# Patient Record
Sex: MALE | Race: WHITE | NOT HISPANIC OR LATINO | Employment: OTHER | URBAN - METROPOLITAN AREA
[De-identification: names, ages, dates, MRNs, and addresses within clinical notes are randomized per-mention and may not be internally consistent; named-entity substitution may affect disease eponyms.]

---

## 2020-12-04 ENCOUNTER — HOSPITAL ENCOUNTER (OUTPATIENT)
Dept: GENERAL RADIOLOGY | Facility: HOSPITAL | Age: 75
Discharge: HOME | End: 2020-12-04
Admitting: INTERNAL MEDICINE

## 2020-12-04 ENCOUNTER — TRANSCRIBE ORDERS (OUTPATIENT)
Dept: ADMINISTRATIVE | Facility: HOSPITAL | Age: 75
End: 2020-12-04

## 2020-12-04 DIAGNOSIS — M54.50 ACUTE BILATERAL LOW BACK PAIN WITHOUT SCIATICA: Primary | ICD-10-CM

## 2020-12-04 DIAGNOSIS — M54.50 ACUTE BILATERAL LOW BACK PAIN WITHOUT SCIATICA: ICD-10-CM

## 2020-12-04 PROCEDURE — 72114 X-RAY EXAM L-S SPINE BENDING: CPT

## 2021-04-16 ENCOUNTER — OFFICE VISIT (OUTPATIENT)
Dept: NEUROSURGERY | Facility: CLINIC | Age: 76
End: 2021-04-16

## 2021-04-16 VITALS — WEIGHT: 171.6 LBS | HEIGHT: 70 IN | TEMPERATURE: 96.8 F | BODY MASS INDEX: 24.57 KG/M2

## 2021-04-16 DIAGNOSIS — M51.36 DDD (DEGENERATIVE DISC DISEASE), LUMBAR: ICD-10-CM

## 2021-04-16 DIAGNOSIS — M47.819 FACET ARTHROPATHY: ICD-10-CM

## 2021-04-16 DIAGNOSIS — M48.062 SPINAL STENOSIS OF LUMBAR REGION WITH NEUROGENIC CLAUDICATION: Primary | ICD-10-CM

## 2021-04-16 DIAGNOSIS — M54.9 MECHANICAL BACK PAIN: ICD-10-CM

## 2021-04-16 PROCEDURE — 99203 OFFICE O/P NEW LOW 30 MIN: CPT | Performed by: NEUROLOGICAL SURGERY

## 2021-04-16 RX ORDER — LISINOPRIL 10 MG/1
40 TABLET ORAL DAILY
COMMUNITY
End: 2021-10-15

## 2021-04-16 RX ORDER — ATORVASTATIN CALCIUM 10 MG/1
40 TABLET, FILM COATED ORAL DAILY
COMMUNITY
End: 2021-10-15

## 2021-04-16 NOTE — PROGRESS NOTES
Patient: Chris Patel  : 1945    Primary Care Provider: Shannan Rivera MD    Requesting Provider: As above        History    Chief Complaint: Low back pain with walking and standing intolerance.    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  He denies any inciting or precipitating events.  The pain occurs with activity such as bending, twisting, working.  However symptoms become more pronounced if he is on his feet too long.  He can sit down or lie down and his symptoms improve and then he can get up and go once again.  He has no bowel or bladder dysfunction.  He has no leg symptoms including pain, weakness, sensory alteration.  He has done physical therapy and chiropractic to no avail.    Review of Systems   Constitutional: Negative.  Negative for activity change, appetite change, chills, diaphoresis, fatigue, fever and unexpected weight change.   HENT: Negative.  Negative for congestion, dental problem, drooling, ear discharge, ear pain, facial swelling, hearing loss, mouth sores, nosebleeds, postnasal drip, rhinorrhea, sinus pressure, sinus pain, sneezing, sore throat, tinnitus, trouble swallowing and voice change.    Eyes: Negative.  Negative for photophobia, pain, discharge, redness, itching and visual disturbance.   Respiratory: Negative.  Negative for apnea, cough, choking, chest tightness, shortness of breath, wheezing and stridor.    Cardiovascular: Negative.  Negative for chest pain, palpitations and leg swelling.   Gastrointestinal: Negative.  Negative for abdominal distention, abdominal pain, anal bleeding, blood in stool, constipation, diarrhea, nausea, rectal pain and vomiting.   Endocrine: Negative.  Negative for cold intolerance, heat intolerance, polydipsia, polyphagia and polyuria.   Genitourinary: Negative.  Negative for decreased urine volume, difficulty urinating,  "discharge, dysuria, enuresis, flank pain, frequency, genital sores, hematuria, penile pain, penile swelling, scrotal swelling, testicular pain and urgency.   Musculoskeletal: Positive for back pain. Negative for arthralgias, gait problem, joint swelling, myalgias, neck pain and neck stiffness.   Skin: Negative.  Negative for color change, pallor, rash and wound.   Allergic/Immunologic: Negative.  Negative for environmental allergies, food allergies and immunocompromised state.   Neurological: Negative.  Negative for dizziness, tremors, seizures, syncope, facial asymmetry, speech difficulty, weakness, light-headedness, numbness and headaches.   Hematological: Negative.  Negative for adenopathy. Does not bruise/bleed easily.   Psychiatric/Behavioral: Negative.  Negative for agitation, behavioral problems, confusion, decreased concentration, dysphoric mood, hallucinations, self-injury, sleep disturbance and suicidal ideas. The patient is not nervous/anxious and is not hyperactive.        The patient's past medical history, past surgical history, family history, and social history have been reviewed at length in the electronic medical record.    Physical Exam:   Temp 96.8 °F (36 °C)   Ht 177.8 cm (70\")   Wt 77.8 kg (171 lb 9.6 oz)   BMI 24.62 kg/m²   CONSTITUTIONAL: Patient is well-nourished, pleasant and appears stated age.  CV: Heart regular rate and rhythm without murmur, rub, or gallop.  PULMONARY: Lungs are clear to ascultation.  MUSCULOSKELETAL:  Straight leg raising is negative.  Thomas's Sign is negative.  ROM in the low back is somewhat limited in all directions.  Tenderness in the back to palpation is not observed.  NEUROLOGICAL:  Orientation, memory, attention span, language function, and cognition have been examined and are intact.  Strength is intact in the lower extremities to direct testing.  Muscle tone is normal throughout.  Station and gait are normal.  Sensation is intact to light touch testing " throughout.  Deep tendon reflexes are 1+ and symmetrical at the knees and absent at the ankles.  Coordination is intact.      Medical Decision Making    Data Review:   MRI of the lumbar spine dated 3/10/2021 demonstrates some diffuse degenerative disc disease.  There is some mild bilateral recess narrowing at multiple levels.  There is some epidural lipomatosis.    Diagnosis:   1.  Lumbar stenosis with neurogenic claudication.  2.  Mechanical low back pain.    Treatment Options:   While his MRI is not particularly remarkable his history is very consistent with neurogenic claudication.  I am going to set up a lumbar CT myelogram with upright images to see if that is an issue here.  If not then he may need to be referred for some injections.       Diagnosis Plan   1. Mechanical back pain     2. DDD (degenerative disc disease), lumbar     3. Facet arthropathy         Scribed for Kenan Arevalo MD by Lilly Diaz [facility] 4/16/2021 09:31 EDT      I, Dr. Arevalo, personally performed the services described in the documentation, as scribed in my presence, and it is both accurate and complete.

## 2021-05-18 ENCOUNTER — HOSPITAL ENCOUNTER (OUTPATIENT)
Dept: GENERAL RADIOLOGY | Facility: HOSPITAL | Age: 76
Discharge: HOME | End: 2021-05-18

## 2021-05-18 ENCOUNTER — HOSPITAL ENCOUNTER (OUTPATIENT)
Dept: CT IMAGING | Facility: HOSPITAL | Age: 76
Discharge: HOME | End: 2021-05-18

## 2021-05-18 VITALS
WEIGHT: 167 LBS | SYSTOLIC BLOOD PRESSURE: 142 MMHG | HEIGHT: 70 IN | DIASTOLIC BLOOD PRESSURE: 82 MMHG | OXYGEN SATURATION: 94 % | RESPIRATION RATE: 16 BRPM | BODY MASS INDEX: 23.91 KG/M2 | TEMPERATURE: 97.5 F | HEART RATE: 59 BPM

## 2021-05-18 DIAGNOSIS — M48.062 SPINAL STENOSIS OF LUMBAR REGION WITH NEUROGENIC CLAUDICATION: ICD-10-CM

## 2021-05-18 DIAGNOSIS — M48.062 SPINAL STENOSIS, LUMBAR REGION, WITH NEUROGENIC CLAUDICATION: ICD-10-CM

## 2021-05-18 PROCEDURE — 0 IOPAMIDOL 41 % SOLUTION: Performed by: NEUROLOGICAL SURGERY

## 2021-05-18 PROCEDURE — 72240 MYELOGRAPHY NECK SPINE: CPT

## 2021-05-18 PROCEDURE — 62304 MYELOGRAPHY LUMBAR INJECTION: CPT

## 2021-05-18 PROCEDURE — 62284 INJECTION FOR MYELOGRAM: CPT | Performed by: NEUROLOGICAL SURGERY

## 2021-05-18 PROCEDURE — 72132 CT LUMBAR SPINE W/DYE: CPT

## 2021-05-18 PROCEDURE — 72120 X-RAY BEND ONLY L-S SPINE: CPT

## 2021-05-18 RX ORDER — PROMETHAZINE HYDROCHLORIDE 25 MG/1
25 TABLET ORAL ONCE AS NEEDED
Status: DISCONTINUED | OUTPATIENT
Start: 2021-05-18 | End: 2021-05-20 | Stop reason: HOSPADM

## 2021-05-18 RX ORDER — ONDANSETRON 4 MG/1
4 TABLET, FILM COATED ORAL ONCE AS NEEDED
Status: DISCONTINUED | OUTPATIENT
Start: 2021-05-18 | End: 2021-05-20 | Stop reason: HOSPADM

## 2021-05-18 RX ORDER — LIDOCAINE HYDROCHLORIDE 10 MG/ML
5 INJECTION, SOLUTION EPIDURAL; INFILTRATION; INTRACAUDAL; PERINEURAL ONCE
Status: COMPLETED | OUTPATIENT
Start: 2021-05-18 | End: 2021-05-18

## 2021-05-18 RX ADMIN — IOPAMIDOL 20 ML: 408 INJECTION, SOLUTION INTRATHECAL at 07:14

## 2021-05-18 RX ADMIN — LIDOCAINE HYDROCHLORIDE 5 ML: 10 INJECTION, SOLUTION EPIDURAL; INFILTRATION; INTRACAUDAL; PERINEURAL at 07:12

## 2021-05-18 NOTE — POST-PROCEDURE NOTE
MYELOGRAM PROCEDURE NOTE  Neurosurgery    Patient: Chris Patel  : 1945      PreOp Diagnosis: Lumbar stenosis with neurogenic claudication    PostOp Diagnosis: Same    Procedure: Lumbar myelogram    Surgeon: Mickey    Anesthesia: 1% lidocaine    Technique:   Spinal needle: 22 gauge   Contrast: Isovue 200 (20ml)   Injection site: L L2    EBL: Trace    Specimens removed: None    Complication: None        Kenan Arevalo MD  21  07:22 EDT

## 2021-05-18 NOTE — NURSING NOTE
Pt discharged from ir dept s/p myelogram.  Pt tolerated procedure without complications.  Discharge instructions reviewed with patient and family that accompanied him today.  Pt kept indicating that he was planning on working in garage later today.  Stressed importance of laying down and drinking fluids as instructed to avoid spinal headache.  Pt was given a disc of today's test along with instructions to take with him to his appointment on Friday morning.  Pt reports he has a dermatologist appointment at 0800 that morning but should be able to make it to Dr. Arevalo's office in plenty of time. Pt transported to exit via wheelchair per CNA.

## 2021-05-19 ENCOUNTER — TELEPHONE (OUTPATIENT)
Dept: INFUSION THERAPY | Facility: HOSPITAL | Age: 76
End: 2021-05-19

## 2021-05-28 ENCOUNTER — OFFICE VISIT (OUTPATIENT)
Dept: NEUROSURGERY | Facility: CLINIC | Age: 76
End: 2021-05-28

## 2021-05-28 VITALS — TEMPERATURE: 96.9 F | WEIGHT: 166.6 LBS | HEIGHT: 70 IN | BODY MASS INDEX: 23.85 KG/M2

## 2021-05-28 DIAGNOSIS — M51.36 DDD (DEGENERATIVE DISC DISEASE), LUMBAR: ICD-10-CM

## 2021-05-28 DIAGNOSIS — M48.062 SPINAL STENOSIS OF LUMBAR REGION WITH NEUROGENIC CLAUDICATION: Primary | ICD-10-CM

## 2021-05-28 PROCEDURE — 99213 OFFICE O/P EST LOW 20 MIN: CPT | Performed by: NEUROLOGICAL SURGERY

## 2021-05-28 NOTE — PROGRESS NOTES
Patient: Chris Patel  : 1945    Primary Care Provider: Shannan Rivera MD    Requesting Provider: As above        History    Chief Complaint: Low back pain with walking and standing intolerance.    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  He denies any inciting or precipitating events.  The pain occurs with activity such as bending, twisting, working.  However symptoms become more pronounced if he is on his feet too long.  He can sit down or lie down and his symptoms improve and then he can get up and go once again.  He has no bowel or bladder dysfunction.  He has no leg symptoms including pain, weakness, sensory alteration.  He has done physical therapy and chiropractic to no avail.  His symptoms are unchanged.       Review of Systems   Constitutional: Negative for activity change, appetite change, chills, diaphoresis, fatigue, fever and unexpected weight change.   HENT: Negative for congestion, dental problem, drooling, ear discharge, ear pain, facial swelling, hearing loss, mouth sores, nosebleeds, postnasal drip, rhinorrhea, sinus pressure, sinus pain, sneezing, sore throat, tinnitus, trouble swallowing and voice change.    Eyes: Negative for photophobia, pain, discharge, redness, itching and visual disturbance.   Respiratory: Negative for apnea, cough, choking, chest tightness, shortness of breath, wheezing and stridor.    Cardiovascular: Negative for chest pain, palpitations and leg swelling.   Gastrointestinal: Negative for abdominal distention, abdominal pain, anal bleeding, blood in stool, constipation, diarrhea, nausea, rectal pain and vomiting.   Endocrine: Negative for cold intolerance, heat intolerance, polydipsia, polyphagia and polyuria.   Genitourinary: Negative for decreased urine volume, difficulty urinating, discharge, dysuria, enuresis, flank pain, frequency, genital  "sores, hematuria, penile pain, penile swelling, scrotal swelling, testicular pain and urgency.   Musculoskeletal: Positive for back pain. Negative for arthralgias, gait problem, joint swelling, myalgias, neck pain and neck stiffness.   Skin: Negative for color change, pallor, rash and wound.   Allergic/Immunologic: Negative for environmental allergies, food allergies and immunocompromised state.   Neurological: Negative for dizziness, tremors, seizures, syncope, facial asymmetry, speech difficulty, weakness, light-headedness, numbness and headaches.   Hematological: Negative for adenopathy. Does not bruise/bleed easily.   Psychiatric/Behavioral: Negative for agitation, behavioral problems, confusion, decreased concentration, dysphoric mood, hallucinations, self-injury, sleep disturbance and suicidal ideas. The patient is not nervous/anxious and is not hyperactive.        The patient's past medical history, past surgical history, family history, and social history have been reviewed at length in the electronic medical record.    Physical Exam:   Temp 96.9 °F (36.1 °C)   Ht 177.8 cm (70\")   Wt 75.6 kg (166 lb 9.6 oz)   BMI 23.90 kg/m²   MUSCULOSKELETAL:  Straight leg raising is negative.  Thomas's Sign is negative.  ROM in the low back is normal.  Tenderness in the back to palpation is not observed.  NEUROLOGICAL:  Strength is intact in the lower extremities to direct testing.  Muscle tone is normal throughout.  Station and gait are normal.  Sensation is intact to light touch testing throughout.    Medical Decision Making    Data Review:   (All imaging studies were personally reviewed unless stated otherwise)  CT myelogram demonstrates at least moderate stenosis at L3-4 and mild to moderate stenosis at L4-5.  No evidence of instability.    Diagnosis:   1.  Lumbar stenosis with neurogenic claudication.  2.  Mechanical low back pain.    Treatment Options:   The patient symptoms are very compelling for spinal " stenosis and neurogenic claudication.  The study findings are not as impressive.  I am going to refer him for some injections in his back.  He will follow-up thereafter.  If he is not improved then we will need to consider lumbar decompression at L3-4 and L4-5.       Diagnosis Plan   1. Spinal stenosis of lumbar region with neurogenic claudication  Ambulatory Referral to Pain Management   2. DDD (degenerative disc disease), lumbar         Scribed for Kenan Arevalo MD by Lilly Diaz, [facility] 5/28/2021 09:49 EDT      I, Dr. Arevalo, personally performed the services described in the documentation, as scribed in my presence, and it is both accurate and complete.

## 2021-07-01 NOTE — PROGRESS NOTES
"Chief Complaint: \"Lower back pain\"         History of Present Illness:   Patient: Mr. Chris Patel, 75 y.o. male   Referring Physician: Dr. Kenan Arevalo  Reason for Referral: Consultation for chronic intractable lower back pain.   Pain History:  Patient reports a 2-year history of progressive lower back pain, which began without incident.  Patient has failed to obtain pain relief with conservative measures including oral analgesics, physical therapy, chiropractic therapy, to name a few. CT myelogram revealed moderate stenosis at L3-L4 and mild to moderate stenosis at L4-L5.  Flexion and extension x-rays demonstrated no instability. Chris Patel underwent neurosurgical consultation with Dr. Kenan Arevalo on 05/28/2021, and was found to be a potential surgical candidate for lumbar decompression at L3-L4 and L4-L5. Pain has progressed in intensity over the past months.   Pain Description: constant pain with intermittent exacerbation, described as aching, burning and throbbing sensation.   Radiation of Pain: The pain does not radiate.  Pain intensity today: 5/10  Average pain intensity last week: 5/10  Pain intensity ranges from: 0/10 to 5/10  Aggravating factors: Pain increases with standing, walking. Patient describes neurogenic claudication.    Alleviating factors: Pain decreases with sitting down, lying down   Associated Symptoms:   Patient denies pain, numbness, or weakness in the lower extremities.   Patient denies any new bladder or bowel problems.   Patient denies difficulties with his balance or recent falls.     Review of previous therapies and additional medical records:  Chris Patel has already failed the following measures, including:   Conservative Measures: Oral analgesics, topical analgesics, ice, heat, chiropractic therapy, massage, physical therapy   Interventional Measures: None  Surgical Measures: No history of previous lumbar spine or hip surgery   Chris Patel underwent neurosurgical " consultation with Dr. eKnan Arevalo on 05/28/2021, and was found to be a potential surgical candidate for lumbar decompression at L3-L4 and L4-L5.  Chris Patel presents with significant comorbidities including psoriasis, hypertension, hyperlipidemia  In terms of current analgesics, Chris Patel takes: gabapentin  I have reviewed [facility] Report #[id number] (gabapentin) consistent with medication reconciliation.  SOAPP: Low Risk     Global Pain Scale 07-06  2021          Pain 13          Feelings 0          Clinical outcomes 2          Activities 0          GPS Total: 15            Review of Diagnostic Studies:    CT myelogram of the lumbar spine on 5/19/2021. I have reviewed the images with the patient as well as the radiology report.  Vertebral body height and alignment are maintained.  The conus medullaris and cauda equina appear unremarkable.  Diffuse lumbar spondylosis.  Axial imaging:  L1-L2: Disc bulge, facet hypertrophy.  No significant canal or foraminal stenosis  L2-L3: Disc bulge, facet hypertrophy.  Mild canal and mild bilateral foraminal stenosis  L3-L4: Disc bulge, facet hypertrophy, ligamentum flavum hypertrophy contributing to mild canal stenosis and mild bilateral foraminal stenosis  L4-L5: Disc bulge, facet hypertrophy, ligamentum flavum hypertrophy.  Mild canal and mild bilateral foraminal stenosis  L5-S1: Disc bulge, facet hypertrophy.  No significant canal or foraminal stenosis  Flexion and extension x-rays 5/19/2021.  I have reviewed images and radiology report.  There is good filling of the thecal sac.  There is effacement of the nerve root sleeves bilaterally at L3-L4.  MRI of the lumbar spine without contrast 3/10/2021.  I have reviewed the images and the radiology report.  Diffuse lumbar spondylosis.  Slight lumbar levoscoliosis.  Axial imaging:  T11-T12, T12-L1, L1-L2: No significant canal or foraminal stenosis  L2-L3: Annular fissure.  Schmorl's node.  Facet hypertrophy.  Mild canal  "stenosis.  No significant foraminal stenosis  L3-L4: Disc bulge, facet hypertrophy.  Mild canal and mild foraminal stenosis  L4-L5: Disc bulge, facet hypertrophy, mild Modic 1 endplate changes.  Mild canal and foraminal stenosis  L5-S1: Disc bulge, facet hypertrophy.  No significant canal stenosis.  Mild bilateral foraminal stenosis    Review of Systems   Musculoskeletal: Positive for back pain.   All other systems reviewed and are negative.        Patient Active Problem List   Diagnosis   • Lumbar stenosis with neurogenic claudication   • Spondylosis of lumbar region without myelopathy or radiculopathy   • Degeneration of lumbar or lumbosacral intervertebral disc   • Physical deconditioning   • Gait disturbance       Past Medical History:   Diagnosis Date   • Arthritis    • Back pain    • Chronic kidney disease     \"weak kidneys\"   • High cholesterol    • Hypertension    • Psoriasis          Past Surgical History:   Procedure Laterality Date   • HERNIA REPAIR           Family History   Family history unknown: Yes         Social History     Socioeconomic History   • Marital status:      Spouse name: Not on file   • Number of children: Not on file   • Years of education: Not on file   • Highest education level: Not on file   Tobacco Use   • Smoking status: Former Smoker     Packs/day: 1.00     Quit date:      Years since quittin.5   • Smokeless tobacco: Never Used   • Tobacco comment: 1 year   Vaping Use   • Vaping Use: Never assessed   Substance and Sexual Activity   • Alcohol use: Yes     Alcohol/week: 3.0 standard drinks     Types: 3 Shots of liquor per week     Comment: bourbon   • Drug use: Never   • Sexual activity: Defer           Current Outpatient Medications:   •  atorvastatin (LIPITOR) 40 MG tablet, Take 40 mg by mouth every night at bedtime., Disp: , Rfl:   •  gabapentin (NEURONTIN) 100 MG capsule, Take 100 mg by mouth 3 (Three) Times a Day., Disp: , Rfl:   •  lisinopril (PRINIVIL,ZESTRIL) " "40 MG tablet, Take 40 mg by mouth Daily., Disp: , Rfl:   •  Risankizumab-rzaa (SKYRIZI, 150 MG DOSE, SC), Inject 75 mg under the skin into the appropriate area as directed Every 30 (Thirty) Days., Disp: , Rfl:   •  atorvastatin (LIPITOR) 10 MG tablet, Take 40 mg by mouth Daily. Unknown , Disp: , Rfl:   •  lisinopril (PRINIVIL,ZESTRIL) 10 MG tablet, Take 40 mg by mouth Daily. Un known , Disp: , Rfl:       Allergies   Allergen Reactions   • Penicillins Hives         /78 (BP Location: Right arm, Patient Position: Sitting, Cuff Size: Adult)   Pulse 95   Temp 96.9 °F (36.1 °C)   Ht 177.8 cm (70\")   Wt 74 kg (163 lb 3.2 oz)   SpO2 95%   BMI 23.42 kg/m²       Physical Exam:  Constitutional: Patient appears well-developed, well-nourished, well-hydrated, appears younger than stated age  HEENT: Head: Normocephalic and atraumatic  Eyes: Conjunctivae and lids are normal  Pupils: Equal, round, reactive to light  Neck: Trachea normal. Neck supple. No JVD present.   Pulmonary: No increased work of breathing or signs of respiratory distress. Auscultation of lungs: Clear to auscultation.   Cardiovascular: Normal rate and rhythm, normal S1 and S2, no murmurs.   Peripheral vascular exam: Femoral: right 2+, left 2+. Posterior tibialis: right 2+ and left 2+. Dorsalis pedis: right 2+ and left 2+. No edema.   Musculoskeletal   Gait and station: Gait evaluation demonstrated some shuffling. Able to walk on heels, toes with some difficulties  Lumbar spine: Passive and active range of motion are limited although without pain. Extension, flexion, lateral flexion, rotation of the lumbar spine did not increase or reproduce pain. Lumbar facet joint loading maneuvers are negative  Thomas test and Gaenslen's test are negative   Piriformis maneuvers are negative   Palpation of the bilateral greater trochanter, unrevealing   Examination of the Iliotibial band: unrevealing   Hip joints: The range of motion of the hip joints is almost full " and without pain   Neurological:   Patient is alert and oriented to person, place, and time.   Speech: Normal.   Cortical function: Normal mental status.   Cranial nerves 2-12: intact.   Reflex Scores:  Right patellar: 2+  Left patellar: 2+  Right Achilles: 1+  Left Achilles: 1+  Motor strength: 5/5  Motor Tone: Normal .   Involuntary movements: None.   Superficial/Primitive Reflexes: Primitive reflexes were absent.   Right La: Absent  Left La: Absent  Right ankle clonus: Absent  Left ankle clonus: Absent   Babinsky: Absent  Long tract signs: Negative. Straight leg raising test: Negative. Femoral stretch sign: Negative.   Sensory exam: Intact to light touch, intact pain and temperature sensation, intact vibration sensation and normal proprioception.   Coordination: Normal finger to nose and heel to shin. Normal balance and negative Romberg's sign   Skin and subcutaneous tissue: Skin is warm and intact. No rash noted. No cyanosis.   Psychiatric: Judgment and insight: Normal. Recent and remote memory: Intact. Mood and affect: Normal.     ASSESSMENT:   1. Lumbar stenosis with neurogenic claudication    2. Spondylosis of lumbar region without myelopathy or radiculopathy    3. Degeneration of lumbar or lumbosacral intervertebral disc    4. Gait disturbance    5. Physical deconditioning          PLAN/MEDICAL DECISION MAKING:  Patient reports a 2-year history of progressive lower back pain associated with severe neurogenic claudication, which began without incident.  Patient has failed to obtain pain relief with conservative measures including oral analgesics, physical therapy, chiropractic therapy, to name a few. CT myelogram revealed moderate stenosis at L3-L4 and mild to moderate stenosis at L4-L5.  Flexion and extension x-rays demonstrated no instability. Chris Patel underwent neurosurgical consultation with Dr. Kenan Arevalo on 05/28/2021, and was found to be a potential surgical candidate for lumbar  decompression at L3-L4 and L4-L5. Pain has progressed in intensity over the past months. Patient has failed to obtain pain relief with conservative measures, as referenced above. I have reviewed all available patient's medical records as well as previous therapies as referenced above. I had a lengthy conversation with Mr. Chris Patel regarding his chronic pain condition and potential therapeutic options including risks, benefits, alternative therapies, to name a few. Therefore, I have proposed the following plan:  1. Pharmacological measures: Reviewed and discussed;   A. Patient takes gabapentin  B. Trial with Rheumate one tablet twice daily  C. Start pyridoxine 100 mg one tablet by mouth daily, take for 30 days  2. Interventional pain management measures: Patient will be scheduled for diagnostic and therapeutic bilateral L3-L4 transforaminal epidural steroid injections. We may repeat epidurals depending on patient's outcome.  Patient will follow-up with Dr. Arevalo thereafter for possible lumbar decompression at L3-L4 and L4-L5.  If surgery is not an option would include the possibility of a mild procedure or eventually a spinal cord stimulator trial.  3. Long-term rehabilitation efforts:  A. The patient does not have a history of falls. I did complete a risk assessment for falls  B. Patient will start a comprehensive physical therapy program at [facility] with Dr. Flip Carrion for core strengthening, gait and balance training, neurodynamics, cupping, dry needling and Alter-G   C. Start an exercise program such as water therapy  D. Contrast therapy: Apply ice-packs for 15-20 minutes, followed by heating pads for 15-20 minutes to affected area   4. The patient has been instructed to contact my office with any questions or difficulties. The patient understands the plan and agrees to proceed accordingly.      Patient Care Team:  Shannan Rivera MD as PCP - General (Internal Medicine & Pediatrics)  Shannan Rivera MD as  Referring Physician (Internal Medicine & Pediatrics)     No orders of the defined types were placed in this encounter.        No future appointments.      Akin Rodriguez MD     EMR Dragon/Transcription disclaimer:  Much of this encounter note is an electronic transcription of spoken language to printed text. Electronic transcription of spoken language may permit erroneous, or at times, nonsensical words or phrases to be inadvertently transcribed. Although I have reviewed the note for such errors, some may still exist.

## 2021-07-02 PROBLEM — M51.37 DEGENERATION OF LUMBAR OR LUMBOSACRAL INTERVERTEBRAL DISC: Status: ACTIVE | Noted: 2021-07-02

## 2021-07-02 PROBLEM — M47.816 SPONDYLOSIS OF LUMBAR REGION WITHOUT MYELOPATHY OR RADICULOPATHY: Status: ACTIVE | Noted: 2021-07-02

## 2021-07-02 PROBLEM — M48.062 LUMBAR STENOSIS WITH NEUROGENIC CLAUDICATION: Status: ACTIVE | Noted: 2021-07-02

## 2021-07-06 ENCOUNTER — OFFICE VISIT (OUTPATIENT)
Dept: PAIN MEDICINE | Facility: CLINIC | Age: 76
End: 2021-07-06

## 2021-07-06 VITALS
DIASTOLIC BLOOD PRESSURE: 78 MMHG | HEART RATE: 95 BPM | BODY MASS INDEX: 23.37 KG/M2 | WEIGHT: 163.2 LBS | TEMPERATURE: 96.9 F | OXYGEN SATURATION: 95 % | HEIGHT: 70 IN | SYSTOLIC BLOOD PRESSURE: 130 MMHG

## 2021-07-06 DIAGNOSIS — M47.816 SPONDYLOSIS OF LUMBAR REGION WITHOUT MYELOPATHY OR RADICULOPATHY: ICD-10-CM

## 2021-07-06 DIAGNOSIS — R53.81 PHYSICAL DECONDITIONING: ICD-10-CM

## 2021-07-06 DIAGNOSIS — M51.37 DEGENERATION OF LUMBAR OR LUMBOSACRAL INTERVERTEBRAL DISC: ICD-10-CM

## 2021-07-06 DIAGNOSIS — R26.9 GAIT DISTURBANCE: ICD-10-CM

## 2021-07-06 DIAGNOSIS — M48.062 LUMBAR STENOSIS WITH NEUROGENIC CLAUDICATION: Primary | ICD-10-CM

## 2021-07-06 DIAGNOSIS — M48.062 LUMBAR STENOSIS WITH NEUROGENIC CLAUDICATION: ICD-10-CM

## 2021-07-06 PROCEDURE — 99203 OFFICE O/P NEW LOW 30 MIN: CPT | Performed by: ANESTHESIOLOGY

## 2021-07-06 RX ORDER — ATORVASTATIN CALCIUM 40 MG/1
40 TABLET, FILM COATED ORAL
COMMUNITY
Start: 2021-06-07

## 2021-07-06 RX ORDER — GABAPENTIN 100 MG/1
100 CAPSULE ORAL 3 TIMES DAILY
COMMUNITY
Start: 2021-06-07 | End: 2021-10-15

## 2021-07-06 RX ORDER — LISINOPRIL 40 MG/1
40 TABLET ORAL DAILY
COMMUNITY
Start: 2021-06-07

## 2021-07-06 RX ORDER — MULTIVITAMIN WITH IRON
100 TABLET ORAL DAILY
Qty: 30 TABLET | Refills: 0 | Status: SHIPPED | OUTPATIENT
Start: 2021-07-06 | End: 2021-09-08

## 2021-07-06 RX ORDER — ME-TETRAHYDROFOLATE/B12/HRB236 1-1-500 MG
1 CAPSULE ORAL DAILY
Qty: 90 CAPSULE | Refills: 1 | Status: SHIPPED | OUTPATIENT
Start: 2021-07-06 | End: 2021-09-08

## 2021-07-19 ENCOUNTER — OUTSIDE FACILITY SERVICE (OUTPATIENT)
Dept: PAIN MEDICINE | Facility: CLINIC | Age: 76
End: 2021-07-19

## 2021-07-19 PROCEDURE — 64483 NJX AA&/STRD TFRM EPI L/S 1: CPT | Performed by: ANESTHESIOLOGY

## 2021-07-19 PROCEDURE — 99152 MOD SED SAME PHYS/QHP 5/>YRS: CPT | Performed by: ANESTHESIOLOGY

## 2021-07-20 ENCOUNTER — TELEPHONE (OUTPATIENT)
Dept: PAIN MEDICINE | Facility: CLINIC | Age: 76
End: 2021-07-20

## 2021-07-20 NOTE — TELEPHONE ENCOUNTER
LVM for pt regarding how they’re feeling after yesterday’s procedure with Dr. Rodriguez. Advised of f/u and pt to contact us if needed.

## 2021-08-24 NOTE — PROGRESS NOTES
"Chief Complaint: \"Lower back pain.\"         History of Present Illness:   Patient: Mr. Chris Patel, 75 y.o. male originally referred by Dr. Kenan Arevalo in consultation for chronic intractable lower back pain. Patient reports a 2-year history of lower back pain, which began without incident.  He was last seen on July 19, 2021, when he underwent diagnostic and therapeutic bilateral L3-L4 transforaminal epidural steroid injection, from which he reports experiencing no significant pain relief or reduction in his pain levels.  He did not begin physical therapy.  He returns today for post procedure follow-up and evaluation.  Pain Description: intermittent exacerbation (constant with ambulation), described as aching, burning and throbbing sensation.   Radiation of Pain: The pain does not radiate.  Pain intensity today: 5/10  Average pain intensity last week: 5/10  Pain intensity ranges from: 0/10 to 7/10  Aggravating factors: Pain increases with standing, walking. Patient describes neurogenic claudication.    Alleviating factors: Pain decreases with sitting down, lying down   Associated Symptoms:   Patient denies pain, numbness, or weakness in the lower extremities.   Patient denies any new bladder or bowel problems.   Patient denies difficulties with his balance or recent falls.     Review of previous therapies and additional medical records:  Chris Patel has already failed the following measures, including:   Conservative Measures: Oral analgesics, topical analgesics, ice, heat, chiropractic therapy, massage, physical therapy   Interventional Measures: 07/19/2021: DxTx bilateral L3-L4 transforaminal epidural steroid injection  Surgical Measures: No history of previous lumbar spine or hip surgery   Chris Patel underwent neurosurgical consultation with Dr. Kenan Arevalo on 05/28/2021, and was found to be a potential surgical candidate for lumbar decompression at L3-L4 and L4-L5.  Chris Ptael presents with " significant comorbidities including psoriasis, hypertension, hyperlipidemia  In terms of current analgesics, Chris Patel takes: OTC  I have reviewed Gabriel Report #[id number] consistent with medication reconciliation.  SOAPP: Low Risk     Global Pain Scale 07-06  2021 08-25 2021         Pain 13 14         Feelings 0 0         Clinical outcomes 2 3         Activities 0 5         GPS Total: 15 22           Review of Diagnostic Studies:    CT myelogram of the lumbar spine on 5/19/2021.  Images were reviewed.  Vertebral body height and alignment are maintained.  The conus medullaris and cauda equina appear unremarkable.  Diffuse lumbar spondylosis.    L1-L2: Disc bulge, facet hypertrophy.  No significant canal or foraminal stenosis  L2-L3: Disc bulge, facet hypertrophy.  Mild canal and mild bilateral foraminal stenosis  L3-L4: Disc bulge, facet hypertrophy, ligamentum flavum hypertrophy contributing to mild canal stenosis and mild bilateral foraminal stenosis  L4-L5: Disc bulge, facet hypertrophy, ligamentum flavum hypertrophy.  Mild canal and mild bilateral foraminal stenosis  L5-S1: Disc bulge, facet hypertrophy.  No significant canal or foraminal stenosis  Flexion and extension x-rays 5/19/2021.  There is good filling of the thecal sac.  There is effacement of the nerve root sleeves bilaterally at L3-L4.  MRI of the lumbar spine without contrast 3/10/2021.  Images were reviewed.  Diffuse lumbar spondylosis.  Slight lumbar levoscoliosis.   T11-T12, T12-L1, L1-L2: No significant canal or foraminal stenosis  L2-L3: Annular fissure.  Schmorl's node.  Facet hypertrophy.  Mild canal stenosis.  No significant foraminal stenosis  L3-L4: Disc bulge, facet hypertrophy.  Mild canal and mild foraminal stenosis  L4-L5: Disc bulge, facet hypertrophy, mild Modic 1 endplate changes.  Mild canal and foraminal stenosis  L5-S1: Disc bulge, facet hypertrophy.  No significant canal stenosis.  Mild bilateral foraminal stenosis    Review  "of Systems   Musculoskeletal: Positive for back pain.   All other systems reviewed and are negative.        Patient Active Problem List   Diagnosis   • Lumbar stenosis with neurogenic claudication   • Spondylosis of lumbar region without myelopathy or radiculopathy   • Degeneration of lumbar or lumbosacral intervertebral disc   • Physical deconditioning   • Gait disturbance       Past Medical History:   Diagnosis Date   • Arthritis    • Back pain    • Chronic kidney disease     \"weak kidneys\"   • High cholesterol    • Hypertension    • Psoriasis          Past Surgical History:   Procedure Laterality Date   • HERNIA REPAIR           Family History   Family history unknown: Yes         Social History     Socioeconomic History   • Marital status:      Spouse name: Not on file   • Number of children: Not on file   • Years of education: Not on file   • Highest education level: Not on file   Tobacco Use   • Smoking status: Former Smoker     Packs/day: 1.00     Quit date:      Years since quittin.6   • Smokeless tobacco: Never Used   • Tobacco comment: 1 year   Vaping Use   • Vaping Use: Never assessed   Substance and Sexual Activity   • Alcohol use: Yes     Alcohol/week: 3.0 standard drinks     Types: 3 Shots of liquor per week     Comment: bourbon   • Drug use: Never   • Sexual activity: Defer           Current Outpatient Medications:   •  atorvastatin (LIPITOR) 40 MG tablet, Take 40 mg by mouth every night at bedtime., Disp: , Rfl:   •  lisinopril (PRINIVIL,ZESTRIL) 40 MG tablet, Take 40 mg by mouth Daily., Disp: , Rfl:   •  Risankizumab-rzaa (SKYRIZI, 150 MG DOSE, SC), Inject 75 mg under the skin into the appropriate area as directed Every 30 (Thirty) Days., Disp: , Rfl:   •  atorvastatin (LIPITOR) 10 MG tablet, Take 40 mg by mouth Daily. Unknown , Disp: , Rfl:   •  Dietary Management Product (Rheumate) capsule, Take 1 capsule by mouth Daily., Disp: 90 capsule, Rfl: 1  •  gabapentin (NEURONTIN) 100 MG " "capsule, Take 100 mg by mouth 3 (Three) Times a Day., Disp: , Rfl:   •  lisinopril (PRINIVIL,ZESTRIL) 10 MG tablet, Take 40 mg by mouth Daily. Un known , Disp: , Rfl:   •  vitamin B-6 (PYRIDOXINE) 100 MG tablet, Take 1 tablet by mouth Daily., Disp: 30 tablet, Rfl: 0      Allergies   Allergen Reactions   • Penicillins Hives         /89 (BP Location: Left arm, Patient Position: Sitting, Cuff Size: Adult)   Pulse 74   Temp 96.6 °F (35.9 °C)   Ht 177.8 cm (70\")   Wt 73.5 kg (162 lb)   SpO2 96%   BMI 23.24 kg/m²       Physical Exam:  Constitutional: Patient appears well-developed, well-nourished, well-hydrated, appears younger than stated age  HEENT: Head: Normocephalic and atraumatic  Eyes: Conjunctivae and lids are normal  Pupils: Equal, round, reactive to light  Neck: Trachea normal. Neck supple. No JVD present.   Pulmonary: No increased work of breathing or signs of respiratory distress. Auscultation of lungs: Clear to auscultation.   Cardiovascular: Normal rate and rhythm, normal S1 and S2, no murmurs.   Peripheral vascular exam: Femoral: right 2+, left 2+. Posterior tibialis: right 2+ and left 2+. Dorsalis pedis: right 2+ and left 2+. No edema.   Musculoskeletal   Gait and station: Gait evaluation demonstrated some shuffling.   Lumbar spine: Passive and active range of motion are limited without pain. Extension, flexion, lateral flexion, rotation of the lumbar spine did not increase or reproduce pain. Lumbar facet joint loading maneuvers are negative  Thomas test and Gaenslen's test are negative   Piriformis maneuvers are negative   Palpation of the bilateral greater trochanter, unrevealing   Examination of the Iliotibial band: unrevealing   Hip joints: The range of motion of the hip joints is almost full and without pain   Neurological:   Patient is alert and oriented to person, place, and time.   Speech: Normal.   Cortical function: Normal mental status.   Cranial nerves 2-12: intact.   Reflex " Scores:  Right patellar: 2+  Left patellar: 2+  Right Achilles: 1+  Left Achilles: 1+  Motor strength: 5/5  Motor Tone: Normal .   Involuntary movements: None.   Superficial/Primitive Reflexes: Primitive reflexes were absent.   Right La: Absent  Left La: Absent  Right ankle clonus: Absent  Left ankle clonus: Absent   Babinsky: Absent  Long tract signs: Negative. Straight leg raising test: Negative. Femoral stretch sign: Negative.   Sensory exam: Intact to light touch, intact pain and temperature sensation, intact vibration sensation and normal proprioception.   Coordination: Normal finger to nose and heel to shin. Normal balance and negative Romberg's sign   Skin and subcutaneous tissue: Skin is warm and intact. No rash noted. No cyanosis.   Psychiatric: Judgment and insight: Normal. Recent and remote memory: Intact. Mood and affect: Normal.     I have reviewed the physical exam dated 07/06/2021. Upon examination of the patient today, there are no changes noted.      ASSESSMENT:   1. Lumbar stenosis with neurogenic claudication    2. Degeneration of lumbar or lumbosacral intervertebral disc    3. Spondylosis of lumbar region without myelopathy or radiculopathy    4. Gait disturbance    5. Physical deconditioning          PLAN/MEDICAL DECISION MAKING:  Patient reports a 2-year history of  lower back pain associated with severe neurogenic claudication.  Patient has failed to obtain pain relief with conservative measures including oral analgesics, physical therapy, chiropractic therapy, to name a few. CT myelogram revealed moderate stenosis at L3-L4 and mild to moderate stenosis at L4-L5.  Flexion and extension x-rays demonstrated no instability. Chris Patel underwent neurosurgical consultation with Dr. Kenan Arevalo on 05/28/2021, and was found to be a potential surgical candidate for lumbar decompression at L3-L4 and L4-L5.  Patient has failed to obtain pain relief with conservative measures, as  referenced above. I have reviewed all available patient's medical records as well as previous therapies as referenced above. We will proceed with repeat epidural to determine if this will provide him with more of benefit, and we have also discussed possibly revisitin a consult with Dr. Arevalo. I had a lengthy conversation with Mr. Chris Patel regarding his chronic pain condition and potential therapeutic options including risks, benefits, alternative therapies, to name a few. Therefore, I have proposed the following plan:  1. Pharmacological measures: Reviewed and discussed;   A. Patient takes OTC  2. Interventional pain management measures: Patient will be scheduled for bilateral L3-L4 transforaminal epidural steroid injections. We may repeat epidurals depending on patient's outcome.  Patient will follow-up with Dr. Arevalo thereafter for possible lumbar decompression at L3-L4 and L4-L5.  If surgery is not an option would include the possibility of a mild procedure or eventually a spinal cord stimulator trial.  3. Long-term rehabilitation efforts:  A. The patient does not have a history of falls. I did complete a risk assessment for falls  B. Patient will start a comprehensive physical therapy program for core strengthening, gait and balance training, neurodynamics, cupping, dry needling and Alter-G   C. Start an exercise program such as water therapy  D. Contrast therapy: Apply ice-packs for 15-20 minutes, followed by heating pads for 15-20 minutes to affected area   4. The patient has been instructed to contact my office with any questions or difficulties. The patient understands the plan and agrees to proceed accordingly.      Patient Care Team:  Shannan Rivera MD as PCP - General (Internal Medicine & Pediatrics)  Shannan Rivera MD as Referring Physician (Internal Medicine & Pediatrics)     No orders of the defined types were placed in this encounter.        No future appointments.      MARSHALL Doonvan      EMR Dragon/Transcription disclaimer:  Much of this encounter note is an electronic transcription of spoken language to printed text. Electronic transcription of spoken language may permit erroneous, or at times, nonsensical words or phrases to be inadvertently transcribed. Although I have reviewed the note for such errors, some may still exist.

## 2021-08-25 ENCOUNTER — OFFICE VISIT (OUTPATIENT)
Dept: PAIN MEDICINE | Facility: CLINIC | Age: 76
End: 2021-08-25

## 2021-08-25 VITALS
DIASTOLIC BLOOD PRESSURE: 89 MMHG | WEIGHT: 162 LBS | HEIGHT: 70 IN | BODY MASS INDEX: 23.19 KG/M2 | TEMPERATURE: 96.6 F | SYSTOLIC BLOOD PRESSURE: 153 MMHG | HEART RATE: 74 BPM | OXYGEN SATURATION: 96 %

## 2021-08-25 DIAGNOSIS — M47.816 SPONDYLOSIS OF LUMBAR REGION WITHOUT MYELOPATHY OR RADICULOPATHY: ICD-10-CM

## 2021-08-25 DIAGNOSIS — R53.81 PHYSICAL DECONDITIONING: ICD-10-CM

## 2021-08-25 DIAGNOSIS — M48.062 LUMBAR STENOSIS WITH NEUROGENIC CLAUDICATION: ICD-10-CM

## 2021-08-25 DIAGNOSIS — M48.062 LUMBAR STENOSIS WITH NEUROGENIC CLAUDICATION: Primary | ICD-10-CM

## 2021-08-25 DIAGNOSIS — R26.9 GAIT DISTURBANCE: ICD-10-CM

## 2021-08-25 DIAGNOSIS — M51.37 DEGENERATION OF LUMBAR OR LUMBOSACRAL INTERVERTEBRAL DISC: ICD-10-CM

## 2021-08-25 PROCEDURE — 99213 OFFICE O/P EST LOW 20 MIN: CPT | Performed by: NURSE PRACTITIONER

## 2021-08-30 ENCOUNTER — OUTSIDE FACILITY SERVICE (OUTPATIENT)
Dept: PAIN MEDICINE | Facility: CLINIC | Age: 76
End: 2021-08-30

## 2021-08-30 PROCEDURE — 64483 NJX AA&/STRD TFRM EPI L/S 1: CPT | Performed by: ANESTHESIOLOGY

## 2021-08-30 PROCEDURE — 99152 MOD SED SAME PHYS/QHP 5/>YRS: CPT | Performed by: ANESTHESIOLOGY

## 2021-08-31 ENCOUNTER — TELEPHONE (OUTPATIENT)
Dept: PAIN MEDICINE | Facility: CLINIC | Age: 76
End: 2021-08-31

## 2021-08-31 NOTE — TELEPHONE ENCOUNTER
KAREEMM for pt advised that there is follow up in our office but that pt needs to call Dr. Hernandez's office to follow up.

## 2021-09-08 ENCOUNTER — OFFICE VISIT (OUTPATIENT)
Dept: NEUROSURGERY | Facility: CLINIC | Age: 76
End: 2021-09-08

## 2021-09-08 ENCOUNTER — PREP FOR SURGERY (OUTPATIENT)
Dept: OTHER | Facility: HOSPITAL | Age: 76
End: 2021-09-08

## 2021-09-08 VITALS — WEIGHT: 161.4 LBS | BODY MASS INDEX: 23.11 KG/M2 | TEMPERATURE: 97.5 F | HEIGHT: 70 IN

## 2021-09-08 DIAGNOSIS — M48.062 LUMBAR STENOSIS WITH NEUROGENIC CLAUDICATION: Primary | ICD-10-CM

## 2021-09-08 DIAGNOSIS — M48.062 SPINAL STENOSIS OF LUMBAR REGION WITH NEUROGENIC CLAUDICATION: Primary | ICD-10-CM

## 2021-09-08 DIAGNOSIS — M51.36 DDD (DEGENERATIVE DISC DISEASE), LUMBAR: ICD-10-CM

## 2021-09-08 PROCEDURE — 99213 OFFICE O/P EST LOW 20 MIN: CPT | Performed by: NEUROLOGICAL SURGERY

## 2021-09-08 RX ORDER — VANCOMYCIN HYDROCHLORIDE 1 G/200ML
15 INJECTION, SOLUTION INTRAVENOUS ONCE
Status: CANCELLED | OUTPATIENT
Start: 2021-09-08 | End: 2021-09-08

## 2021-09-08 RX ORDER — FAMOTIDINE 20 MG/1
20 TABLET, FILM COATED ORAL
Status: CANCELLED | OUTPATIENT
Start: 2021-09-08

## 2021-09-08 NOTE — H&P
Patient: Chris Patel  : 1945     Primary Care Provider: Shannan Rivera MD     Requesting Provider: As above           History     Chief Complaint: Low back pain with walking and standing intolerance.     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  He is now doing lawn maintenance such as weed eating.  He denies any inciting or precipitating events.  The pain occurs with activity such as bending, twisting, working.  However symptoms become more pronounced if he is on his feet too long.  He can sit down or lie down and his symptoms improve and then he can get up and go once again.  He has no bowel or bladder dysfunction.  He has no leg symptoms including pain, weakness, sensory alteration.  He has done physical therapy and chiropractic to no avail.  His symptoms are unchanged.  He has had injections but that has not helped very much.  When he is on his feet too long his back bothers him substantially.  He is able to sit down for 10 minutes and then get up and go again.     Review of Systems   Constitutional: Negative for activity change, appetite change, chills, diaphoresis, fatigue, fever and unexpected weight change.   HENT: Negative for congestion, dental problem, drooling, ear discharge, ear pain, facial swelling, hearing loss, mouth sores, nosebleeds, postnasal drip, rhinorrhea, sinus pressure, sinus pain, sneezing, sore throat, tinnitus, trouble swallowing and voice change.    Eyes: Negative for photophobia, pain, discharge, redness, itching and visual disturbance.   Respiratory: Negative for apnea, cough, choking, chest tightness, shortness of breath, wheezing and stridor.    Cardiovascular: Negative for chest pain, palpitations and leg swelling.   Gastrointestinal: Negative for abdominal distention, abdominal pain, anal bleeding, blood in stool, constipation, diarrhea, nausea, rectal  "pain and vomiting.   Endocrine: Negative for cold intolerance, heat intolerance, polydipsia, polyphagia and polyuria.   Genitourinary: Negative for decreased urine volume, difficulty urinating, dysuria, enuresis, flank pain, frequency, genital sores, hematuria and urgency.   Musculoskeletal: Negative for arthralgias, back pain, gait problem, joint swelling, myalgias, neck pain and neck stiffness.   Skin: Negative for color change, pallor, rash and wound.   Allergic/Immunologic: Negative for environmental allergies, food allergies and immunocompromised state.   Neurological: Negative for dizziness, tremors, seizures, syncope, facial asymmetry, speech difficulty, weakness, light-headedness, numbness and headaches.   Hematological: Negative for adenopathy. Does not bruise/bleed easily.   Psychiatric/Behavioral: Negative for agitation, behavioral problems, confusion, decreased concentration, dysphoric mood, hallucinations, self-injury, sleep disturbance and suicidal ideas. The patient is not nervous/anxious and is not hyperactive.    All other systems reviewed and are negative.        The patient's past medical history, past surgical history, family history, and social history have been reviewed at length in the electronic medical record.     Past Medical History:   Diagnosis Date   • Arthritis    • Back pain    • Chronic kidney disease     \"weak kidneys\"   • High cholesterol    • Hypertension    • Psoriasis      Past Surgical History:   Procedure Laterality Date   • HERNIA REPAIR       Family History   Family history unknown: Yes     Social History     Socioeconomic History   • Marital status:      Spouse name: Not on file   • Number of children: Not on file   • Years of education: Not on file   • Highest education level: Not on file   Tobacco Use   • Smoking status: Former Smoker     Packs/day: 1.00     Quit date:      Years since quittin.6   • Smokeless tobacco: Never Used   • Tobacco comment: 1 year " "  Vaping Use   • Vaping Use: Never used   Substance and Sexual Activity   • Alcohol use: Yes     Alcohol/week: 3.0 standard drinks     Types: 3 Shots of liquor per week     Comment: bourbon   • Drug use: Never   • Sexual activity: Defer       Allergies   Allergen Reactions   • Penicillins Hives       Current Outpatient Medications on File Prior to Visit   Medication Sig Dispense Refill   • atorvastatin (LIPITOR) 10 MG tablet Take 40 mg by mouth Daily. Unknown      • atorvastatin (LIPITOR) 40 MG tablet Take 40 mg by mouth every night at bedtime.     • gabapentin (NEURONTIN) 100 MG capsule Take 100 mg by mouth 3 (Three) Times a Day.     • lisinopril (PRINIVIL,ZESTRIL) 10 MG tablet Take 40 mg by mouth Daily. Un known      • lisinopril (PRINIVIL,ZESTRIL) 40 MG tablet Take 40 mg by mouth Daily.     • Risankizumab-rzaa (SKYRIZI, 150 MG DOSE, SC) Inject 75 mg under the skin into the appropriate area as directed. Every 3 months     • [DISCONTINUED] Dietary Management Product (Rheumate) capsule Take 1 capsule by mouth Daily. 90 capsule 1   • [DISCONTINUED] vitamin B-6 (PYRIDOXINE) 100 MG tablet Take 1 tablet by mouth Daily. 30 tablet 0     No current facility-administered medications on file prior to visit.        Physical Exam:   Temp 97.5 °F (36.4 °C)   Ht 177.8 cm (70\")   Wt 73.2 kg (161 lb 6.4 oz)   BMI 23.16 kg/m²   MUSCULOSKELETAL:  Straight leg raising is negative.  Thomas's Sign is negative.  Tenderness in the back to palpation is not observed.  NEUROLOGICAL:  Strength is intact in the lower extremities to direct testing.  Muscle tone is normal throughout.  Station and gait are normal.  Sensation is intact to light touch testing throughout.        Medical Decision Making     Data Review:   (All imaging studies were personally reviewed unless stated otherwise)  CT myelogram demonstrates moderate stenosis at L3-4 and more mild to moderate stenosis at L4-5.     Diagnosis:   1.  Lumbar stenosis with neurogenic " claudication.  2.  Mechanical low back pain.     Treatment Options:   I have discussed treatment options with the patient including surgery or pursuing a trial of an epidural spinal cord stimulator.  Ultimately we have elected to pursue decompressive laminectomies at L3-4 and L4-5.  The nature of the procedure as well as the potential risks, complications, limitations, and alternatives to the procedure were discussed at length with the patient and the patient has agreed to proceed with surgery.  He is currently unvaccinated but is quite amenable to getting his vaccine.  He is going to go to his pharmacy to try and accomplish that.  If there are problems in that regard he will contact my office.          Diagnosis Plan   1. Spinal stenosis of lumbar region with neurogenic claudication      2. DDD (degenerative disc disease), lumbar

## 2021-09-08 NOTE — PROGRESS NOTES
Patient: Chris Patel  : 1945    Primary Care Provider: Shannan Rivera MD    Requesting Provider: As above        History    Chief Complaint: Low back pain with walking and standing intolerance.    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  He is now doing lawn maintenance such as weed eating.  He denies any inciting or precipitating events.  The pain occurs with activity such as bending, twisting, working.  However symptoms become more pronounced if he is on his feet too long.  He can sit down or lie down and his symptoms improve and then he can get up and go once again.  He has no bowel or bladder dysfunction.  He has no leg symptoms including pain, weakness, sensory alteration.  He has done physical therapy and chiropractic to no avail.  His symptoms are unchanged.  He has had injections but that has not helped very much.  When he is on his feet too long his back bothers him substantially.  He is able to sit down for 10 minutes and then get up and go again.    Review of Systems   Constitutional: Negative for activity change, appetite change, chills, diaphoresis, fatigue, fever and unexpected weight change.   HENT: Negative for congestion, dental problem, drooling, ear discharge, ear pain, facial swelling, hearing loss, mouth sores, nosebleeds, postnasal drip, rhinorrhea, sinus pressure, sinus pain, sneezing, sore throat, tinnitus, trouble swallowing and voice change.    Eyes: Negative for photophobia, pain, discharge, redness, itching and visual disturbance.   Respiratory: Negative for apnea, cough, choking, chest tightness, shortness of breath, wheezing and stridor.    Cardiovascular: Negative for chest pain, palpitations and leg swelling.   Gastrointestinal: Negative for abdominal distention, abdominal pain, anal bleeding, blood in stool, constipation, diarrhea, nausea, rectal pain and  "vomiting.   Endocrine: Negative for cold intolerance, heat intolerance, polydipsia, polyphagia and polyuria.   Genitourinary: Negative for decreased urine volume, difficulty urinating, dysuria, enuresis, flank pain, frequency, genital sores, hematuria and urgency.   Musculoskeletal: Negative for arthralgias, back pain, gait problem, joint swelling, myalgias, neck pain and neck stiffness.   Skin: Negative for color change, pallor, rash and wound.   Allergic/Immunologic: Negative for environmental allergies, food allergies and immunocompromised state.   Neurological: Negative for dizziness, tremors, seizures, syncope, facial asymmetry, speech difficulty, weakness, light-headedness, numbness and headaches.   Hematological: Negative for adenopathy. Does not bruise/bleed easily.   Psychiatric/Behavioral: Negative for agitation, behavioral problems, confusion, decreased concentration, dysphoric mood, hallucinations, self-injury, sleep disturbance and suicidal ideas. The patient is not nervous/anxious and is not hyperactive.    All other systems reviewed and are negative.      The patient's past medical history, past surgical history, family history, and social history have been reviewed at length in the electronic medical record.    Physical Exam:   Temp 97.5 °F (36.4 °C)   Ht 177.8 cm (70\")   Wt 73.2 kg (161 lb 6.4 oz)   BMI 23.16 kg/m²   MUSCULOSKELETAL:  Straight leg raising is negative.  Thomas's Sign is negative.  Tenderness in the back to palpation is not observed.  NEUROLOGICAL:  Strength is intact in the lower extremities to direct testing.  Muscle tone is normal throughout.  Station and gait are normal.  Sensation is intact to light touch testing throughout.      Medical Decision Making    Data Review:   (All imaging studies were personally reviewed unless stated otherwise)  CT myelogram demonstrates moderate stenosis at L3-4 and more mild to moderate stenosis at L4-5.    Diagnosis:   1.  Lumbar stenosis with " neurogenic claudication.  2.  Mechanical low back pain.    Treatment Options:   I have discussed treatment options with the patient including surgery or pursuing a trial of an epidural spinal cord stimulator.  Ultimately we have elected to pursue decompressive laminectomies at L3-4 and L4-5.  The nature of the procedure as well as the potential risks, complications, limitations, and alternatives to the procedure were discussed at length with the patient and the patient has agreed to proceed with surgery.  He is currently unvaccinated but is quite amenable to getting his vaccine.  He is going to go to his pharmacy to try and accomplish that.  If there are problems in that regard he will contact my office.       Diagnosis Plan   1. Spinal stenosis of lumbar region with neurogenic claudication     2. DDD (degenerative disc disease), lumbar         Scribed for Kenan Arevalo MD by JANET Del Castillo 9/8/2021 16:00 EDT      I, Dr. Arevalo, personally performed the services described in the documentation, as scribed in my presence, and it is both accurate and complete.

## 2021-10-15 ENCOUNTER — PRE-ADMISSION TESTING (OUTPATIENT)
Dept: PREADMISSION TESTING | Facility: HOSPITAL | Age: 76
End: 2021-10-15

## 2021-10-15 VITALS — BODY MASS INDEX: 22.69 KG/M2 | HEIGHT: 71 IN | WEIGHT: 162.04 LBS

## 2021-10-15 LAB
DEPRECATED RDW RBC AUTO: 49 FL (ref 37–54)
ERYTHROCYTE [DISTWIDTH] IN BLOOD BY AUTOMATED COUNT: 13.5 % (ref 12.3–15.4)
HBA1C MFR BLD: 4.9 % (ref 4.8–5.6)
HCT VFR BLD AUTO: 37.1 % (ref 37.5–51)
HGB BLD-MCNC: 12.5 G/DL (ref 13–17.7)
MCH RBC QN AUTO: 33.5 PG (ref 26.6–33)
MCHC RBC AUTO-ENTMCNC: 33.7 G/DL (ref 31.5–35.7)
MCV RBC AUTO: 99.5 FL (ref 79–97)
MRSA DNA SPEC QL NAA+PROBE: NEGATIVE
PLATELET # BLD AUTO: 195 10*3/MM3 (ref 140–450)
PMV BLD AUTO: 10.5 FL (ref 6–12)
POTASSIUM SERPL-SCNC: 4.3 MMOL/L (ref 3.5–5.2)
RBC # BLD AUTO: 3.73 10*6/MM3 (ref 4.14–5.8)
SARS-COV-2 RNA PNL SPEC NAA+PROBE: NOT DETECTED
WBC # BLD AUTO: 6.95 10*3/MM3 (ref 3.4–10.8)

## 2021-10-15 PROCEDURE — 84132 ASSAY OF SERUM POTASSIUM: CPT

## 2021-10-15 PROCEDURE — 36415 COLL VENOUS BLD VENIPUNCTURE: CPT

## 2021-10-15 PROCEDURE — 87641 MR-STAPH DNA AMP PROBE: CPT

## 2021-10-15 PROCEDURE — 85027 COMPLETE CBC AUTOMATED: CPT

## 2021-10-15 PROCEDURE — 83036 HEMOGLOBIN GLYCOSYLATED A1C: CPT

## 2021-10-15 PROCEDURE — U0005 INFEC AGEN DETEC AMPLI PROBE: HCPCS

## 2021-10-15 PROCEDURE — 93005 ELECTROCARDIOGRAM TRACING: CPT

## 2021-10-15 PROCEDURE — 93010 ELECTROCARDIOGRAM REPORT: CPT | Performed by: INTERNAL MEDICINE

## 2021-10-15 PROCEDURE — C9803 HOPD COVID-19 SPEC COLLECT: HCPCS

## 2021-10-15 PROCEDURE — U0004 COV-19 TEST NON-CDC HGH THRU: HCPCS

## 2021-10-15 RX ORDER — GABAPENTIN 300 MG/1
600 CAPSULE ORAL NIGHTLY
COMMUNITY

## 2021-10-16 LAB
QT INTERVAL: 400 MS
QTC INTERVAL: 419 MS

## 2021-10-17 ENCOUNTER — ANESTHESIA EVENT (OUTPATIENT)
Dept: PERIOP | Facility: HOSPITAL | Age: 76
End: 2021-10-17

## 2021-10-17 RX ORDER — SODIUM CHLORIDE 0.9 % (FLUSH) 0.9 %
10 SYRINGE (ML) INJECTION AS NEEDED
Status: CANCELLED | OUTPATIENT
Start: 2021-10-17

## 2021-10-17 RX ORDER — SODIUM CHLORIDE 0.9 % (FLUSH) 0.9 %
10 SYRINGE (ML) INJECTION EVERY 12 HOURS SCHEDULED
Status: CANCELLED | OUTPATIENT
Start: 2021-10-17

## 2021-10-18 ENCOUNTER — APPOINTMENT (OUTPATIENT)
Dept: GENERAL RADIOLOGY | Facility: HOSPITAL | Age: 76
End: 2021-10-18

## 2021-10-18 ENCOUNTER — HOSPITAL ENCOUNTER (OUTPATIENT)
Facility: HOSPITAL | Age: 76
Discharge: HOME | End: 2021-10-19
Attending: NEUROLOGICAL SURGERY | Admitting: NEUROLOGICAL SURGERY

## 2021-10-18 ENCOUNTER — ANESTHESIA (OUTPATIENT)
Dept: PERIOP | Facility: HOSPITAL | Age: 76
End: 2021-10-18

## 2021-10-18 DIAGNOSIS — M48.062 LUMBAR STENOSIS WITH NEUROGENIC CLAUDICATION: ICD-10-CM

## 2021-10-18 PROCEDURE — A9270 NON-COVERED ITEM OR SERVICE: HCPCS | Performed by: NEUROLOGICAL SURGERY

## 2021-10-18 PROCEDURE — 25010000002 DEXAMETHASONE PER 1 MG: Performed by: NURSE ANESTHETIST, CERTIFIED REGISTERED

## 2021-10-18 PROCEDURE — 63048 LAM FACETEC &FORAMOT EA ADDL: CPT | Performed by: NEUROLOGICAL SURGERY

## 2021-10-18 PROCEDURE — 25010000002 NEOSTIGMINE 10 MG/10ML SOLUTION: Performed by: NURSE ANESTHETIST, CERTIFIED REGISTERED

## 2021-10-18 PROCEDURE — 63710000001 ATORVASTATIN 40 MG TABLET: Performed by: NEUROLOGICAL SURGERY

## 2021-10-18 PROCEDURE — 63710000001 LISINOPRIL 40 MG TABLET: Performed by: NEUROLOGICAL SURGERY

## 2021-10-18 PROCEDURE — 63047 LAM FACETEC & FORAMOT LUMBAR: CPT | Performed by: NEUROLOGICAL SURGERY

## 2021-10-18 PROCEDURE — 25010000002 VANCOMYCIN PER 500 MG: Performed by: NEUROLOGICAL SURGERY

## 2021-10-18 PROCEDURE — 25010000002 HYDROMORPHONE PER 4 MG: Performed by: ANESTHESIOLOGY

## 2021-10-18 PROCEDURE — 63047 LAM FACETEC & FORAMOT LUMBAR: CPT | Performed by: PHYSICIAN ASSISTANT

## 2021-10-18 PROCEDURE — C1889 IMPLANT/INSERT DEVICE, NOC: HCPCS | Performed by: NEUROLOGICAL SURGERY

## 2021-10-18 PROCEDURE — 25010000003 LIDOCAINE 1 % SOLUTION: Performed by: NURSE ANESTHETIST, CERTIFIED REGISTERED

## 2021-10-18 PROCEDURE — 63048 LAM FACETEC &FORAMOT EA ADDL: CPT | Performed by: PHYSICIAN ASSISTANT

## 2021-10-18 PROCEDURE — 63710000001 IBUPROFEN 600 MG TABLET: Performed by: NEUROLOGICAL SURGERY

## 2021-10-18 PROCEDURE — 63710000001 GABAPENTIN 300 MG CAPSULE: Performed by: NEUROLOGICAL SURGERY

## 2021-10-18 PROCEDURE — 76000 FLUOROSCOPY <1 HR PHYS/QHP: CPT

## 2021-10-18 PROCEDURE — 63710000001 FAMOTIDINE 20 MG TABLET: Performed by: NEUROLOGICAL SURGERY

## 2021-10-18 PROCEDURE — 25010000002 ONDANSETRON PER 1 MG: Performed by: NURSE ANESTHETIST, CERTIFIED REGISTERED

## 2021-10-18 PROCEDURE — 25010000002 PROPOFOL 10 MG/ML EMULSION: Performed by: NURSE ANESTHETIST, CERTIFIED REGISTERED

## 2021-10-18 DEVICE — FLOSEAL HEMOSTATIC MATRIX, 10ML
Type: IMPLANTABLE DEVICE | Site: SPINE LUMBAR | Status: FUNCTIONAL
Brand: FLOSEAL HEMOSTATIC MATRIX

## 2021-10-18 DEVICE — HEMOST ABS SURGIFOAM SZ100 8X12 10MM: Type: IMPLANTABLE DEVICE | Site: SPINE LUMBAR | Status: FUNCTIONAL

## 2021-10-18 RX ORDER — PROMETHAZINE HYDROCHLORIDE 12.5 MG/1
12.5 SUPPOSITORY RECTAL EVERY 6 HOURS PRN
Status: DISCONTINUED | OUTPATIENT
Start: 2021-10-18 | End: 2021-10-19 | Stop reason: HOSPADM

## 2021-10-18 RX ORDER — MORPHINE SULFATE 2 MG/ML
2 INJECTION, SOLUTION INTRAMUSCULAR; INTRAVENOUS EVERY 4 HOURS PRN
Status: DISCONTINUED | OUTPATIENT
Start: 2021-10-18 | End: 2021-10-19 | Stop reason: HOSPADM

## 2021-10-18 RX ORDER — SODIUM CHLORIDE 9 MG/ML
INJECTION, SOLUTION INTRAVENOUS AS NEEDED
Status: DISCONTINUED | OUTPATIENT
Start: 2021-10-18 | End: 2021-10-18 | Stop reason: HOSPADM

## 2021-10-18 RX ORDER — NALOXONE HCL 0.4 MG/ML
0.4 VIAL (ML) INJECTION
Status: DISCONTINUED | OUTPATIENT
Start: 2021-10-18 | End: 2021-10-19 | Stop reason: HOSPADM

## 2021-10-18 RX ORDER — GLYCOPYRROLATE 0.2 MG/ML
INJECTION INTRAMUSCULAR; INTRAVENOUS AS NEEDED
Status: DISCONTINUED | OUTPATIENT
Start: 2021-10-18 | End: 2021-10-18 | Stop reason: SURG

## 2021-10-18 RX ORDER — MAGNESIUM HYDROXIDE 1200 MG/15ML
LIQUID ORAL AS NEEDED
Status: DISCONTINUED | OUTPATIENT
Start: 2021-10-18 | End: 2021-10-18 | Stop reason: HOSPADM

## 2021-10-18 RX ORDER — LIDOCAINE HYDROCHLORIDE 10 MG/ML
INJECTION, SOLUTION INFILTRATION; PERINEURAL AS NEEDED
Status: DISCONTINUED | OUTPATIENT
Start: 2021-10-18 | End: 2021-10-18 | Stop reason: SURG

## 2021-10-18 RX ORDER — EPHEDRINE SULFATE 50 MG/ML
INJECTION, SOLUTION INTRAVENOUS AS NEEDED
Status: DISCONTINUED | OUTPATIENT
Start: 2021-10-18 | End: 2021-10-18 | Stop reason: SURG

## 2021-10-18 RX ORDER — LISINOPRIL 40 MG/1
40 TABLET ORAL DAILY
Status: DISCONTINUED | OUTPATIENT
Start: 2021-10-18 | End: 2021-10-19 | Stop reason: HOSPADM

## 2021-10-18 RX ORDER — FENTANYL CITRATE 50 UG/ML
50 INJECTION, SOLUTION INTRAMUSCULAR; INTRAVENOUS
Status: DISCONTINUED | OUTPATIENT
Start: 2021-10-18 | End: 2021-10-18 | Stop reason: HOSPADM

## 2021-10-18 RX ORDER — DIPHENHYDRAMINE HCL 25 MG
25 CAPSULE ORAL NIGHTLY PRN
Status: DISCONTINUED | OUTPATIENT
Start: 2021-10-18 | End: 2021-10-19 | Stop reason: HOSPADM

## 2021-10-18 RX ORDER — GABAPENTIN 300 MG/1
600 CAPSULE ORAL NIGHTLY
Status: DISCONTINUED | OUTPATIENT
Start: 2021-10-18 | End: 2021-10-19 | Stop reason: HOSPADM

## 2021-10-18 RX ORDER — VANCOMYCIN HYDROCHLORIDE 1 G/200ML
15 INJECTION, SOLUTION INTRAVENOUS ONCE
Status: COMPLETED | OUTPATIENT
Start: 2021-10-18 | End: 2021-10-18

## 2021-10-18 RX ORDER — SODIUM CHLORIDE, SODIUM LACTATE, POTASSIUM CHLORIDE, CALCIUM CHLORIDE 600; 310; 30; 20 MG/100ML; MG/100ML; MG/100ML; MG/100ML
90 INJECTION, SOLUTION INTRAVENOUS CONTINUOUS
Status: DISCONTINUED | OUTPATIENT
Start: 2021-10-18 | End: 2021-10-19

## 2021-10-18 RX ORDER — FAMOTIDINE 20 MG/1
20 TABLET, FILM COATED ORAL
Status: COMPLETED | OUTPATIENT
Start: 2021-10-18 | End: 2021-10-18

## 2021-10-18 RX ORDER — HYDROMORPHONE HYDROCHLORIDE 1 MG/ML
0.5 INJECTION, SOLUTION INTRAMUSCULAR; INTRAVENOUS; SUBCUTANEOUS
Status: DISCONTINUED | OUTPATIENT
Start: 2021-10-18 | End: 2021-10-18 | Stop reason: HOSPADM

## 2021-10-18 RX ORDER — SODIUM CHLORIDE, SODIUM LACTATE, POTASSIUM CHLORIDE, CALCIUM CHLORIDE 600; 310; 30; 20 MG/100ML; MG/100ML; MG/100ML; MG/100ML
9 INJECTION, SOLUTION INTRAVENOUS CONTINUOUS
Status: DISCONTINUED | OUTPATIENT
Start: 2021-10-18 | End: 2021-10-19 | Stop reason: HOSPADM

## 2021-10-18 RX ORDER — SODIUM CHLORIDE 0.9 % (FLUSH) 0.9 %
10 SYRINGE (ML) INJECTION AS NEEDED
Status: DISCONTINUED | OUTPATIENT
Start: 2021-10-18 | End: 2021-10-19 | Stop reason: HOSPADM

## 2021-10-18 RX ORDER — NEOSTIGMINE METHYLSULFATE 1 MG/ML
INJECTION, SOLUTION INTRAVENOUS AS NEEDED
Status: DISCONTINUED | OUTPATIENT
Start: 2021-10-18 | End: 2021-10-18 | Stop reason: SURG

## 2021-10-18 RX ORDER — BUPIVACAINE HYDROCHLORIDE AND EPINEPHRINE 2.5; 5 MG/ML; UG/ML
INJECTION, SOLUTION EPIDURAL; INFILTRATION; INTRACAUDAL; PERINEURAL AS NEEDED
Status: DISCONTINUED | OUTPATIENT
Start: 2021-10-18 | End: 2021-10-18 | Stop reason: HOSPADM

## 2021-10-18 RX ORDER — PROMETHAZINE HYDROCHLORIDE 12.5 MG/1
12.5 TABLET ORAL EVERY 6 HOURS PRN
Status: DISCONTINUED | OUTPATIENT
Start: 2021-10-18 | End: 2021-10-19 | Stop reason: HOSPADM

## 2021-10-18 RX ORDER — AMOXICILLIN 250 MG
2 CAPSULE ORAL NIGHTLY PRN
Status: DISCONTINUED | OUTPATIENT
Start: 2021-10-18 | End: 2021-10-19 | Stop reason: HOSPADM

## 2021-10-18 RX ORDER — BISACODYL 10 MG
10 SUPPOSITORY, RECTAL RECTAL DAILY PRN
Status: DISCONTINUED | OUTPATIENT
Start: 2021-10-18 | End: 2021-10-19 | Stop reason: HOSPADM

## 2021-10-18 RX ORDER — ROCURONIUM BROMIDE 10 MG/ML
INJECTION, SOLUTION INTRAVENOUS AS NEEDED
Status: DISCONTINUED | OUTPATIENT
Start: 2021-10-18 | End: 2021-10-18 | Stop reason: SURG

## 2021-10-18 RX ORDER — ONDANSETRON 2 MG/ML
INJECTION INTRAMUSCULAR; INTRAVENOUS AS NEEDED
Status: DISCONTINUED | OUTPATIENT
Start: 2021-10-18 | End: 2021-10-18 | Stop reason: SURG

## 2021-10-18 RX ORDER — MIDAZOLAM HYDROCHLORIDE 1 MG/ML
0.5 INJECTION INTRAMUSCULAR; INTRAVENOUS
Status: DISCONTINUED | OUTPATIENT
Start: 2021-10-18 | End: 2021-10-18 | Stop reason: HOSPADM

## 2021-10-18 RX ORDER — SODIUM CHLORIDE 0.9 % (FLUSH) 0.9 %
3 SYRINGE (ML) INJECTION EVERY 12 HOURS SCHEDULED
Status: DISCONTINUED | OUTPATIENT
Start: 2021-10-18 | End: 2021-10-19 | Stop reason: HOSPADM

## 2021-10-18 RX ORDER — ONDANSETRON 2 MG/ML
4 INJECTION INTRAMUSCULAR; INTRAVENOUS EVERY 6 HOURS PRN
Status: DISCONTINUED | OUTPATIENT
Start: 2021-10-18 | End: 2021-10-19 | Stop reason: HOSPADM

## 2021-10-18 RX ORDER — DEXAMETHASONE SODIUM PHOSPHATE 4 MG/ML
INJECTION, SOLUTION INTRA-ARTICULAR; INTRALESIONAL; INTRAMUSCULAR; INTRAVENOUS; SOFT TISSUE AS NEEDED
Status: DISCONTINUED | OUTPATIENT
Start: 2021-10-18 | End: 2021-10-18 | Stop reason: SURG

## 2021-10-18 RX ORDER — LIDOCAINE HYDROCHLORIDE 10 MG/ML
0.5 INJECTION, SOLUTION EPIDURAL; INFILTRATION; INTRACAUDAL; PERINEURAL ONCE AS NEEDED
Status: COMPLETED | OUTPATIENT
Start: 2021-10-18 | End: 2021-10-18

## 2021-10-18 RX ORDER — IBUPROFEN 600 MG/1
600 TABLET ORAL EVERY 8 HOURS
Status: DISCONTINUED | OUTPATIENT
Start: 2021-10-18 | End: 2021-10-19 | Stop reason: HOSPADM

## 2021-10-18 RX ORDER — ACETAMINOPHEN 325 MG/1
650 TABLET ORAL EVERY 4 HOURS PRN
Status: DISCONTINUED | OUTPATIENT
Start: 2021-10-18 | End: 2021-10-19 | Stop reason: HOSPADM

## 2021-10-18 RX ORDER — ONDANSETRON 4 MG/1
4 TABLET, FILM COATED ORAL EVERY 6 HOURS PRN
Status: DISCONTINUED | OUTPATIENT
Start: 2021-10-18 | End: 2021-10-19 | Stop reason: HOSPADM

## 2021-10-18 RX ORDER — OXYCODONE AND ACETAMINOPHEN 7.5; 325 MG/1; MG/1
1 TABLET ORAL EVERY 4 HOURS PRN
Status: DISCONTINUED | OUTPATIENT
Start: 2021-10-18 | End: 2021-10-19 | Stop reason: HOSPADM

## 2021-10-18 RX ORDER — ATORVASTATIN CALCIUM 40 MG/1
40 TABLET, FILM COATED ORAL DAILY
Status: DISCONTINUED | OUTPATIENT
Start: 2021-10-18 | End: 2021-10-19 | Stop reason: HOSPADM

## 2021-10-18 RX ORDER — PROPOFOL 10 MG/ML
VIAL (ML) INTRAVENOUS AS NEEDED
Status: DISCONTINUED | OUTPATIENT
Start: 2021-10-18 | End: 2021-10-18 | Stop reason: SURG

## 2021-10-18 RX ADMIN — VANCOMYCIN HYDROCHLORIDE 1000 MG: 1 INJECTION, SOLUTION INTRAVENOUS at 21:18

## 2021-10-18 RX ADMIN — PROPOFOL 100 MG: 10 INJECTION, EMULSION INTRAVENOUS at 10:30

## 2021-10-18 RX ADMIN — LISINOPRIL 40 MG: 40 TABLET ORAL at 14:31

## 2021-10-18 RX ADMIN — LIDOCAINE HYDROCHLORIDE 0.2 ML: 10 INJECTION, SOLUTION EPIDURAL; INFILTRATION; INTRACAUDAL; PERINEURAL at 09:40

## 2021-10-18 RX ADMIN — ROCURONIUM BROMIDE 30 MG: 10 INJECTION, SOLUTION INTRAVENOUS at 10:14

## 2021-10-18 RX ADMIN — SODIUM CHLORIDE, POTASSIUM CHLORIDE, SODIUM LACTATE AND CALCIUM CHLORIDE 90 ML/HR: 600; 310; 30; 20 INJECTION, SOLUTION INTRAVENOUS at 14:39

## 2021-10-18 RX ADMIN — ONDANSETRON 4 MG: 2 INJECTION INTRAMUSCULAR; INTRAVENOUS at 11:13

## 2021-10-18 RX ADMIN — GABAPENTIN 600 MG: 300 CAPSULE ORAL at 21:17

## 2021-10-18 RX ADMIN — SODIUM CHLORIDE, PRESERVATIVE FREE 3 ML: 5 INJECTION INTRAVENOUS at 14:31

## 2021-10-18 RX ADMIN — IBUPROFEN 600 MG: 600 TABLET ORAL at 21:17

## 2021-10-18 RX ADMIN — LIDOCAINE HYDROCHLORIDE 50 MG: 10 INJECTION, SOLUTION INFILTRATION; PERINEURAL at 10:14

## 2021-10-18 RX ADMIN — DEXAMETHASONE SODIUM PHOSPHATE 8 MG: 4 INJECTION, SOLUTION INTRA-ARTICULAR; INTRALESIONAL; INTRAMUSCULAR; INTRAVENOUS; SOFT TISSUE at 10:33

## 2021-10-18 RX ADMIN — PROPOFOL 150 MG: 10 INJECTION, EMULSION INTRAVENOUS at 10:14

## 2021-10-18 RX ADMIN — HYDROMORPHONE HYDROCHLORIDE 0.5 MG: 1 INJECTION, SOLUTION INTRAMUSCULAR; INTRAVENOUS; SUBCUTANEOUS at 11:50

## 2021-10-18 RX ADMIN — EPHEDRINE SULFATE 10 MG: 50 INJECTION INTRAVENOUS at 10:45

## 2021-10-18 RX ADMIN — VANCOMYCIN HYDROCHLORIDE 1000 MG: 1 INJECTION, SOLUTION INTRAVENOUS at 09:49

## 2021-10-18 RX ADMIN — SODIUM CHLORIDE, PRESERVATIVE FREE 3 ML: 5 INJECTION INTRAVENOUS at 21:17

## 2021-10-18 RX ADMIN — EPHEDRINE SULFATE 10 MG: 50 INJECTION INTRAVENOUS at 10:48

## 2021-10-18 RX ADMIN — NEOSTIGMINE METHYLSULFATE 3 MG: 0.5 INJECTION INTRAVENOUS at 11:21

## 2021-10-18 RX ADMIN — GLYCOPYRROLATE 0.4 MG: 0.2 INJECTION INTRAMUSCULAR; INTRAVENOUS at 11:21

## 2021-10-18 RX ADMIN — FAMOTIDINE 20 MG: 20 TABLET ORAL at 09:49

## 2021-10-18 RX ADMIN — IBUPROFEN 600 MG: 600 TABLET ORAL at 14:31

## 2021-10-18 RX ADMIN — ATORVASTATIN CALCIUM 40 MG: 40 TABLET, FILM COATED ORAL at 14:31

## 2021-10-18 RX ADMIN — SODIUM CHLORIDE, POTASSIUM CHLORIDE, SODIUM LACTATE AND CALCIUM CHLORIDE 9 ML/HR: 600; 310; 30; 20 INJECTION, SOLUTION INTRAVENOUS at 09:40

## 2021-10-18 NOTE — PLAN OF CARE
Goal Outcome Evaluation:  Plan of Care Reviewed With: patient        Progress: improving  Outcome Summary: Alert, oriented x 4, and pleasant. VSS. Ambulates with assist x 1, walker, and gait belt. Hard of Hearing, bilateral hearing aids in place. No c/o pain.  will continue to monitor.

## 2021-10-18 NOTE — ANESTHESIA PROCEDURE NOTES
Airway  Urgency: elective    Date/Time: 10/18/2021 10:16 AM  Airway not difficult    General Information and Staff    Patient location during procedure: OR  CRNA: Starr Mcintyre CRNA    Indications and Patient Condition  Indications for airway management: airway protection    Preoxygenated: yes  MILS maintained throughout  Mask difficulty assessment: 2 - vent by mask + OA or adjuvant +/- NMBA    Final Airway Details  Final airway type: endotracheal airway      Successful airway: ETT  Cuffed: yes   Successful intubation technique: direct laryngoscopy  Endotracheal tube insertion site: oral  Blade: Medley  Blade size: 2  ETT size (mm): 7.5  Cormack-Lehane Classification: grade I - full view of glottis  Placement verified by: chest auscultation and capnometry   Cuff volume (mL): 6  Measured from: lips  ETT/EBT  to lips (cm): 21  Number of attempts at approach: 1  Assessment: lips, teeth, and gum same as pre-op and atraumatic intubation    Additional Comments  Pt to OR  12. Pt supine on stretcher, bilateral arms to the side. ASA monitors placed. Pre-O2 with 100% Oxygen. SIVI. Atraumatic intubation. +ETCO2, +BBS. Pt positioned prone with surgeon and OR staff assistance after OETT secured.

## 2021-10-18 NOTE — ANESTHESIA POSTPROCEDURE EVALUATION
Patient: Chris Patel    Procedure Summary     Date: 10/18/21 Room / Location:  [facility] /  [facility]    Anesthesia Start: 1009 Anesthesia Stop:     Procedure: LUMBAR LAMINECTOMY  L3-5 (N/A Spine Lumbar) Diagnosis:       Lumbar stenosis with neurogenic claudication      (Lumbar stenosis with neurogenic claudication [M48.062])    Surgeons: Kenan Arevalo MD Provider: Shannan Osei DO    Anesthesia Type: general ASA Status: 3          Anesthesia Type: general    Vitals  Vitals Value Taken Time   /77 10/18/21 1140   Temp 98 °F (36.7 °C) 10/18/21 1140   Pulse 67 10/18/21 1140   Resp 13 10/18/21 1140   SpO2 100 % 10/18/21 1140           Post Anesthesia Care and Evaluation    Patient location during evaluation: PACU  Patient participation: complete - patient participated  Level of consciousness: awake and alert  Pain score: 0  Pain management: adequate  Airway patency: patent  Anesthetic complications: No anesthetic complications  PONV Status: none  Cardiovascular status: hemodynamically stable and acceptable  Respiratory status: nonlabored ventilation, acceptable and nasal cannula  Hydration status: acceptable    Comments: Pt transferred to PACU with O2. Vital signs stable. Report to PACU RN and care accepted.

## 2021-10-18 NOTE — H&P
"  Pre-Op H&P  Chris Patel  [id number]  1945      Chief complaint: Back pain      Subjective:  Patient is a 76 y.o.male presents for scheduled surgery by Dr. Arevalo. He anticipates a LUMBAR LAMINECTOMY  L3-5 today.  He has had intermittent back pain for many years.  He uses a cane for ambulation.  He denies recent falls.  He denies radicular symptoms or saddle anesthesia.  He tried injections and physical therapy with little benefit.      Review of Systems:  Constitutional-- No fever, chills or sweats. No fatigue.  CV-- No chest pain, palpitation or syncope. + HTN, HLD  Resp-- No SOB, cough, hemoptysis  Skin--No rashes or lesions      Allergies:   Allergies   Allergen Reactions   • Penicillins Hives         Home Meds:  Medications Prior to Admission   Medication Sig Dispense Refill Last Dose   • atorvastatin (LIPITOR) 40 MG tablet Take 40 mg by mouth every night at bedtime.   10/17/2021 at Unknown time   • gabapentin (NEURONTIN) 300 MG capsule Take 600 mg by mouth Every Night.   10/17/2021 at Unknown time   • lisinopril (PRINIVIL,ZESTRIL) 40 MG tablet Take 40 mg by mouth Daily.   10/17/2021 at Unknown time   • Risankizumab-rzaa (SKYRIZI, 150 MG DOSE, SC) Inject 75 mg under the skin into the appropriate area as directed. Every 3 months-   Ld 8th   10/17/2021 at Unknown time         PMH:   Past Medical History:   Diagnosis Date   • Arthritis    • Back pain    • Cancer (HCC)     pre skin cancerous areas - few    • Chronic kidney disease     \"weak kidneys\"   • Full dentures     full top and partial at bottom    • High cholesterol    • [place] (hard of hearing)     bilat hearing aids    • Hypertension    • Psoriasis    • Wears glasses    • Wears hearing aid     bilat      PSH:    Past Surgical History:   Procedure Laterality Date   • HERNIA REPAIR      umbi;lical hernia - with mesh        Immunization History:  Influenza: 2021  Pneumococcal: No  Tetanus: No  Covid x2: 2021    Social History:   Tobacco:   Social History " "    Tobacco Use   Smoking Status Former Smoker   • Packs/day: 1.00   • Types: Cigarettes   • Quit date:    • Years since quittin.7   Smokeless Tobacco Never Used   Tobacco Comment    smoked on and off       Alcohol:     Social History     Substance and Sexual Activity   Alcohol Use Yes   • Alcohol/week: 7.0 standard drinks   • Types: 7 Shots of liquor per week    Comment: bourbon         Physical Exam:/96 (BP Location: Right arm, Patient Position: Sitting)   Pulse 62   Temp 97.7 °F (36.5 °C) (Temporal)   Resp 18   Ht 179.1 cm (70.5\")   Wt 73.5 kg (162 lb)   SpO2 98%   BMI 22.92 kg/m²       General Appearance:    Alert, cooperative, no distress, appears stated age   Head:    Normocephalic, without obvious abnormality, atraumatic   Lungs:     Clear to auscultation bilaterally, respirations unlabored    Heart:   Regular rate and rhythm, S1 and S2 normal    Abdomen:    Soft without tenderness   Extremities:   Extremities normal, atraumatic, no cyanosis or edema   Skin:   Skin color, texture, turgor normal, no rashes or lesions   Neurologic:   Grossly intact     Results Review:     LABS:  Lab Results   Component Value Date    WBC 6.95 10/15/2021    HGB 12.5 (L) 10/15/2021    HCT 37.1 (L) 10/15/2021    MCV 99.5 (H) 10/15/2021     10/15/2021    K 4.3 10/15/2021       RADIOLOGY:  21 CT lumbar spine:  IMPRESSION:  Multilevel lumbar spondylosis, fairly mild and most apparent  at the L3-4 level where there is mild associated spinal canal and  bilateral neuroforaminal narrowing.    I reviewed the patient's new clinical results.    Cancer Staging (if applicable)  Cancer Patient: __ yes __no __unknown; If yes, clinical stage T:__ N:__M:__, stage group or __N/A      Impression: Lumbar stenosis with neurogenic claudication      Plan: LUMBAR LAMINECTOMY  L3-5      MARSHALL Sims   10/18/2021   10:07 EDT  "

## 2021-10-18 NOTE — OP NOTE
NEUROSURGICAL OPERATIVE NOTE        PREOPERATIVE DIAGNOSIS:    Lumbar stenosis with neurogenic claudication      POSTOPERATIVE DIAGNOSIS:  Same      PROCEDURE:  L3-4 and L4-5 laminectomies with medial facetectomies and foraminotomies      SURGEON:  Kenan Arevalo M.D.      ASSISTANT: Hali Anaya PA-C    PAC assisted with:   Suctioning   Retraction   Tying   Suturing   Closing   Application of dressing   Skilled neurosurgery PA assistance was necessary to perform this procedure.        ANESTHESIA:  General      ESTIMATED BLOOD LOSS: Minimal      SPECIMEN: None      DRAINS: 7 flat SAM      COMPLICATIONS:  None      CLINICAL NOTE:  The patient is a 76-year-old gentleman with progressive low back pain with walking and standing intolerance.  Studies demonstrate generous lumbar stenosis and as such he presents at this time for lumbar decompression.  The nature of the procedure as well as the potential risks, complications, limitations, and alternatives to the procedure were discussed at length with the patient and the patient has agreed to proceed with surgery.      TECHNICAL NOTE:  The patient was brought to the operating room and while on his cart general endotracheal anesthesia was achieved. He was then turned prone onto the Cloward saddle frame . Special care was ensured to protect pressure points. His low back was prepared and draped in the usual fashion. A localizing radiograph was obtained with a spinal needle in the lumbosacral midline. Based on this a 4 cm vertical incision was fashioned overlying the L3-4 and L4-5 posterior spinal elements. Underlying tissues were divided with cautery to provide exposure to the posterior spinal elements. Another radiograph confirmed the operative level. Leksell rongeur was then utilized to remove the spinous process at L3, L4 and the upper aspect of L5. Drill was utilized to fashion the central trough which was widened using drill and Kerrison punches. Thickened  interlaminar ligament and overgrown facet was resected. Once again the facet complexes were undercut. The neural foramina were decompressed with Kerrison punches. At completion of the procedure an angled ball probe could easily be passed along the nerve root into the foramina. Bleeding points were controlled with bone wax and Floseal. With a Valsalva maneuver there was no significant bleeding or evidence of CSF leak. The wound was washed out with a saline solution. A 7 flat SAM drain was brought in through a separate stab incision and left in the epidural space. The paraspinous muscle and fascia were reapproximated in interrupted fashion with #0 Vicryl suture. Then 0.25% Marcaine was instilled in the paraspinous musculature and subcutaneous tissue. The subcutaneous tissues were closed in layers with 2-0 followed by 3-0 Vicryl suture. The skin was closed in a running subcuticular fashion with 3-0 Vicryl suture. Steri-Strips and sterile dressing were applied. He was rolled onto his cart, extubated, and taken to the recovery room in satisfactory condition.             Kenan Arevalo M.D.

## 2021-10-18 NOTE — ANESTHESIA PREPROCEDURE EVALUATION
Anesthesia Evaluation     Patient summary reviewed and Nursing notes reviewed                Airway   Mallampati: II  TM distance: >3 FB  Neck ROM: full  No difficulty expected  Dental - normal exam   (+) lower dentures and upper dentures    Pulmonary - normal exam   (+) a smoker Former,   Cardiovascular - normal exam    (+) hypertension, hyperlipidemia,       Neuro/Psych- negative ROS  GI/Hepatic/Renal/Endo    (+)   renal disease CRI,     Musculoskeletal     (+) back pain,   Abdominal  - normal exam    Bowel sounds: normal.   Substance History   (+) alcohol use,      OB/GYN negative ob/gyn ROS         Other   arthritis,    history of cancer (SKIN)                  Anesthesia Plan    ASA 3     general     intravenous induction     Anesthetic plan, all risks, benefits, and alternatives have been provided, discussed and informed consent has been obtained with: patient.    Plan discussed with CRNA.

## 2021-10-19 VITALS
OXYGEN SATURATION: 96 % | SYSTOLIC BLOOD PRESSURE: 121 MMHG | DIASTOLIC BLOOD PRESSURE: 86 MMHG | HEART RATE: 62 BPM | WEIGHT: 162 LBS | HEIGHT: 71 IN | RESPIRATION RATE: 16 BRPM | TEMPERATURE: 99 F | BODY MASS INDEX: 22.68 KG/M2

## 2021-10-19 PROCEDURE — A9270 NON-COVERED ITEM OR SERVICE: HCPCS | Performed by: NEUROLOGICAL SURGERY

## 2021-10-19 PROCEDURE — 99024 POSTOP FOLLOW-UP VISIT: CPT | Performed by: NEUROLOGICAL SURGERY

## 2021-10-19 PROCEDURE — 63710000001 IBUPROFEN 600 MG TABLET: Performed by: NEUROLOGICAL SURGERY

## 2021-10-19 PROCEDURE — 63710000001 LISINOPRIL 40 MG TABLET: Performed by: NEUROLOGICAL SURGERY

## 2021-10-19 PROCEDURE — 63710000001 ATORVASTATIN 40 MG TABLET: Performed by: NEUROLOGICAL SURGERY

## 2021-10-19 RX ORDER — HYDROCODONE BITARTRATE AND ACETAMINOPHEN 7.5; 325 MG/1; MG/1
1 TABLET ORAL 3 TIMES DAILY PRN
Qty: 15 TABLET | Refills: 0 | Status: SHIPPED | OUTPATIENT
Start: 2021-10-19 | End: 2021-12-21

## 2021-10-19 RX ADMIN — SODIUM CHLORIDE, POTASSIUM CHLORIDE, SODIUM LACTATE AND CALCIUM CHLORIDE 90 ML/HR: 600; 310; 30; 20 INJECTION, SOLUTION INTRAVENOUS at 03:48

## 2021-10-19 RX ADMIN — ATORVASTATIN CALCIUM 40 MG: 40 TABLET, FILM COATED ORAL at 08:01

## 2021-10-19 RX ADMIN — IBUPROFEN 600 MG: 600 TABLET ORAL at 06:00

## 2021-10-19 RX ADMIN — SODIUM CHLORIDE, PRESERVATIVE FREE 3 ML: 5 INJECTION INTRAVENOUS at 08:02

## 2021-10-19 NOTE — PROGRESS NOTES
"NEUROSURGERY PROGRESS NOTE     LOS: 0 days   Patient Care Team:  Shannan Rivera MD as PCP - General (Internal Medicine & Pediatrics)  Shannan Rivera MD as Referring Physician (Internal Medicine & Pediatrics)    Chief Complaint: Low back pain with walking and standing intolerance.    POD#: 1 Day Post-Op  Procedures:  L3-4 and L4-5 laminectomies.    Interval History:   Patient Complaints: Incisional pain and insomnia.  He has some voiding difficulty but that is improved.  Patient Denies: Extremity weakness or numbness.    Vital Signs: Blood pressure 108/66, pulse 55, temperature 98.6 °F (37 °C), temperature source Oral, resp. rate 16, height 179.1 cm (70.5\"), weight 73.5 kg (162 lb), SpO2 96 %.  Intake/Output:     Intake/Output Summary (Last 24 hours) at 10/19/2021 0600  Last data filed at 10/19/2021 0357  Gross per 24 hour   Intake 1100 ml   Output 300 ml   Net 800 ml     Drain output: 25/125 mL.    Physical Exam:  Patient is awake and alert.  He is well oriented.  Mild heme staining is noted on his dressing.  Motor function is intact in his lower extremities.    Assessment/Plan:  1.  Lumbar stenosis with neurogenic claudication status post decompression.  2.  Urinary retention: Improving.  3.  Disposition: Mobilize patient.  Probably drain out this afternoon and then home.      Kenan Arevalo MD  10/19/21  06:00 EDT    "

## 2021-10-19 NOTE — PLAN OF CARE
Goal Outcome Evaluation:  Plan of Care Reviewed With: patient        Progress: improving   Pt is alert and oriented x4. VSS on room air. Pt has been awake the majority of the shift. Minimal c/o of discomfort. Scheduled ibuprofen given as ordered. Dressing to back is CDI with small amount of drainage. SAM drain had 125mL of bloody drainage out this shift. Pt ambulated 300ft in the chang with an assist x1, gait belt, and walker. Pt has had some difficulty voiding. Bladder scanned at 0357 with 406mL. Will continue to monitor.

## 2021-10-21 ENCOUNTER — NURSE TRIAGE (OUTPATIENT)
Dept: CALL CENTER | Facility: HOSPITAL | Age: 76
End: 2021-10-21

## 2021-10-21 NOTE — TELEPHONE ENCOUNTER
"Advised caller to change the dressing on his incision because it is no longer effective. States he called the surgeon's office and left a VM message. They have not called back yet.     Reason for Disposition  • [1] Caller requesting NON-URGENT health information AND [2] PCP's office is the best resource    Additional Information  • Negative: [1] Caller is not with the adult (patient) AND [2] reporting urgent symptoms  • Negative: Lab result questions  • Negative: Medication questions  • Negative: Caller can't be reached by phone  • Negative: Caller has already spoken to PCP or another triager  • Negative: RN needs further essential information from caller in order to complete triage  • Negative: Requesting regular office appointment    Answer Assessment - Initial Assessment Questions  1. REASON FOR CALL or QUESTION: \"What is your reason for calling today?\" or \"How can I best help you?\" or \"What question do you have that I can help answer?\"      Caller asking if he should change the dressing on his back. States \"it is all wadded\".    Protocols used: INFORMATION ONLY CALL - NO TRIAGE-ADULT-      "

## 2021-11-09 ENCOUNTER — OFFICE VISIT (OUTPATIENT)
Dept: NEUROSURGERY | Facility: CLINIC | Age: 76
End: 2021-11-09

## 2021-11-09 VITALS
HEIGHT: 71 IN | BODY MASS INDEX: 22.4 KG/M2 | WEIGHT: 160 LBS | SYSTOLIC BLOOD PRESSURE: 126 MMHG | TEMPERATURE: 97.6 F | DIASTOLIC BLOOD PRESSURE: 70 MMHG

## 2021-11-09 DIAGNOSIS — M47.819 FACET ARTHROPATHY: ICD-10-CM

## 2021-11-09 DIAGNOSIS — M51.36 DDD (DEGENERATIVE DISC DISEASE), LUMBAR: ICD-10-CM

## 2021-11-09 DIAGNOSIS — M48.062 SPINAL STENOSIS OF LUMBAR REGION WITH NEUROGENIC CLAUDICATION: Primary | ICD-10-CM

## 2021-11-09 DIAGNOSIS — M54.9 MECHANICAL BACK PAIN: ICD-10-CM

## 2021-11-09 PROCEDURE — 99024 POSTOP FOLLOW-UP VISIT: CPT | Performed by: PHYSICIAN ASSISTANT

## 2021-11-09 NOTE — PROGRESS NOTES
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  He presented with low back pain with walking and standing intolerance.  Preoperative studies demonstrated generous lumbar stenosis at L3-4 and L4-5.  As such, on 10/18/2021 he underwent decompressive laminectomies at these levels.    Today patient is 3 weeks postop.  He is not sure whether he has noticed any improvement.  He has been trying to do some walking.  no new or progressive symptoms.  No symptoms into his legs. 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       Past Medical History:   Diagnosis Date   • Arthritis    • Back pain    • Cancer (HCC)     pre skin cancerous areas - few    • Chronic kidney disease     \"weak kidneys\"   • Full dentures     full top and partial at bottom    • High cholesterol    • [place] (hard of hearing)     bilat hearing aids    • Hypertension    • Psoriasis    • Wears glasses    • Wears hearing aid     bilat          Current Outpatient Medications:   •  atorvastatin (LIPITOR) 40 MG tablet, Take 40 mg by mouth every night at bedtime., Disp: , Rfl:   •  gabapentin (NEURONTIN) 300 MG capsule, Take 600 mg by mouth Every Night., Disp: , Rfl:   •  HYDROcodone-acetaminophen (NORCO) 7.5-325 MG per tablet, Take 1 tablet by mouth 3 (Three) Times a Day As Needed for Moderate Pain  (Pain)., Disp: 15 tablet, Rfl: 0  •  lisinopril (PRINIVIL,ZESTRIL) 40 MG tablet, Take 40 mg by mouth Daily., Disp: , Rfl:   •  Risankizumab-rzaa (SKYRIZI, 150 MG DOSE, SC), Inject 75 mg under the skin into the appropriate area as directed. Every 3 months-   Ld 8th, Disp: , Rfl:     Past Surgical History:   Procedure Laterality Date   • HERNIA REPAIR      umbi;lical hernia - with mesh    • LUMBAR LAMINECTOMY DISCECTOMY " "DECOMPRESSION N/A 10/18/2021    Procedure: LUMBAR LAMINECTOMY  L3-5;  Surgeon: Kenan Arevalo MD;  Location: [facility];  Service: Neurosurgery;  Laterality: N/A;       Social History     Socioeconomic History   • Marital status:    Tobacco Use   • Smoking status: Former Smoker     Packs/day: 1.00     Types: Cigarettes     Quit date:      Years since quittin.8   • Smokeless tobacco: Never Used   • Tobacco comment: smoked on and off    Vaping Use   • Vaping Use: Never used   Substance and Sexual Activity   • Alcohol use: Yes     Alcohol/week: 7.0 standard drinks     Types: 7 Shots of liquor per week     Comment: bourbon   • Drug use: Never   • Sexual activity: Defer         Review of Systems   Musculoskeletal: Positive for back pain.   All other systems reviewed and are negative.      Objective   Vital Signs: Blood pressure 126/70, temperature 97.6 °F (36.4 °C), height 179.1 cm (70.5\"), weight 72.6 kg (160 lb).  Physical Exam  Vitals and nursing note reviewed.   Constitutional:       General: He is not in acute distress.     Appearance: He is well-developed.   Cardiovascular:      Heart sounds: Normal heart sounds.   Pulmonary:      Breath sounds: Normal breath sounds.   Skin:     Comments: Well healed lumbar incision      Psychiatric:         Behavior: Behavior normal.         Thought Content: Thought content normal.         Assessment/Plan   Diagnosis: Lumbar stenosis with neurogenic claudication status post laminectomies L3-4 and L4-5    Medical Decision Making: Patient is 3 weeks post-op.  Im not sure that he has noticed much improvement in his pre-operative symptoms just yet. He likely  needs more time. I would like him to do some more walking. He wants to get back to [employer] tomorrow which I have cautioned him- it may be a little too early for that. He will follow up with Dr Arevalo in 6 weeks.         Diagnoses and all orders for this visit:    1. Spinal stenosis of lumbar region with " neurogenic claudication (Primary)    2. DDD (degenerative disc disease), lumbar    3. Mechanical back pain    4. Facet arthropathy                        Florencia Page PA-C  Patient Care Team:  Shannan Rivera MD as PCP - General (Internal Medicine & Pediatrics)  Shannan Rivera MD as Referring Physician (Internal Medicine & Pediatrics)

## 2021-12-21 ENCOUNTER — OFFICE VISIT (OUTPATIENT)
Dept: NEUROSURGERY | Facility: CLINIC | Age: 76
End: 2021-12-21

## 2021-12-21 VITALS — WEIGHT: 167 LBS | BODY MASS INDEX: 23.38 KG/M2 | HEIGHT: 71 IN | TEMPERATURE: 97.3 F

## 2021-12-21 DIAGNOSIS — M51.36 DDD (DEGENERATIVE DISC DISEASE), LUMBAR: ICD-10-CM

## 2021-12-21 DIAGNOSIS — M54.9 MECHANICAL BACK PAIN: ICD-10-CM

## 2021-12-21 DIAGNOSIS — M47.819 FACET ARTHROPATHY: ICD-10-CM

## 2021-12-21 DIAGNOSIS — M48.062 SPINAL STENOSIS OF LUMBAR REGION WITH NEUROGENIC CLAUDICATION: ICD-10-CM

## 2021-12-21 DIAGNOSIS — Z98.890 S/P LUMBAR LAMINECTOMY: Primary | ICD-10-CM

## 2021-12-21 PROBLEM — F17.210 CIGARETTE SMOKER: Status: ACTIVE | Noted: 2021-12-21

## 2021-12-21 PROBLEM — R94.4 DECREASED GFR: Status: ACTIVE | Noted: 2021-12-21

## 2021-12-21 PROBLEM — IMO0002 BODY MASS INDEX (BMI) OF 20 TO 24: Status: ACTIVE | Noted: 2021-12-21

## 2021-12-21 PROBLEM — E78.2 MIXED HYPERLIPIDEMIA: Status: ACTIVE | Noted: 2021-12-21

## 2021-12-21 PROBLEM — I10 HYPERTENSION, BENIGN: Status: ACTIVE | Noted: 2021-12-21

## 2021-12-21 PROBLEM — Z13.31 SCREENING FOR DEPRESSION: Status: ACTIVE | Noted: 2021-12-21

## 2021-12-21 PROBLEM — N17.9 AKI (ACUTE KIDNEY INJURY) (HCC): Status: ACTIVE | Noted: 2021-12-21

## 2021-12-21 PROBLEM — M54.50 BILATERAL LOW BACK PAIN WITHOUT SCIATICA: Status: ACTIVE | Noted: 2021-12-21

## 2021-12-21 PROCEDURE — 99024 POSTOP FOLLOW-UP VISIT: CPT | Performed by: NEUROLOGICAL SURGERY

## 2021-12-21 NOTE — PROGRESS NOTES
Patient: Chris Patel  : 1945    Primary Care Provider: Shannan Rivera MD    Requesting Provider: As above        History    Chief Complaint: Low back pain with walking and standing intolerance.    History of Present Illness: Mr. Patel is a 76-year-old former  and  who presented with neurogenic claudication type symptoms.  On 10/18/2021 he underwent decompressive laminectomies at L3-4 and L4-5.  Has not noticed a lot of difference in his symptoms.  He has been fairly modest in his activities.    Review of Systems   Constitutional: Negative for activity change, appetite change, chills, diaphoresis, fatigue, fever and unexpected weight change.   HENT: Negative for congestion, dental problem, drooling, ear discharge, ear pain, facial swelling, hearing loss, mouth sores, nosebleeds, postnasal drip, rhinorrhea, sinus pressure, sinus pain, sneezing, sore throat, tinnitus, trouble swallowing and voice change.    Eyes: Negative for photophobia, pain, discharge, redness, itching and visual disturbance.   Respiratory: Negative for apnea, cough, choking, chest tightness, shortness of breath, wheezing and stridor.    Cardiovascular: Negative for chest pain, palpitations and leg swelling.   Gastrointestinal: Negative for abdominal distention, abdominal pain, anal bleeding, blood in stool, constipation, diarrhea, nausea, rectal pain and vomiting.   Endocrine: Negative for cold intolerance, heat intolerance, polydipsia, polyphagia and polyuria.   Genitourinary: Negative for decreased urine volume, difficulty urinating, dysuria, enuresis, flank pain, frequency, genital sores, hematuria and urgency.   Musculoskeletal: Positive for back pain. Negative for arthralgias, gait problem, joint swelling, myalgias, neck pain and neck stiffness.   Skin: Negative for color change, pallor, rash and wound.   Allergic/Immunologic: Negative for environmental allergies, food allergies and immunocompromised  "state.   Neurological: Negative for dizziness, tremors, seizures, syncope, facial asymmetry, speech difficulty, weakness, light-headedness, numbness and headaches.   Hematological: Negative for adenopathy. Does not bruise/bleed easily.   Psychiatric/Behavioral: Negative for agitation, behavioral problems, confusion, decreased concentration, dysphoric mood, hallucinations, self-injury, sleep disturbance and suicidal ideas. The patient is not nervous/anxious and is not hyperactive.    All other systems reviewed and are negative.      The patient's past medical history, past surgical history, family history, and social history have been reviewed at length in the electronic medical record.    Physical Exam:   Temp 97.3 °F (36.3 °C)   Ht 179.1 cm (70.51\")   Wt 75.8 kg (167 lb)   BMI 23.62 kg/m²   The patient moves about comfortably.  Lumbar incision looks great.    Medical Decision Making      Diagnosis:   1  Lumbar stenosis with neurogenic claudication.  2.  Mechanical low back pain.    Treatment Options:   He is still having a fair amount of symptomatology and that could be an indicator that more of his pain is mechanical in nature as opposed to stenosis related.  He will escalate his activities.  He will follow-up in our clinic in about 3 months to check on his progress.       Diagnosis Plan   1. S/P lumbar laminectomy     2. Spinal stenosis of lumbar region with neurogenic claudication     3. DDD (degenerative disc disease), lumbar     4. Mechanical back pain     5. Facet arthropathy         Scribed for Kenan Arevalo MD by Khadra Prado CMA on 12/21/2021 11:15 EST       I, Dr. Arevalo, personally performed the services described in the documentation, as scribed in my presence, and it is both accurate and complete.  "

## 2022-03-22 ENCOUNTER — OFFICE VISIT (OUTPATIENT)
Dept: NEUROSURGERY | Facility: CLINIC | Age: 77
End: 2022-03-22

## 2022-03-22 VITALS
DIASTOLIC BLOOD PRESSURE: 76 MMHG | BODY MASS INDEX: 23.66 KG/M2 | HEIGHT: 71 IN | WEIGHT: 169 LBS | SYSTOLIC BLOOD PRESSURE: 130 MMHG | TEMPERATURE: 98 F

## 2022-03-22 DIAGNOSIS — M48.062 LUMBAR STENOSIS WITH NEUROGENIC CLAUDICATION: Primary | ICD-10-CM

## 2022-03-22 PROCEDURE — 99213 OFFICE O/P EST LOW 20 MIN: CPT | Performed by: PHYSICIAN ASSISTANT

## 2022-03-22 RX ORDER — TRAZODONE HYDROCHLORIDE 50 MG/1
50 TABLET ORAL DAILY
COMMUNITY
Start: 2022-03-17

## 2022-03-22 NOTE — PROGRESS NOTES
"Patient: Chris Patle  : 1945  Gender: male    Primary Care Provider: Shannan Rivera MD    Requesting Provider:  No referring provider defined for this encounter.     Chief Complaint: Low back pain with standing walking intolerance    History of Present Illness:  Mr. Patel is a 76-year-old gentleman who is known to Dr. Arevalo's service for undergoing decompressive laminectomies L3-4 and L4-5 and on 10/18/2021.  Postoperatively he has not noticed a huge improvement in his symptoms.  He continues to stay active however feels that his low back gets tired often.  Pain persists in his lumbar region and occasionally radiates up to his neck.  He denies leg pain.  His back pain is worse with extended activity.  He denies any new weakness or bowel or bladder difficulties.      Past Medical and Surgical History:  Past Medical History:   Diagnosis Date   • Arthritis    • Back pain    • Cancer (HCC)     pre skin cancerous areas - few    • Chronic kidney disease     \"weak kidneys\"   • Full dentures     full top and partial at bottom    • High cholesterol    • [place] (hard of hearing)     bilat hearing aids    • Hypertension    • Psoriasis    • Wears glasses    • Wears hearing aid     bilat      Past Surgical History:   Procedure Laterality Date   • HERNIA REPAIR      umbi;lical hernia - with mesh    • LUMBAR LAMINECTOMY DISCECTOMY DECOMPRESSION N/A 10/18/2021    Procedure: LUMBAR LAMINECTOMY  L3-5;  Surgeon: Kenan Arevalo MD;  Location: [facility];  Service: Neurosurgery;  Laterality: N/A;       Current Medications:    Current Outpatient Medications:   •  atorvastatin (LIPITOR) 40 MG tablet, Take 40 mg by mouth every night at bedtime., Disp: , Rfl:   •  gabapentin (NEURONTIN) 300 MG capsule, Take 600 mg by mouth Every Night., Disp: , Rfl:   •  lisinopril (PRINIVIL,ZESTRIL) 40 MG tablet, Take 40 mg by mouth Daily., Disp: , Rfl:   •  Risankizumab-rzaa (SKYRIZI, 150 MG DOSE, SC), Inject 75 mg under the skin into the " "appropriate area as directed. Every 3 months-   Ld 8th, Disp: , Rfl:   •  traZODone (DESYREL) 50 MG tablet, Take 50 mg by mouth Daily., Disp: , Rfl:     Allergies:  Allergies   Allergen Reactions   • Penicillins Hives         Review of Systems   Musculoskeletal: Positive for back pain.         Physical Exam  Constitutional:       Appearance: Normal appearance.   HENT:      Head: Normocephalic and atraumatic.   Musculoskeletal:         General: Normal range of motion.      Cervical back: Normal range of motion and neck supple.   Skin:     General: Skin is warm and dry.   Neurological:      Mental Status: He is alert and oriented to person, place, and time.      Sensory: Sensation is intact.      Motor: Motor function is intact.      Coordination: Coordination is intact.      Gait: Gait is intact.   Psychiatric:         Mood and Affect: Mood normal.         Behavior: Behavior normal.           Vitals:    03/22/22 1052   BP: 130/76   BP Location: Left arm   Patient Position: Sitting   Cuff Size: Adult   Temp: 98 °F (36.7 °C)   TempSrc: Infrared   Weight: 76.7 kg (169 lb)   Height: 179.1 cm (70.5\")       Patient's Body mass index is 23.91 kg/m². indicating that he is within normal range (BMI 18.5-24.9). No BMI management plan needed..      Assessment:  1.  Lumbar stenosis with claudication  2. Mechanical low back pain    Plan:  Mr. Patel is seen today in follow-up.  He continues to struggle with low back pain.  His pain is worse with activity, however does not appear consistent with claudication.  He is still very active around his home.  I have ordered physical therapy for him to start.  I will see him back in a couple of months see how he is doing.  He will call with concerns prior to follow-up.        Ernestine Becerra PA-C  "

## 2022-03-31 ENCOUNTER — TELEPHONE (OUTPATIENT)
Dept: NEUROSURGERY | Facility: CLINIC | Age: 77
End: 2022-03-31

## 2022-04-23 ENCOUNTER — APPOINTMENT (OUTPATIENT)
Dept: GENERAL RADIOLOGY | Facility: HOSPITAL | Age: 77
End: 2022-04-23

## 2022-04-23 ENCOUNTER — HOSPITAL ENCOUNTER (EMERGENCY)
Facility: HOSPITAL | Age: 77
Discharge: HOME | End: 2022-04-23
Attending: EMERGENCY MEDICINE | Admitting: EMERGENCY MEDICINE

## 2022-04-23 ENCOUNTER — APPOINTMENT (OUTPATIENT)
Dept: CARDIOLOGY | Facility: HOSPITAL | Age: 77
End: 2022-04-23

## 2022-04-23 VITALS
DIASTOLIC BLOOD PRESSURE: 81 MMHG | WEIGHT: 165 LBS | RESPIRATION RATE: 16 BRPM | BODY MASS INDEX: 23.1 KG/M2 | HEIGHT: 71 IN | SYSTOLIC BLOOD PRESSURE: 142 MMHG | HEART RATE: 72 BPM | TEMPERATURE: 98.4 F | OXYGEN SATURATION: 100 %

## 2022-04-23 DIAGNOSIS — M79.604 RIGHT LEG PAIN: Primary | ICD-10-CM

## 2022-04-23 DIAGNOSIS — S82.841A CLOSED BIMALLEOLAR FRACTURE OF RIGHT ANKLE, INITIAL ENCOUNTER: ICD-10-CM

## 2022-04-23 DIAGNOSIS — E79.0 ELEVATED URIC ACID IN BLOOD: ICD-10-CM

## 2022-04-23 DIAGNOSIS — R79.89 POSITIVE D DIMER: ICD-10-CM

## 2022-04-23 DIAGNOSIS — N28.9 RENAL INSUFFICIENCY: ICD-10-CM

## 2022-04-23 LAB
ALBUMIN SERPL-MCNC: 4.1 G/DL (ref 3.5–5.2)
ALBUMIN/GLOB SERPL: 1.8 G/DL
ALP SERPL-CCNC: 54 U/L (ref 39–117)
ALT SERPL W P-5'-P-CCNC: 11 U/L (ref 1–41)
ANION GAP SERPL CALCULATED.3IONS-SCNC: 9 MMOL/L (ref 5–15)
AST SERPL-CCNC: 17 U/L (ref 1–40)
BASOPHILS # BLD AUTO: 0.03 10*3/MM3 (ref 0–0.2)
BASOPHILS NFR BLD AUTO: 0.5 % (ref 0–1.5)
BH CV LOWER VASCULAR LEFT COMMON FEMORAL AUGMENT: NORMAL
BH CV LOWER VASCULAR LEFT COMMON FEMORAL COMPRESS: NORMAL
BH CV LOWER VASCULAR LEFT COMMON FEMORAL PHASIC: NORMAL
BH CV LOWER VASCULAR LEFT COMMON FEMORAL SPONT: NORMAL
BH CV LOWER VASCULAR RIGHT COMMON FEMORAL AUGMENT: NORMAL
BH CV LOWER VASCULAR RIGHT COMMON FEMORAL COMPRESS: NORMAL
BH CV LOWER VASCULAR RIGHT COMMON FEMORAL PHASIC: NORMAL
BH CV LOWER VASCULAR RIGHT COMMON FEMORAL SPONT: NORMAL
BH CV LOWER VASCULAR RIGHT DISTAL FEMORAL COMPRESS: NORMAL
BH CV LOWER VASCULAR RIGHT GASTRONEMIUS COMPRESS: NORMAL
BH CV LOWER VASCULAR RIGHT GREATER SAPH AK COMPRESS: NORMAL
BH CV LOWER VASCULAR RIGHT GREATER SAPH BK COMPRESS: NORMAL
BH CV LOWER VASCULAR RIGHT LESSER SAPH COMPRESS: NORMAL
BH CV LOWER VASCULAR RIGHT MID FEMORAL AUGMENT: NORMAL
BH CV LOWER VASCULAR RIGHT MID FEMORAL COMPRESS: NORMAL
BH CV LOWER VASCULAR RIGHT MID FEMORAL PHASIC: NORMAL
BH CV LOWER VASCULAR RIGHT MID FEMORAL SPONT: NORMAL
BH CV LOWER VASCULAR RIGHT PERONEAL COMPRESS: NORMAL
BH CV LOWER VASCULAR RIGHT POPLITEAL AUGMENT: NORMAL
BH CV LOWER VASCULAR RIGHT POPLITEAL COMPRESS: NORMAL
BH CV LOWER VASCULAR RIGHT POPLITEAL PHASIC: NORMAL
BH CV LOWER VASCULAR RIGHT POPLITEAL SPONT: NORMAL
BH CV LOWER VASCULAR RIGHT POSTERIOR TIBIAL COMPRESS: NORMAL
BH CV LOWER VASCULAR RIGHT PROFUNDA FEMORAL COMPRESS: NORMAL
BH CV LOWER VASCULAR RIGHT PROXIMAL FEMORAL COMPRESS: NORMAL
BH CV LOWER VASCULAR RIGHT SAPHENOFEMORAL JUNCTION COMPRESS: NORMAL
BILIRUB SERPL-MCNC: 0.9 MG/DL (ref 0–1.2)
BUN SERPL-MCNC: 32 MG/DL (ref 8–23)
BUN/CREAT SERPL: 21.9 (ref 7–25)
CALCIUM SPEC-SCNC: 8.7 MG/DL (ref 8.6–10.5)
CHLORIDE SERPL-SCNC: 105 MMOL/L (ref 98–107)
CO2 SERPL-SCNC: 25 MMOL/L (ref 22–29)
CREAT SERPL-MCNC: 1.46 MG/DL (ref 0.76–1.27)
D DIMER PPP FEU-MCNC: 2.4 MCGFEU/ML (ref 0.01–0.5)
DEPRECATED RDW RBC AUTO: 51.3 FL (ref 37–54)
EGFRCR SERPLBLD CKD-EPI 2021: 49.2 ML/MIN/1.73
EOSINOPHIL # BLD AUTO: 0.01 10*3/MM3 (ref 0–0.4)
EOSINOPHIL NFR BLD AUTO: 0.2 % (ref 0.3–6.2)
ERYTHROCYTE [DISTWIDTH] IN BLOOD BY AUTOMATED COUNT: 14.3 % (ref 12.3–15.4)
ERYTHROCYTE [SEDIMENTATION RATE] IN BLOOD: 4 MM/HR (ref 0–20)
GLOBULIN UR ELPH-MCNC: 2.3 GM/DL
GLUCOSE SERPL-MCNC: 112 MG/DL (ref 65–99)
HBA1C MFR BLD: 4.9 % (ref 4.8–5.6)
HCT VFR BLD AUTO: 34.2 % (ref 37.5–51)
HGB BLD-MCNC: 11.5 G/DL (ref 13–17.7)
HOLD SPECIMEN: NORMAL
IMM GRANULOCYTES # BLD AUTO: 0.03 10*3/MM3 (ref 0–0.05)
IMM GRANULOCYTES NFR BLD AUTO: 0.5 % (ref 0–0.5)
LYMPHOCYTES # BLD AUTO: 0.86 10*3/MM3 (ref 0.7–3.1)
LYMPHOCYTES NFR BLD AUTO: 13.8 % (ref 19.6–45.3)
MAXIMAL PREDICTED HEART RATE: 143 BPM
MCH RBC QN AUTO: 32.8 PG (ref 26.6–33)
MCHC RBC AUTO-ENTMCNC: 33.6 G/DL (ref 31.5–35.7)
MCV RBC AUTO: 97.4 FL (ref 79–97)
MONOCYTES # BLD AUTO: 0.74 10*3/MM3 (ref 0.1–0.9)
MONOCYTES NFR BLD AUTO: 11.9 % (ref 5–12)
NEUTROPHILS NFR BLD AUTO: 4.56 10*3/MM3 (ref 1.7–7)
NEUTROPHILS NFR BLD AUTO: 73.1 % (ref 42.7–76)
NRBC BLD AUTO-RTO: 0 /100 WBC (ref 0–0.2)
PLATELET # BLD AUTO: 134 10*3/MM3 (ref 140–450)
PMV BLD AUTO: 10.5 FL (ref 6–12)
POTASSIUM SERPL-SCNC: 4.5 MMOL/L (ref 3.5–5.2)
PROCALCITONIN SERPL-MCNC: 0.06 NG/ML (ref 0–0.25)
PROT SERPL-MCNC: 6.4 G/DL (ref 6–8.5)
RBC # BLD AUTO: 3.51 10*6/MM3 (ref 4.14–5.8)
SODIUM SERPL-SCNC: 139 MMOL/L (ref 136–145)
STRESS TARGET HR: 122 BPM
URATE SERPL-MCNC: 9.2 MG/DL (ref 3.4–7)
URATE SERPL-MCNC: 9.4 MG/DL (ref 3.4–7)
WBC NRBC COR # BLD: 6.23 10*3/MM3 (ref 3.4–10.8)
WHOLE BLOOD HOLD SPECIMEN: NORMAL
WHOLE BLOOD HOLD SPECIMEN: NORMAL

## 2022-04-23 PROCEDURE — 73630 X-RAY EXAM OF FOOT: CPT

## 2022-04-23 PROCEDURE — 73610 X-RAY EXAM OF ANKLE: CPT

## 2022-04-23 PROCEDURE — 83036 HEMOGLOBIN GLYCOSYLATED A1C: CPT | Performed by: EMERGENCY MEDICINE

## 2022-04-23 PROCEDURE — 25010000002 DEXAMETHASONE PER 1 MG: Performed by: EMERGENCY MEDICINE

## 2022-04-23 PROCEDURE — 84145 PROCALCITONIN (PCT): CPT | Performed by: EMERGENCY MEDICINE

## 2022-04-23 PROCEDURE — 85379 FIBRIN DEGRADATION QUANT: CPT | Performed by: EMERGENCY MEDICINE

## 2022-04-23 PROCEDURE — 93971 EXTREMITY STUDY: CPT | Performed by: INTERNAL MEDICINE

## 2022-04-23 PROCEDURE — 85652 RBC SED RATE AUTOMATED: CPT | Performed by: EMERGENCY MEDICINE

## 2022-04-23 PROCEDURE — 85025 COMPLETE CBC W/AUTO DIFF WBC: CPT | Performed by: EMERGENCY MEDICINE

## 2022-04-23 PROCEDURE — 36415 COLL VENOUS BLD VENIPUNCTURE: CPT

## 2022-04-23 PROCEDURE — 25010000002 VANCOMYCIN 10 G RECONSTITUTED SOLUTION: Performed by: EMERGENCY MEDICINE

## 2022-04-23 PROCEDURE — 73590 X-RAY EXAM OF LOWER LEG: CPT

## 2022-04-23 PROCEDURE — 96365 THER/PROPH/DIAG IV INF INIT: CPT

## 2022-04-23 PROCEDURE — 96375 TX/PRO/DX INJ NEW DRUG ADDON: CPT

## 2022-04-23 PROCEDURE — 93971 EXTREMITY STUDY: CPT

## 2022-04-23 PROCEDURE — 25010000002 HYDROMORPHONE PER 4 MG: Performed by: EMERGENCY MEDICINE

## 2022-04-23 PROCEDURE — 99283 EMERGENCY DEPT VISIT LOW MDM: CPT

## 2022-04-23 PROCEDURE — 96367 TX/PROPH/DG ADDL SEQ IV INF: CPT

## 2022-04-23 PROCEDURE — 84550 ASSAY OF BLOOD/URIC ACID: CPT | Performed by: EMERGENCY MEDICINE

## 2022-04-23 PROCEDURE — 96366 THER/PROPH/DIAG IV INF ADDON: CPT

## 2022-04-23 PROCEDURE — 25010000002 CEFTRIAXONE PER 250 MG: Performed by: EMERGENCY MEDICINE

## 2022-04-23 PROCEDURE — 80053 COMPREHEN METABOLIC PANEL: CPT | Performed by: EMERGENCY MEDICINE

## 2022-04-23 RX ORDER — KETOROLAC TROMETHAMINE 15 MG/ML
7.5 INJECTION, SOLUTION INTRAMUSCULAR; INTRAVENOUS ONCE
Status: DISCONTINUED | OUTPATIENT
Start: 2022-04-23 | End: 2022-04-23

## 2022-04-23 RX ORDER — HYDROCODONE BITARTRATE AND ACETAMINOPHEN 5; 325 MG/1; MG/1
1 TABLET ORAL EVERY 6 HOURS PRN
Qty: 15 TABLET | Refills: 0 | Status: SHIPPED | OUTPATIENT
Start: 2022-04-23

## 2022-04-23 RX ORDER — HYDROMORPHONE HYDROCHLORIDE 1 MG/ML
0.5 INJECTION, SOLUTION INTRAMUSCULAR; INTRAVENOUS; SUBCUTANEOUS
Status: DISCONTINUED | OUTPATIENT
Start: 2022-04-23 | End: 2022-04-23 | Stop reason: HOSPADM

## 2022-04-23 RX ORDER — COLCHICINE 0.6 MG/1
0.6 TABLET ORAL ONCE
Status: COMPLETED | OUTPATIENT
Start: 2022-04-23 | End: 2022-04-23

## 2022-04-23 RX ORDER — SODIUM CHLORIDE 0.9 % (FLUSH) 0.9 %
10 SYRINGE (ML) INJECTION AS NEEDED
Status: DISCONTINUED | OUTPATIENT
Start: 2022-04-23 | End: 2022-04-23 | Stop reason: HOSPADM

## 2022-04-23 RX ORDER — DEXAMETHASONE SODIUM PHOSPHATE 4 MG/ML
6 INJECTION, SOLUTION INTRA-ARTICULAR; INTRALESIONAL; INTRAMUSCULAR; INTRAVENOUS; SOFT TISSUE ONCE
Status: COMPLETED | OUTPATIENT
Start: 2022-04-23 | End: 2022-04-23

## 2022-04-23 RX ADMIN — DEXAMETHASONE SODIUM PHOSPHATE 6 MG: 4 INJECTION, SOLUTION INTRAMUSCULAR; INTRAVENOUS at 15:11

## 2022-04-23 RX ADMIN — COLCHICINE 0.6 MG: 0.6 TABLET, FILM COATED ORAL at 16:28

## 2022-04-23 RX ADMIN — VANCOMYCIN HYDROCHLORIDE 1500 MG: 10 INJECTION, POWDER, LYOPHILIZED, FOR SOLUTION INTRAVENOUS at 16:27

## 2022-04-23 RX ADMIN — SODIUM CHLORIDE 1 G: 900 INJECTION INTRAVENOUS at 15:11

## 2022-04-23 RX ADMIN — HYDROMORPHONE HYDROCHLORIDE 0.5 MG: 1 INJECTION, SOLUTION INTRAMUSCULAR; INTRAVENOUS; SUBCUTANEOUS at 15:17

## 2022-04-23 NOTE — ED PROVIDER NOTES
"Subjective   This is a pleasant 76-year-old male he is generally active.  He still able to weed eat no weed eating really hurts his back.  He sees Dr. Munoz.    Comes emergency room today with pain in his right foot and ankle and difficulty walking.  He reports he was normal yesterday.  He did not do much sit around wait for a package from [employer].  He went to bed last night and woke up his right leg was really hurting and he had problems putting weight on it and he was pretty miserable with it.  It persisted and this morning got worse he was able to drop get dressed and actually get himself here but on arrival he said his right leg is just really killing him.  He was seen earlier at urgent treatment center and they were worried about a blood clot and sent him here.    He has no history of blood clots in the past.  He has no history of gout or arthritides though he does have chronic back issues.  He has not had fevers or chills.  He has had no runny nose, sore throat, or cough.  He has had no trauma to his right leg that he knows of.    Bowel movements and urine have been normal.  He is not passed blood per any orifice.        All other systems reviewed and are negative except as noted above.          Review of Systems   All other systems reviewed and are negative.      Past Medical History:   Diagnosis Date   • Arthritis    • Back pain    • Cancer (HCC)     pre skin cancerous areas - few    • Chronic kidney disease     \"weak kidneys\"   • Full dentures     full top and partial at bottom    • High cholesterol    • [place] (hard of hearing)     bilat hearing aids    • Hypertension    • Psoriasis    • Wears glasses    • Wears hearing aid     bilat        Allergies   Allergen Reactions   • Penicillins Hives       Past Surgical History:   Procedure Laterality Date   • HERNIA REPAIR      umbi;lical hernia - with mesh    • LUMBAR LAMINECTOMY DISCECTOMY DECOMPRESSION N/A 10/18/2021    Procedure: LUMBAR LAMINECTOMY  L3-5;  " Surgeon: Kenan Arevalo MD;  [facility];  Service: Neurosurgery;  Laterality: N/A;       Family History   Family history unknown: Yes       Social History     Socioeconomic History   • Marital status:    Tobacco Use   • Smoking status: Former Smoker     Packs/day: 1.00     Types: Cigarettes     Quit date: 2019     Years since quitting: 3.3   • Smokeless tobacco: Never Used   • Tobacco comment: smoked on and off    Vaping Use   • Vaping Use: Never used   Substance and Sexual Activity   • Alcohol use: Yes     Alcohol/week: 7.0 standard drinks     Types: 7 Shots of liquor per week     Comment: bourbon   • Drug use: Never   • Sexual activity: Defer           Objective   Physical Exam  Vitals and nursing note reviewed.   Constitutional:       Appearance: He is normal weight.      Comments: Is a pleasant 76-year-old male alert and oriented GCS 15.   HENT:      Head: Normocephalic and atraumatic.      Right Ear: External ear normal.      Left Ear: External ear normal.      Nose: Nose normal.      Mouth/Throat:      Mouth: Mucous membranes are moist.      Pharynx: Oropharynx is clear.   Eyes:      Extraocular Movements: Extraocular movements intact.      Conjunctiva/sclera: Conjunctivae normal.      Pupils: Pupils are equal, round, and reactive to light.   Cardiovascular:      Rate and Rhythm: Normal rate and regular rhythm.      Pulses: Normal pulses.      Heart sounds: Normal heart sounds.   Pulmonary:      Effort: Pulmonary effort is normal.      Breath sounds: Normal breath sounds.   Abdominal:      General: Abdomen is flat. Bowel sounds are normal. There is no distension.      Palpations: Abdomen is soft. There is no mass.   Musculoskeletal:      Cervical back: Normal range of motion and neck supple.      Comments: Upper extremities are normal with good pulses no edema synovitis or rash.  Left lower extremity is unremarkable is got some venous stasis changes he has served 4 pulses in his left foot  is not tender.    On the right side his right groin is without mass.  Good palpable pulse in the right femoral area.  Right thigh and right knee are unremarkable.  About a third of the weight up his right tib-fib he starts to have swelling that persist in the ankle and the foot.  He is extremely tender in the right ankle.  His edema there almost cellulitic like extending up into his foreleg.  His toes are nontender.  He has 3/4 pulses in his right foot looks well perfused.  I do not feel any venous cords.   Skin:     General: Skin is warm and dry.      Capillary Refill: Capillary refill takes less than 2 seconds.   Neurological:      Mental Status: He is alert.      Comments: Face symmetric voice strong tongue midline.  Vision, hearing, and speech are preserved.  No focal weakness that just really hurts to move his right ankle.         FX Dislocation    Date/Time: 4/23/2022 9:33 PM  Performed by: Rio Arora MD  Authorized by: Rio Arora MD     Consent:     Consent obtained:  Verbal  Comments:      Gentleman has a bimalleolar fracture is right ankle needed splint placement.    After pain medicine gentle traction was placed on the ankle.  Soft roll was then placed to pad the leg from the toes to the knee.    A 5 inch wide fiber from splint was placed posteriorly and held in place by additional soft roll.  A U-shaped splint was then placed and was then held in place with additional soft roll and elastic bandage.    Post procedure the patient's right foot had a good pulse good capillary refill in the toes and sensation to touch was intact.  No immediate complications.  Post procedure x-rays shows the bimalleolar fracture unchanged.               ED Course  ED Course as of 04/23/22 2136   Sat Apr 23, 2022   1618 This is [facility] in the vascular lab. The preliminary for the right lower extremity venous duplex is negative for DVT and SVT.  [ER]      ED Course User Index  [ER] Rio Arora MD                  Recent Results (from the past 24 hour(s))   Green Top (Gel)    Collection Time: 04/23/22  2:12 PM   Result Value Ref Range    Extra Tube Hold for add-ons.    Lavender Top    Collection Time: 04/23/22  2:12 PM   Result Value Ref Range    Extra Tube hold for add-on    Gold Top - SST    Collection Time: 04/23/22  2:12 PM   Result Value Ref Range    Extra Tube Hold for add-ons.    Gray Top    Collection Time: 04/23/22  2:12 PM   Result Value Ref Range    Extra Tube Hold for add-ons.    Light Blue Top    Collection Time: 04/23/22  2:12 PM   Result Value Ref Range    Extra Tube hold for add-on    Comprehensive Metabolic Panel    Collection Time: 04/23/22  2:12 PM    Specimen: Blood   Result Value Ref Range    Glucose 112 (H) 65 - 99 mg/dL    BUN 32 (H) 8 - 23 mg/dL    Creatinine 1.46 (H) 0.76 - 1.27 mg/dL    Sodium 139 136 - 145 mmol/L    Potassium 4.5 3.5 - 5.2 mmol/L    Chloride 105 98 - 107 mmol/L    CO2 25.0 22.0 - 29.0 mmol/L    Calcium 8.7 8.6 - 10.5 mg/dL    Total Protein 6.4 6.0 - 8.5 g/dL    Albumin 4.10 3.50 - 5.20 g/dL    ALT (SGPT) 11 1 - 41 U/L    AST (SGOT) 17 1 - 40 U/L    Alkaline Phosphatase 54 39 - 117 U/L    Total Bilirubin 0.9 0.0 - 1.2 mg/dL    Globulin 2.3 gm/dL    A/G Ratio 1.8 g/dL    BUN/Creatinine Ratio 21.9 7.0 - 25.0    Anion Gap 9.0 5.0 - 15.0 mmol/L    eGFR 49.2 (L) >60.0 mL/min/1.73   D-dimer, Quantitative    Collection Time: 04/23/22  2:12 PM    Specimen: Blood   Result Value Ref Range    D-Dimer, Quantitative 2.40 (H) 0.01 - 0.50 MCGFEU/mL   CBC Auto Differential    Collection Time: 04/23/22  2:12 PM    Specimen: Blood   Result Value Ref Range    WBC 6.23 3.40 - 10.80 10*3/mm3    RBC 3.51 (L) 4.14 - 5.80 10*6/mm3    Hemoglobin 11.5 (L) 13.0 - 17.7 g/dL    Hematocrit 34.2 (L) 37.5 - 51.0 %    MCV 97.4 (H) 79.0 - 97.0 fL    MCH 32.8 26.6 - 33.0 pg    MCHC 33.6 31.5 - 35.7 g/dL    RDW 14.3 12.3 - 15.4 %    RDW-SD 51.3 37.0 - 54.0 fl    MPV 10.5 6.0 - 12.0 fL    Platelets 134 (L) 140 - 450  10*3/mm3    Neutrophil % 73.1 42.7 - 76.0 %    Lymphocyte % 13.8 (L) 19.6 - 45.3 %    Monocyte % 11.9 5.0 - 12.0 %    Eosinophil % 0.2 (L) 0.3 - 6.2 %    Basophil % 0.5 0.0 - 1.5 %    Immature Grans % 0.5 0.0 - 0.5 %    Neutrophils, Absolute 4.56 1.70 - 7.00 10*3/mm3    Lymphocytes, Absolute 0.86 0.70 - 3.10 10*3/mm3    Monocytes, Absolute 0.74 0.10 - 0.90 10*3/mm3    Eosinophils, Absolute 0.01 0.00 - 0.40 10*3/mm3    Basophils, Absolute 0.03 0.00 - 0.20 10*3/mm3    Immature Grans, Absolute 0.03 0.00 - 0.05 10*3/mm3    nRBC 0.0 0.0 - 0.2 /100 WBC   Procalcitonin    Collection Time: 04/23/22  2:12 PM    Specimen: Blood   Result Value Ref Range    Procalcitonin 0.06 0.00 - 0.25 ng/mL   Sedimentation Rate    Collection Time: 04/23/22  2:12 PM    Specimen: Blood   Result Value Ref Range    Sed Rate 4 0 - 20 mm/hr   Uric Acid    Collection Time: 04/23/22  2:12 PM    Specimen: Blood   Result Value Ref Range    Uric Acid 9.4 (H) 3.4 - 7.0 mg/dL   Hemoglobin A1c    Collection Time: 04/23/22  2:12 PM    Specimen: Blood   Result Value Ref Range    Hemoglobin A1C 4.90 4.80 - 5.60 %   Uric Acid    Collection Time: 04/23/22  3:09 PM    Specimen: Blood   Result Value Ref Range    Uric Acid 9.2 (H) 3.4 - 7.0 mg/dL   Duplex Venous Lower Extremity - Right CAR    Collection Time: 04/23/22  4:12 PM   Result Value Ref Range    Target HR (85%) 122 bpm    Max. Pred. HR (100%) 143 bpm    Right Common Femoral Spont Y     Right Common Femoral Phasic Y     Right Common Femoral Augment Y     Right Common Femoral Compress C     Right Saphenofemoral Junction Compress C     Right Profunda Femoral Compress C     Right Proximal Femoral Compress C     Right Mid Femoral Spont Y     Right Mid Femoral Phasic Y     Right Mid Femoral Augment Y     Right Mid Femoral Compress C     Right Distal Femoral Compress C     Right Popliteal Spont Y     Right Popliteal Phasic Y     Right Popliteal Augment Y     Right Popliteal Compress C     Right Posterior Tibial  Compress C     Right Peroneal Compress C     Right Gastronemius Compress C     Right Greater Saph AK Compress C     Right Greater Saph BK Compress C     Right Lesser Saph Compress C     Left Common Femoral Spont Y     Left Common Femoral Phasic Y     Left Common Femoral Augment Y     Left Common Femoral Compress C      Note: In addition to lab results from this visit, the labs listed above may include labs taken at another facility or during a different encounter within the last 24 hours. Please correlate lab times with ED admission and discharge times for further clarification of the services performed during this visit.    XR Ankle 3+ View Right   Final Result   1.  Bimalleolar ankle fractures are again noted. A cast is present   obscuring the bony detail to some degree.       This report was finalized on 4/23/2022 5:02 PM by Dionicio Altamirano MD.          XR Tibia Fibula 2 View Right   Final Result   1.  There is slight irregularity to the head of the first metatarsal   best seen on the oblique view of the foot. This may be degenerative in   nature although a subtle fracture cannot be excluded in the proper   clinical setting.   2.  There are fractures of the distal fibula and medial malleolar area.       This report was finalized on 4/23/2022 3:06 PM by Dionicio Altamirano MD.          XR Foot 3+ View Right   Final Result   1.  There is slight irregularity to the head of the first metatarsal   best seen on the oblique view of the foot. This may be degenerative in   nature although a subtle fracture cannot be excluded in the proper   clinical setting.   2.  There are fractures of the distal fibula and medial malleolar area.       This report was finalized on 4/23/2022 3:06 PM by Dionicio Altamirano MD.            Vitals:    04/23/22 1429 04/23/22 1432 04/23/22 1517 04/23/22 1851   BP:    142/81   BP Location:       Patient Position:       Pulse:  72     Resp:       Temp:       TempSrc:       SpO2: 97%  100%    Weight:       Height:          Medications   dexamethasone (DECADRON) injection 6 mg (6 mg Intravenous Given 4/23/22 1511)   vancomycin 1500 mg/500 mL 0.9% NS IVPB ([facility]) (0 mg/kg × 74.8 kg Intravenous Stopped 4/23/22 1829)   cefTRIAXone (ROCEPHIN) 1 g/100 mL 0.9% NS (MBP) (0 g Intravenous Stopped 4/23/22 1541)   colchicine tablet 0.6 mg (0.6 mg Oral Given 4/23/22 1628)     ECG/EMG Results (last 24 hours)     ** No results found for the last 24 hours. **        No orders to display                          LINDA reviewed by Rio Arora MD             MDM  Number of Diagnoses or Management Options  Closed bimalleolar fracture of right ankle, initial encounter  Elevated uric acid in blood  Positive D dimer  Renal insufficiency  Right leg pain  Diagnosis management comments:       I reviewed all available studies at the bedside with the patient.  This is a very strange case as the patient cannot remember any trauma to his right ankle or foot.    Initially I thought he might have cellulitis and I started the patient on antibiotics and his uric acid came back and certainly gout was in the differential and I gave him Decadron for this.  His D-dimer was elevated however thankfully a venous duplex of the right leg showed no evidence of clot.  X-rays were obtained which did not show the fracture.  Perhaps the patient has some neuropathy in his foot and ankle due to his previous back issues.    Patient's pain was controlled.  I spoke with Dr. Ballard on-call orthopedic surgery who recommended splinting and crutches and aggressive elevation of this.  The patient thought he could go home as he has help around the house.    Dr. Ballard has arranged follow-up with foot and ankle specialist Dr. Rouse the patient is to call the office Monday to get an appointment to be seen on Monday.    LAURA Rios reviewed pain medicine prescribed.  Patient needs avoid nonsteroidals due to renal insufficiency.    I have also encouraged follow-up with his PCP to  discuss his other lab abnormalities.    He will return to the ED if worse in any way.    He has crutches at home and feels comfortable using them.    Are agreeable with the plan       Amount and/or Complexity of Data Reviewed  Clinical lab tests: reviewed  Tests in the radiology section of CPT®: reviewed  Decide to obtain previous medical records or to obtain history from someone other than the patient: yes        Final diagnoses:   Right leg pain   Closed bimalleolar fracture of right ankle, initial encounter   Elevated uric acid in blood   Positive D dimer   Renal insufficiency       ED Disposition  ED Disposition     ED Disposition   Discharge    Condition   Stable    Comment   --             Shannan Rivera MD  [address]  [address]  [place] [address]  [phone]    Schedule an appointment as soon as possible for a visit       Manan Rouse Jr., MD  [address]  [place] [id number] [phone]    Schedule an appointment as soon as possible for a visit   Call the office Monday to be seen on Monday by Dr. Rouse to follow-up on your ankle to get scheduled for surgery         Medication List      New Prescriptions    HYDROcodone-acetaminophen 5-325 MG per tablet  Commonly known as: NORCO  Take 1 tablet by mouth Every 6 (Six) Hours As Needed for Moderate Pain  for up to 16 doses.           Where to Get Your Medications      These medications were sent to [facility]/pharmacy #[id number] - [place], [place] - [address] - [phone] [facility] [phone]   [address] [place] [address]    Phone: [phone]   · HYDROcodone-acetaminophen 5-325 MG per tablet          Rio Arora MD  04/23/22 1848

## 2022-04-23 NOTE — DISCHARGE INSTRUCTIONS
Elevate your leg is much as possible.  If it hangs down it will swell and cause you more problems.    Do not bear weight on the leg.  Do not get the splint wet.

## 2022-06-10 ENCOUNTER — OFFICE VISIT (OUTPATIENT)
Dept: NEUROSURGERY | Facility: CLINIC | Age: 77
End: 2022-06-10

## 2022-06-10 VITALS
WEIGHT: 154.6 LBS | HEIGHT: 71 IN | TEMPERATURE: 97.9 F | SYSTOLIC BLOOD PRESSURE: 102 MMHG | DIASTOLIC BLOOD PRESSURE: 60 MMHG | BODY MASS INDEX: 21.65 KG/M2

## 2022-06-10 DIAGNOSIS — M48.062 LUMBAR STENOSIS WITH NEUROGENIC CLAUDICATION: Primary | ICD-10-CM

## 2022-06-10 PROCEDURE — 99213 OFFICE O/P EST LOW 20 MIN: CPT | Performed by: PHYSICIAN ASSISTANT

## 2022-06-10 RX ORDER — APIXABAN 2.5 MG/1
2.5 TABLET, FILM COATED ORAL 2 TIMES DAILY
COMMUNITY
Start: 2022-06-02

## 2022-06-10 NOTE — PROGRESS NOTES
"Patient: Chris Patel  : 1945  Gender: male    Primary Care Provider: Shannan Rivera MD    Requesting Provider:  No referring provider defined for this encounter.     Chief Complaint: Low back pain    History of Present Illness:  Mr. Patel is a 76-year-old gentleman who is known to Dr. Arevalo service for undergoing decompressive laminectomies L3-4 and L4-5 on 10/18/2021.  We have followed him postoperatively for ongoing mechanical low back pain.  Today he denies any significant worsening symptoms.  Unfortunately he injured his right ankle that required surgery a couple of months ago.  He is nonweightbearing and in a boot.  He was only able to attend a few PT visits for his back until this occurred.      Past Medical and Surgical History:  Past Medical History:   Diagnosis Date   • Arthritis    • Back pain    • Cancer (HCC)     pre skin cancerous areas - few    • Chronic kidney disease     \"weak kidneys\"   • Full dentures     full top and partial at bottom    • High cholesterol    • [place] (hard of hearing)     bilat hearing aids    • Hypertension    • Psoriasis    • Wears glasses    • Wears hearing aid     bilat      Past Surgical History:   Procedure Laterality Date   • HERNIA REPAIR      umbi;lical hernia - with mesh    • LUMBAR LAMINECTOMY DISCECTOMY DECOMPRESSION N/A 10/18/2021    Procedure: LUMBAR LAMINECTOMY  L3-5;  Surgeon: Kenan Arevalo MD;  Location: [facility];  Service: Neurosurgery;  Laterality: N/A;       Current Medications:    Current Outpatient Medications:   •  atorvastatin (LIPITOR) 40 MG tablet, Take 40 mg by mouth every night at bedtime., Disp: , Rfl:   •  Eliquis 2.5 MG tablet tablet, Take 2.5 mg by mouth 2 (Two) Times a Day., Disp: , Rfl:   •  gabapentin (NEURONTIN) 300 MG capsule, Take 600 mg by mouth Every Night., Disp: , Rfl:   •  HYDROcodone-acetaminophen (NORCO) 5-325 MG per tablet, Take 1 tablet by mouth Every 6 (Six) Hours As Needed for Moderate Pain  for up to 16 doses., " "Disp: 15 tablet, Rfl: 0  •  lisinopril (PRINIVIL,ZESTRIL) 40 MG tablet, Take 40 mg by mouth Daily., Disp: , Rfl:   •  Risankizumab-rzaa (SKYRIZI, 150 MG DOSE, SC), Inject 75 mg under the skin into the appropriate area as directed. Every 3 months-   Ld 8th, Disp: , Rfl:   •  traZODone (DESYREL) 50 MG tablet, Take 50 mg by mouth Daily., Disp: , Rfl:     Allergies:  Allergies   Allergen Reactions   • Penicillins Hives         Review of Systems   All other systems reviewed and are negative.        Physical Exam  Constitutional:       Appearance: Normal appearance.   HENT:      Head: Normocephalic and atraumatic.   Musculoskeletal:         General: Normal range of motion.      Cervical back: Normal range of motion and neck supple.   Right ankle boot noted.  Patient is in a wheelchair today.  Skin:     General: Skin is warm and dry.   Neurological:      Mental Status: He is alert and oriented to person, place, and time.      Sensory: Sensation is intact.      Motor: Motor function is intact.      Coordination: Coordination is intact.      Gait: Gait is intact.   Psychiatric:         Mood and Affect: Mood normal.         Behavior: Behavior normal.     Vitals:    06/10/22 0843   BP: 102/60   BP Location: Right arm   Patient Position: Sitting   Cuff Size: Adult   Temp: 97.9 °F (36.6 °C)   TempSrc: Infrared   Weight: 70.1 kg (154 lb 9.6 oz)   Height: 179.1 cm (70.5\")       BMI is within normal parameters. No other follow-up for BMI required.    Independent Review of Diagnostic Imaging:  No new imaging    Assessment:  1.  Lumbar stenosis with claudication status post decompressive laminectomies  2. Mechanical low back pain    Plan:  Mr. Patel is seen today in follow-up.  Unfortunately in the interim of last visit he injured his right ankle requiring surgery and he is now nonweightbearing.  He has yet to do a full round of physical therapy on his low back.  He is going to get over his ankle injury and therapy for that in the " coming weeks.  He will contact our clinic if he develops any worsening symptoms related to his back moving forward.  Otherwise follow-up as needed.        Ernestine Becerra PA-C

## 2023-08-03 NOTE — PROGRESS NOTES
"Chief Complaint: \"Lower back pain\"      History of Present Illness:   Patient: Mr. Chris Patel, 77 y.o. male   Referring Physician: Dr. Herminio lCement  Reason for Referral: Consultation for chronic intractable lower back pain.   Pain History: Patient reports a longstanding history of chronic intractable lower back pain. Chris Patel is an established patient who was last seen in September 2021 for diagnostic and therapeutic bilateral L3-L4 transforaminal epidural steroid injections.  Subsequently, on 10/18/2021, he underwent lumbar laminectomies with medial facetectomies and foraminotomies at L3-L4 and L4-L5 by Dr. Kenan Arevalo.  He was seen in follow-up by Ernestine Becerra PA-C on 06/10/2022, when patient was still complaining of residual symptoms. At that time, he was advised to follow-up with the neurosurgical team on an as-needed basis.  Patient did not return to the clinic for follow-up. Now, he has not been referred by Teresa Coreas PA-C for Dr. Herminio Clement with [facility] with complaints of persistent lower back pain after his surgery.  He reports numbness from his knees down into his feet bilaterally.  He reports significant intolerance to standing or walking. Symptoms decrease by sitting or lying down. He has balance difficulties and has had several falls.  He denies symptoms of cauda equina.  MRI of the lumbar spine revealed at L5-S1 Modic endplate changes.  Laminectomy defects at L3-L4 and L4-L5. At L2-L3 there is facet hypertrophy and loss of disc height without significant stenosis.  Flexion and extension x-rays of the lumbar spine reveal no obvious instability. Chris Patel has been referred in consultation for diagnostic lumbar medial branch blocks and possible RFA and if beneficial, then Dr Clement would consider L3-S1 fusion. Chris Patel has significant component of peripheral neuropathy given the numbness from his knees down that does not change with " positions. Patient is no longer smoking.  He still drinks alcohol. Pain has progressed in intensity over the past years. Patient has failed to obtain pain relief with surgical measures ans well as conservative measures including oral analgesics, opioids, topical analgesics, ice, heat, physical therapy (last visit within the past 18 months), physical therapist directed home exercise program HEP (ongoing), to name a few  Pain Description: Constant lower back pain with intermittent exacerbation, described as aching, burning, crushing, sharp, stabbing, and throbbing sensation.   Radiation of Pain: The pain does not radiate radiate. He complains of numbness and weakness in his legs  Pain intensity today: 0/10   Average pain intensity last week: 6/10  Pain intensity ranges from: 0/10 to 9/10  Aggravating factors: Pain increases with standing and walking. Patient describes neurogenic claudication. Patient does not use a cane or a walker  Alleviating factors: Pain decreases with sitting down, lying down  Associated Symptoms:   Patient reports numbness and weakness in the lower extremities.   Patient denies any new bladder or bowel problems.   Patient reports difficulties with his balance and reports some recent falls.   Pain interferes with ADLs, general activities (ability to walk, stand, transition from different positions), and affects patient's quality of life  Pain does not interfere with sleep     Review of previous therapies and additional medical records:  Chris Patel has already failed the following measures, including:   Conservative Measures: Oral analgesics, opioids, topical analgesics, ice, heat, physical therapy (last visit within the past 18 months), physical therapist directed home exercise program HEP (ongoing)  Interventional Measures: Prior to lumbar surgery:  09/2021: Therapeutic bilateral L3-L4 transforaminal epidural steroid injections    07/19/2021: DxTx bilateral L3-L4 transforaminal epidural  steroid injection   Surgical Measures:  10/18/2021: lumbar laminectomies with medial facetectomies and foraminotomies at L3-L4 and L4-L5 by Dr. Kenan Arevalo.    Chris Patel underwent spine neurosurgical consultation with Teresa Coreas PA-C for Dr. Herminio Clement with [facility] and was found to be a potential surgical candidate for PLIF L3-S1.  Chris Patel presents with significant comorbidities including osteoarthritis, chronic kidney disease, hyperlipidemia, hearing loss, hypertension, psoriasis, tobacco abuse  In terms of current analgesics, Chris Patel takes: Gabapentin 300 mg BID. Patient also takes trazodone  I have reviewed Gabriel Report consistent with medication reconciliation.  SOAPP: Not completed by the patient      PHQ-2 Depression Screening  Little interest or pleasure in doing things? 0-->not at all   Feeling down, depressed, or hopeless? 0-->not at all   PHQ-2 Total Score 0       Pain Self-Efficacy Questionnaire (PSEQ)  ITEM 08-08 2023        I can enjoy things despite the pain. 0        I can do most of the household chores (tidying up, washing dishes, etc), despite the pain. 6        I can socialize with my friends or family members as often as I used to do, despite the pain. 6        I can cope with my pain in most situations. 6        I can do some form of work, despite the pain (includes housework, paid, and unpaid work). 6        I can still do many of the things I enjoy doing, such as hobbies or leisure activity despite pain. 6        I can cope with my pain without medications. 6        I can accomplish most of my goals in life despite the pain. 6        I can live in a normal lifestyle, despite the pain. 6        I can gradually become more active, despite the pain. 5        TOTAL SCORE 53/60            Global Pain Scale 08-08 2023          Pain 20          Feelings 6          Clinical outcomes 12          Activities 20          GPS Total: 58             The [place] Back Pain Disability Scale   DATE 08-08 2023          Sleep through the night 3          Turn over in bed 0          Get out of bed 5          Make your bed 0          Put on socks (pantyhose) 1          Ride in a car 0          Sit in a chair for several hours 2          Stand up for 20-30 minutes 2          Climb one flight of stairs 2          Walk a few blocks (200-300 yards)  5          Walk several miles 5          Run one block (about 50 yards) 5          Take food out of the refrigerator 0          Reach up to high shelves 0          Move a chair 1          Pull or push heavy doors 1          Bend over to clean the bathtub 3          Throw a ball 0          Carry two bags of groceries 3          Lift and carry a heavy suitcase 5          Total score 43              Review of Diagnostic Studies:  I have independently reviewed and interpreted the images with the patient and used the images and a tridimensional spine model to explain findings. I have also reviewed the reports.  MRI of the cervical spine without contrast on 07/13/2023 revealed multilevel cervical spondylosis.  Axial imaging:  C2-C3: Facet hypertrophy, disc osteophyte complex.  Mild canal stenosis.  C3-C4: Facet hypertrophy, disc osteophyte complex.  Mild canal stenosis.  Moderate bilateral foraminal stenosis  C4-C5: Facet hypertrophy, disc osteophyte complex.  Mild canal stenosis.  Moderate to severe bilateral foraminal stenosis  C5-C6: Disc osteophyte complex, facet hypertrophy.  Mild canal stenosis.  Severe bilateral foraminal stenosis  C6-C7: Facet hypertrophy, disc osteophyte complex.  Mild canal stenosis.  Moderate bilateral foraminal stenosis  MRI of the lumbar spine without contrast on 04/20/2023. Report. Patient did not bring the disc.Mild lumbar levoscoliosis.  Multilevel spondylosis with degenerative disc disease and facet hypertrophy at all lumbar levels.  Axial imaging:  T11-T12, T12-L1, L1-L2: No significant  "canal or foraminal stenosis  L2-L3: Slight retrolisthesis, disc bulge, facet hypertrophy.  Mild canal stenosis and mild foraminal stenosis  L3-L4: Evidence of previous laminectomy.  Disc bulge.  Facet hypertrophy.  No significant canal stenosis.  Mild foraminal stenosis  L4-L5: Evidence of previous laminectomy.  Disc bulge.  Facet hypertrophy.  No significant canal stenosis.  Mild foraminal stenosis   L5-S1: Disc bulge, facet hypertrophy.  No significant canal stenosis.  Mild left foraminal stenosis    The following portions of the patient's history were reviewed and updated as appropriate: problem list, past medical history, past surgery history, social history, family history, medication reconciliation, and allergies    Review of Systems   HENT:  Positive for hearing loss.    Musculoskeletal:  Positive for back pain.   Neurological:  Positive for numbness.   All other systems reviewed and are negative.      Patient Active Problem List   Diagnosis    Lumbar stenosis with neurogenic claudication    Spondylosis of lumbar region without myelopathy or radiculopathy    Degeneration of lumbar or lumbosacral intervertebral disc    Physical deconditioning    Gait disturbance    FRANCISCO JAVIER (acute kidney injury)    Bilateral low back pain without sciatica    Body mass index (BMI) of 20 to 24    Cigarette smoker    Decreased GFR    Mixed hyperlipidemia    Hypertension, benign    Screening for depression    Lumbar postlaminectomy syndrome       Past Medical History:   Diagnosis Date    Arthritis     Back pain     Cancer     pre skin cancerous areas - few     Chronic kidney disease     \"weak kidneys\"    Full dentures     full top and partial at bottom     High cholesterol     [place] (hard of hearing)     bilat hearing aids     Hypertension     Psoriasis     Wears glasses     Wears hearing aid     bilat          Past Surgical History:   Procedure Laterality Date    HERNIA REPAIR      umbi;lical hernia - with mesh     LUMBAR LAMINECTOMY " "DISCECTOMY DECOMPRESSION N/A 10/18/2021    Procedure: LUMBAR LAMINECTOMY  L3-5;  Surgeon: Kenan Arevalo MD;  Location: [facility];  Service: Neurosurgery;  Laterality: N/A;         Family History   Family history unknown: Yes         Social History     Socioeconomic History    Marital status:    Tobacco Use    Smoking status: Former     Packs/day: 1.00     Types: Cigarettes     Quit date:      Years since quittin.6    Smokeless tobacco: Never    Tobacco comments:     smoked on and off    Vaping Use    Vaping Use: Never used   Substance and Sexual Activity    Alcohol use: Yes     Alcohol/week: 7.0 standard drinks     Types: 7 Shots of liquor per week     Comment: bourbon    Drug use: Never    Sexual activity: Defer           Current Outpatient Medications:     atorvastatin (LIPITOR) 40 MG tablet, Take 1 tablet by mouth every night at bedtime., Disp: , Rfl:     gabapentin (NEURONTIN) 300 MG capsule, Take 2 capsules by mouth Every Night., Disp: , Rfl:     HYDROcodone-acetaminophen (NORCO) 5-325 MG per tablet, Take 1 tablet by mouth Every 6 (Six) Hours As Needed for Moderate Pain  for up to 16 doses., Disp: 15 tablet, Rfl: 0    lisinopril (PRINIVIL,ZESTRIL) 40 MG tablet, Take 1 tablet by mouth Daily., Disp: , Rfl:     Risankizumab-rzaa (SKYRIZI, 150 MG DOSE, SC), Inject 75 mg under the skin into the appropriate area as directed. Every 3 months-   Ld 8th, Disp: , Rfl:     traZODone (DESYREL) 100 MG tablet, Take 1 tablet by mouth Daily., Disp: , Rfl:       Allergies   Allergen Reactions    Penicillins Hives         /68 (BP Location: Left arm, Patient Position: Sitting, Cuff Size: Adult)   Temp 98.4 øF (36.9 øC) (Infrared)   Ht 177.8 cm (70\")   Wt 76.2 kg (168 lb)   BMI 24.11 kg/mý       Physical Exam:  Constitutional: Patient appears well-developed, well-nourished, well-hydrated, appears younger than stated age  HEENT: Head: Normocephalic and atraumatic  Eyes: Conjunctivae and lids are " normal  Pupils: Equal, round, reactive to light  Peripheral vascular exam: Posterior tibialis: right 2+ and left 2+. Dorsalis pedis: right 2+ and left 2+. 1+ edema.   Musculoskeletal   Gait and station: Gait evaluation demonstrated shuffling, wide base. Unable to walk on heels, toes, tandem walking   Lumbar spine: Passive and active range of motion are limited secondary to pain. Extension of the lumbar spine increased and reproduced pain. Lumbar facet joint loading maneuvers are positive.  Thomas test and Gaenslen's test are negative   Piriformis maneuvers are negative   Palpation of the bilateral ischial tuberosities, unrevealing   Hip joints: The range of motion of the hip joints is almost full and without pain   Palpation of the bilateral greater trochanter, unrevealing   Examination of the Iliotibial band: unrevealing   Neurological:   Patient is alert and oriented to person, place, and time.   Speech: Normal.   Cortical function: Normal mental status.   Reflex Scores:  Right patellar: 1+  Left patellar: 1+  Right Achilles: 0+  Left Achilles: 0+  Motor strength: 5/5  Motor Tone: Normal  Involuntary movements: None.   Superficial/Primitive Reflexes: Primitive reflexes were absent.   Right La: Absent  Left La: Absent  Right ankle clonus: Absent  Left ankle clonus: Absent   Babinsky:  Absent  Long tract signs: Negative. Straight leg raising test: Negative. Femoral stretch sign: Negative.   Sensory exam: Intact to light touch, intact pain and temperature sensation, intact vibration sensation and normal proprioception, except for decreased vibration in a stocking distribution from his knees down.   Coordination: Normal finger to nose. Romberg's sign: Negative    Skin and subcutaneous tissue: Skin is warm and intact. No rash noted. No cyanosis.   Psychiatric: Judgment and insight: Normal. Recent and remote memory: Intact. Mood and affect: Normal.       ASSESSMENT:   1. Spondylosis of lumbar region without  myelopathy or radiculopathy    2. Degeneration of lumbar or lumbosacral intervertebral disc    3. Lumbar postlaminectomy syndrome    4. Lumbar stenosis with neurogenic claudication    5. Gait disturbance    6. Physical deconditioning        PLAN/MEDICAL DECISION MAKING:  Mr. Chris Patel, 77 y.o. male presents with a longstanding history of chronic intractable lower back pain. Chris Patel is an established patient who was last seen in September 2021 for diagnostic and therapeutic bilateral L3-L4 transforaminal epidural steroid injections.  Subsequently, on 10/18/2021, he underwent lumbar laminectomies with medial facetectomies and foraminotomies at L3-L4 and L4-L5 by Dr. Kenan Arevalo.  He was seen in follow-up by Ernestine Becerra PA-C on 06/10/2022, when patient was still complaining of residual symptoms. At that time, he was advised to follow-up with the neurosurgical team on an as-needed basis.  Patient did not return to the clinic for follow-up. Now, he has not been referred by Teresa Coreas PA-C for Dr. Herminio Clement with [facility] in [facility] with complaints of persistent lower back pain after his surgery. He reports numbness from his knees down into his feet bilaterally in a stocking distribution.  He reports significant intolerance to standing and walking. Symptoms decrease by sitting or lying down. He has balance difficulties and has had several falls.  He denies symptoms of cauda equina.  MRI of the lumbar spine revealed at L5-S1 Modic endplate changes.  Laminectomy defects at L3-L4 and L4-L5. At L2-L3 there is facet hypertrophy and loss of disc height without significant stenosis.  Flexion and extension x-rays of the lumbar spine reveal no obvious instability. Chris Patel has been referred in consultation for diagnostic lumbar medial branch blocks and possible RFA and if beneficial, then Dr Clement would consider L3-S1 fusion. Chris Patel has significant component of  peripheral neuropathy given the numbness from his knees down that does not change with positions. Patient is no longer smoking.  He still drinks alcohol. Pain has progressed in intensity over the past years. Patient has failed to obtain pain relief with surgical measures ans well as conservative measures including oral analgesics, opioids, topical analgesics, ice, heat, physical therapy (last visit within the past 18 months), physical therapist directed home exercise program HEP (ongoing), to name a few. A comprehensive evaluation including history and physical exam along with pertinent physiologic and functional assessment was performed. Patient presents with intractable pain due to the diagnoses listed above. Patient has failed to respond to conservative modalities, previous minimally invasive interventional pain management measures, and previous surgical interventions, as referenced under HPI including the impact of patient's moderate-to-severe pain contributing to significant impairment in daily activities, ADLs, and a negative impact on quality of life. Supporting diagnostic studies of patient's chronic pain condition have been reviewed.  I have reviewed all available patient's medical records as well as previous therapies as referenced above. I had a lengthy conversation with Mr. Chris Patel regarding his chronic pain condition and potential therapeutic options including risks, benefits, alternative therapies, to name a few. We have discussed using a stepwise approach starting with the shortest or least intense level of treatment, care, or service as determined by the extent required to diagnose and or treat patient's condition. The treatments proposed are consistent with the patient's medical condition and are known to be as safe and effective by current guidelines and standard of care. There is no evidence of absolute contraindications for the proposed procedures under the current circumstances. These  treatments are not considered experimental or investigational. The duration and frequency proposed are considered appropriate for the service in accordance with accepted standards of medical practice for the diagnosis and or treatment of the patient's condition and or intended to improve the patient's level of function. These services will be furnished in a setting appropriate to the patient's medical needs and condition. Therefore, I have proposed the following plan:    1. Interventional pain management measures: Patient does not take blood thinners. Patient presents with chronic unrelenting moderate to severe axial mechanical lumbar spine facetogenic pain without evidence of underlying or untreated radiculopathy and that causes functional deficit measured on pain scales and or functional and disability scales. Pain has been present for several years. Chris Patel average pain level for the past months has been rated as 5/10 ranging from 0-2/10 to 9/10. Patient has failed to obtain pain relief or functional improvement from conservative measures, as referenced under HPI. Pain assessment has been performed and documented at baseline, and will be reassessed after each diagnostic procedure using the same pain scale for each assessment. A disability scale has also been obtained at baseline and will be used for functional assessment.  Diagnostic interventions will be performed without sedation or with the use of light sedation (but without the use of opioids) depending on patient's specific medical requirements. For radiofrequency procedures; we may proceed without sedation or with various degrees of sedation according to patient's specific requirements. All bilateral procedures will be done in one encounter unless patient chooses to treat one side only, patient is not able to tolerate prone position to complete both sides during the same encounter.  Patient will be scheduled for a first set of diagnostic bilateral  lumbar medial branch blocks at L3, L4, L5; for bilateral lumbar facet joints at L4-L5, and L5-S1 to clarify the origin of chronic refractory mechanical lower back pain. If patient experiences 80% or more relief along with significant functional improvement and increase in the range of motion of the lumbar spine, then, patient will be scheduled for a second set of diagnostic bilateral lumbar medial branch blocks, to then, proceed with bilateral lumbar medial branch rhizotomies. If patient experiences 50% or more pain relief and functional improvement for at least six months, we could repeat the procedure. If more than 2 years elapse since last RFTC, then, patient will be required to undergo diagnostic blocks prior to repeating RFTC. Otherwise, patient will be scheduled for a spinal cord stimulator trial with [employer]. Patient has received formal education from me including risks, benefits, and alternative treatments, as well as detailed information regarding the procedure and specific goals for the trial. Patient has also received didactic materials including educational booklets and DVDs on spinal cord stimulation therapies. We could also consider PNS, ReActive8    2. Diagnostic studies:  A. MRI of the thoracic spine without contrast to assess capacity and patency of the spinal canal and epidural space prior to spinal cord stimulator trial and implant  B. Flexion and extension X-rays of the lumbar spine to assess lumbar stability  C. EMG/NCV of the bilateral lower extremities   D. CBC, PT, PTT prior to SCS trial    3. Pharmacological measures: Reviewed and discussed;   A. Patient takes gabapentin. Patient also takes trazodone  B. Trial with Rheumate one tablet once daily  C. Start pyridoxine 100 mg one tablet by mouth daily take for 30 days, #30, no refills  D. Start alpha lipoid acid 8550-0075 mg per day divided into 3 doses    4. Long-term rehabilitation efforts:  A. The patient has a history of falls. I did  complete a risk assessment for falls. Fall precautions: Patient has been instructed regarding universal fall precautions, such as;   Using gait aids a cane or a walker at the appropriate height at all times for ambulation   Removing all area rugs and coffee tables to create a safe environment at home  Ensure clean, dry floors  Wearing supportive footwear and properly fitting clothing  Ensure bed/chair is appropriate height and patient's feet can touch the floor  Using a shower transfer bench  Using walk-in shower and having shower safety bars installed  Ensure proper lighting, minimize glare  Have nightlights operational and in use  Participation in an exercise program for gait training, balance training and strength  Avoid carrying laundry up and down steps  Ensure proper compliance and organization of medications to avoid errors   Avoid use of over the counter sedatives and alcohol consumption  Ensure easy access to call bell, glasses, TV control, telephone  Ensure glasses/hearing aids are in use or close by (on top of night table)  B. Patient will start a comprehensive physical therapy program at [facility] for Alter-G, core strengthening, gait and balance training, neurodynamics, E-STIM, myofascial release, cupping, dry needling, home exercise program  C. Start an exercise program such as yoga and water therapy  D. I have spent a significant amount of time talking with the patient and providing specific recommendations regarding lifestyle modification, preventive measures, and self-care management of chronic pain.  In addition, I have provided a copy of the booklet Care of the back by Will Weldon MD and Grsielda Castellano MD to be used as a physician supervised home exercise program  E. Contrast therapy: Apply ice-packs for 15-20 minutes, followed by heating pads for 15-20 minutes to affected area   F. Referral to Dr. Casey Piña for psychological screening for spinal cord stimulation and  intrathecal therapies.  G. Chris Patel  reports that he quit smoking about 4 years ago. His smoking use included cigarettes. He smoked an average of 1 pack per day. He has never used smokeless tobacco.    5. The patient has been instructed to contact my office with any questions or difficulties. The patient understands the plan and agrees to proceed accordingly.    The patient has a documented plan of care to address chronic pain. Chris Patel reports a pain score of 5/10.  Given his pain assessment as noted, treatment options were discussed and the following options were decided upon as a follow-up plan to address the patient's pain: continuation of current treatment plan for pain, educational materials on pain management, home exercises and therapy, prescription for non-opiod analgesics, referral to Physical Therapy, referral to specialist for assistance in pain treatment guidance, steroid injections, use of non-medical modalities (ice, heat, stretching and/or behavior modifications), and interventional pain management measures including neuromodulation techniques .              Pain Management Panel           No data to display                 LINDA query complete. LINDA reviewed by Akin Rodriguez MD.     Pain Medications               gabapentin (NEURONTIN) 300 MG capsule Take 2 capsules by mouth Every Night.    HYDROcodone-acetaminophen (NORCO) 5-325 MG per tablet Take 1 tablet by mouth Every 6 (Six) Hours As Needed for Moderate Pain  for up to 16 doses.    traZODone (DESYREL) 100 MG tablet Take 1 tablet by mouth Daily.             No orders of the defined types were placed in this encounter.       Please note that portions of this note were completed with a voice recognition program.   Any copied data in any portion of my note has been reviewed by myself and accurate.     The 21st Century Cures Act makes medical notes like this available to patients in the interest of transparency. This is a medical  document intended as peer to peer communication. It is written in medical language and may contain abbreviations or verbiage that are unfamiliar. It may appear blunt or direct. Medical documents are intended to carry relevant information, facts as evident, and the clinical opinion of the practitioner.     Akin Rodriguez MD    Patient Care Team:  Shannan Rivera MD as PCP - General (Internal Medicine & Pediatrics)  Shannan Rivera MD as Referring Physician (Internal Medicine & Pediatrics)  Akin Rodriguez MD as Consulting Physician (Pain Medicine)     No orders of the defined types were placed in this encounter.        No future appointments.

## 2023-08-05 PROBLEM — M96.1 LUMBAR POSTLAMINECTOMY SYNDROME: Status: ACTIVE | Noted: 2023-08-05

## 2023-08-08 ENCOUNTER — OFFICE VISIT (OUTPATIENT)
Dept: PAIN MEDICINE | Facility: CLINIC | Age: 78
End: 2023-08-08
Payer: MEDICARE

## 2023-08-08 ENCOUNTER — TELEPHONE (OUTPATIENT)
Dept: PAIN MEDICINE | Facility: CLINIC | Age: 78
End: 2023-08-08

## 2023-08-08 VITALS
TEMPERATURE: 98.4 F | SYSTOLIC BLOOD PRESSURE: 144 MMHG | WEIGHT: 168 LBS | DIASTOLIC BLOOD PRESSURE: 68 MMHG | BODY MASS INDEX: 24.05 KG/M2 | HEIGHT: 70 IN

## 2023-08-08 DIAGNOSIS — M96.1 LUMBAR POSTLAMINECTOMY SYNDROME: ICD-10-CM

## 2023-08-08 DIAGNOSIS — M47.816 SPONDYLOSIS OF LUMBAR REGION WITHOUT MYELOPATHY OR RADICULOPATHY: ICD-10-CM

## 2023-08-08 DIAGNOSIS — M48.062 LUMBAR STENOSIS WITH NEUROGENIC CLAUDICATION: ICD-10-CM

## 2023-08-08 DIAGNOSIS — R53.81 PHYSICAL DECONDITIONING: ICD-10-CM

## 2023-08-08 DIAGNOSIS — M47.816 SPONDYLOSIS OF LUMBAR REGION WITHOUT MYELOPATHY OR RADICULOPATHY: Primary | ICD-10-CM

## 2023-08-08 DIAGNOSIS — R26.9 GAIT DISTURBANCE: ICD-10-CM

## 2023-08-08 DIAGNOSIS — Z01.818 PREOPERATIVE EXAMINATION: ICD-10-CM

## 2023-08-08 DIAGNOSIS — M51.37 DEGENERATION OF LUMBAR OR LUMBOSACRAL INTERVERTEBRAL DISC: ICD-10-CM

## 2023-08-08 DIAGNOSIS — G62.9 POLYNEUROPATHY: ICD-10-CM

## 2023-08-08 DIAGNOSIS — Z00.8 PRE-SURGICAL PSYCHOLOGICAL ASSESSMENT, ENCOUNTER FOR: ICD-10-CM

## 2023-08-08 PROCEDURE — 1159F MED LIST DOCD IN RCRD: CPT | Performed by: ANESTHESIOLOGY

## 2023-08-08 PROCEDURE — 1160F RVW MEDS BY RX/DR IN RCRD: CPT | Performed by: ANESTHESIOLOGY

## 2023-08-08 PROCEDURE — 3078F DIAST BP <80 MM HG: CPT | Performed by: ANESTHESIOLOGY

## 2023-08-08 PROCEDURE — 1125F AMNT PAIN NOTED PAIN PRSNT: CPT | Performed by: ANESTHESIOLOGY

## 2023-08-08 PROCEDURE — 99214 OFFICE O/P EST MOD 30 MIN: CPT | Performed by: ANESTHESIOLOGY

## 2023-08-08 PROCEDURE — 3077F SYST BP >= 140 MM HG: CPT | Performed by: ANESTHESIOLOGY

## 2023-08-08 RX ORDER — MULTIVITAMIN WITH IRON
100 TABLET ORAL DAILY
Qty: 30 TABLET | Refills: 0 | Status: SHIPPED | OUTPATIENT
Start: 2023-08-08

## 2023-08-08 RX ORDER — ST. JOHN'S WORT 300 MG
400 CAPSULE ORAL 3 TIMES DAILY
Qty: 180 CAPSULE | Refills: 5 | Status: SHIPPED | OUTPATIENT
Start: 2023-08-08

## 2023-08-08 RX ORDER — ME-TETRAHYDROFOLATE/B12/HRB236 1-1-500 MG
1 CAPSULE ORAL DAILY
Qty: 90 CAPSULE | Refills: 1 | Status: SHIPPED | OUTPATIENT
Start: 2023-08-08

## 2023-08-08 RX ORDER — TRAZODONE HYDROCHLORIDE 100 MG/1
1 TABLET ORAL DAILY
COMMUNITY
Start: 2023-06-06

## 2023-08-08 NOTE — TELEPHONE ENCOUNTER
Called [facility] pharmacy and s/w pharmacist who confirmed pt has not filled his Eliquis in over a year.  All other meds confirmed as active and compared to current record as accurate.

## 2023-08-09 ENCOUNTER — OUTSIDE FACILITY SERVICE (OUTPATIENT)
Dept: PAIN MEDICINE | Facility: CLINIC | Age: 78
End: 2023-08-09
Payer: MEDICARE

## 2023-08-09 PROCEDURE — 99152 MOD SED SAME PHYS/QHP 5/>YRS: CPT | Performed by: ANESTHESIOLOGY

## 2023-08-09 PROCEDURE — 64494 INJ PARAVERT F JNT L/S 2 LEV: CPT | Performed by: ANESTHESIOLOGY

## 2023-08-09 PROCEDURE — 64493 INJ PARAVERT F JNT L/S 1 LEV: CPT | Performed by: ANESTHESIOLOGY

## 2023-08-11 DIAGNOSIS — M48.062 LUMBAR STENOSIS WITH NEUROGENIC CLAUDICATION: Primary | ICD-10-CM

## 2023-08-16 ENCOUNTER — OUTSIDE FACILITY SERVICE (OUTPATIENT)
Dept: PAIN MEDICINE | Facility: CLINIC | Age: 78
End: 2023-08-16
Payer: MEDICARE

## 2023-08-16 DIAGNOSIS — M48.062 LUMBAR STENOSIS WITH NEUROGENIC CLAUDICATION: Primary | ICD-10-CM

## 2023-08-16 DIAGNOSIS — M47.816 SPONDYLOSIS OF LUMBAR REGION WITHOUT MYELOPATHY OR RADICULOPATHY: ICD-10-CM

## 2023-08-16 PROCEDURE — 64494 INJ PARAVERT F JNT L/S 2 LEV: CPT | Performed by: ANESTHESIOLOGY

## 2023-08-16 PROCEDURE — 99152 MOD SED SAME PHYS/QHP 5/>YRS: CPT | Performed by: ANESTHESIOLOGY

## 2023-08-16 PROCEDURE — 64493 INJ PARAVERT F JNT L/S 1 LEV: CPT | Performed by: ANESTHESIOLOGY

## 2023-08-21 ENCOUNTER — OUTSIDE FACILITY SERVICE (OUTPATIENT)
Dept: PAIN MEDICINE | Facility: CLINIC | Age: 78
End: 2023-08-21
Payer: MEDICARE

## 2023-09-02 DIAGNOSIS — M47.816 SPONDYLOSIS OF LUMBAR REGION WITHOUT MYELOPATHY OR RADICULOPATHY: ICD-10-CM

## 2023-09-05 RX ORDER — DIAPER,BRIEF,ADULT, DISPOSABLE
EACH MISCELLANEOUS
Qty: 30 TABLET | Refills: 0 | OUTPATIENT
Start: 2023-09-05

## 2024-01-11 ENCOUNTER — OFFICE VISIT (OUTPATIENT)
Dept: PAIN MEDICINE | Facility: CLINIC | Age: 79
End: 2024-01-11
Payer: MEDICARE

## 2024-01-11 VITALS — WEIGHT: 163.4 LBS | HEIGHT: 70 IN | BODY MASS INDEX: 23.39 KG/M2

## 2024-01-11 DIAGNOSIS — R53.81 PHYSICAL DECONDITIONING: ICD-10-CM

## 2024-01-11 DIAGNOSIS — M47.816 SPONDYLOSIS OF LUMBAR REGION WITHOUT MYELOPATHY OR RADICULOPATHY: ICD-10-CM

## 2024-01-11 DIAGNOSIS — M62.84 SARCOPENIA: ICD-10-CM

## 2024-01-11 DIAGNOSIS — M51.37 DEGENERATION OF LUMBAR OR LUMBOSACRAL INTERVERTEBRAL DISC: ICD-10-CM

## 2024-01-11 DIAGNOSIS — M48.062 LUMBAR STENOSIS WITH NEUROGENIC CLAUDICATION: ICD-10-CM

## 2024-01-11 DIAGNOSIS — M96.1 LUMBAR POSTLAMINECTOMY SYNDROME: ICD-10-CM

## 2024-01-11 DIAGNOSIS — G62.9 POLYNEUROPATHY: ICD-10-CM

## 2024-01-11 DIAGNOSIS — R26.9 GAIT DISTURBANCE: ICD-10-CM

## 2024-01-11 PROCEDURE — 1160F RVW MEDS BY RX/DR IN RCRD: CPT | Performed by: ANESTHESIOLOGY

## 2024-01-11 PROCEDURE — 99214 OFFICE O/P EST MOD 30 MIN: CPT | Performed by: ANESTHESIOLOGY

## 2024-01-11 PROCEDURE — 1159F MED LIST DOCD IN RCRD: CPT | Performed by: ANESTHESIOLOGY

## 2024-01-11 PROCEDURE — 1125F AMNT PAIN NOTED PAIN PRSNT: CPT | Performed by: ANESTHESIOLOGY

## 2024-01-11 RX ORDER — SILDENAFIL 100 MG/1
1 TABLET, FILM COATED ORAL DAILY
COMMUNITY
Start: 2023-12-05

## 2024-01-11 NOTE — PROGRESS NOTES
"Chief Complaint: \"I did well after my rhizotomies. I don't have back pain. The problem I still have is numbness and pain from my knees down.\"      History of Present Illness: Mr. Chris Patel, 78 y.o. male originally referred by Dr. Herminio Clement in consultation for chronic intractable lower back pain. Chris Patel reported a longstanding history of chronic intractable lower back pain, which began without incident. Chris Patel is an established patient. In September of 2021, he underwent diagnostic and therapeutic bilateral L3-L4 transforaminal epidural steroid injections. Subsequently, on 10/18/2021, he underwent lumbar laminectomies with medial facetectomies and foraminotomies at L3-L4 and L4-L5 by Dr. Kenan Arevalo.  Chris Patel underwent neurosurgical follow-up with Ernestine Becerra PA-C on 06/10/2022, when patient was complaining of residual symptoms. Chris Patel was advised to follow-up with the neurosurgical team on an as-needed basis. Chris Patel did not return to the NS clinic for follow-up. Chris Patel was referred back to me by Teresa Coreas PA-C for Dr. Herminio Clement with [facility] in [facility] for treatment of persistent axial mechanical lower back pain. Specifically,  Dr. Herminio Clement requested consideration for diagnostic bilateral lumbar medial branch blocks and RFTC. If beneficial, Dr Clement would consider L3-S1 lumbar fusion.  Chris Patel described chronic numbness from his knees down into his feet bilaterally in a stocking distribution, severe intolerance to standing or walking, balance difficulties and a history of several falls. He denied symptoms of cauda equina. He reported alleviation of symptoms with sitting or lying down. Chris Patel has a significant component of peripheral neuropathy given the numbness from his knees down that does not change with positions. Patient is no longer smoking but he continues to consume alcohol. Pain has progressed in " intensity over the past years.  MRI of the lumbar spine revealed laminectomy defects at L3-L4 and L4-L5. At L2-L3: facet hypertrophy, loss of disc height. No significant stenosis. L5-S1 Modic endplate changes.  Flexion and extension X-rays of the lumbar spine revealed no obvious instability. Chris Patel failed to obtain pain relief with surgical measures ans well as conservative measures including oral analgesics, opioids, topical analgesics, ice, heat, physical therapy (last visit within the past 18 months), physical therapist directed home exercise program HEP (ongoing), to name a few. He underwent a first set of diagnostic bilateral lumbar MBBs on 08/09/2023 (80-90% pain relief 9/10 to 1-2/10 lasting for about 12 hours), followed by a second set on 08/16/2023 (% pain relief and functional improvement lasting for 12-18 hours), followed by bilateral lumbar medial branch rhizotomies on 08/21/2023, from which he reports 100% ongoing pain relief and modest functional improvement. He is still complaining from symptoms related to lumbar postlaminectomy syndrome, painful peripheral polyneuropathy, and PAD.  Pain Description: Constant bilateral leg pain from the knee down with intermittent exacerbation, described as burning, crushing, sharpand throbbing sensation.   Radiation of Pain: The pain radiates from the knees down to his feet and it is associated with numbness and weakness in his legs  Pain intensity today: 0/10   Average pain intensity last week: 6/10  Pain intensity ranges from: 0/10 to 9/10  Aggravating factors: Pain increases with standing and walking. Patient describes neurogenic severe claudication. Patient does not use a cane or a walker  Alleviating factors: Pain decreases with sitting down, lying down  Associated Symptoms:   Patient reports pain, numbness and weakness from his knees down to his feet due to peripheral polyneuropathy   Patient denies any new bladder or bowel problems.   Patient  reports difficulties with his balance and reports some recent falls.   Pain interferes with ADLs, general activities (ability to walk, stand, transition from different positions), and affects patient's quality of life  Pain does not interfere with sleep     Review of previous therapies and additional medical records:  Chris Patel has already failed the following measures, including:   Conservative Measures: Oral analgesics, opioids, topical analgesics, ice, heat, physical therapy (last visit within the past 18 months), physical therapist directed home exercise program HEP (ongoing)  Interventional Measures:   08/09/2023: First set of diagnostic bilateral lumbar MBBs: 80-90% pain relief 9/10 to 1-2/10 lasting for about 12 hours  08/16/2023: Second set of diagnostic bilateral lumbar MBBs: % pain relief and functional improvement lasting for 12-18 hours  08/21/2023: Bilateral lumbar medial branch rhizotomies: 100% ongoing pain relief and modest functional improvement.  Prior to lumbar surgery:  09/2021: Therapeutic bilateral L3-L4 transforaminal epidural steroid injections    07/19/2021: DxTx bilateral L3-L4 transforaminal epidural steroid injection   Surgical Measures:  10/18/2021: lumbar laminectomies with medial facetectomies and foraminotomies at L3-L4 and L4-L5 by Dr. Kenan Arevalo.    Chris Patel underwent spine neurosurgical consultation with Teresa Coreas PA-C for Dr. Herminio Clement with [facility] in [facility] and was found to be a potential surgical candidate for PLIF L3-S1.  Chris Patel presents with significant comorbidities including osteoarthritis, chronic kidney disease, hyperlipidemia, hearing loss, hypertension, psoriasis, tobacco abuse  In terms of current analgesics, Chris Patel takes: Gabapentin 300 mg BID. Patient also takes trazodone  I have reviewed Gabriel Report consistent with medication reconciliation.  SOAPP:  High Risk    PHQ-2 Depression  Screening  Little interest or pleasure in doing things? 0-->not at all   Feeling down, depressed, or hopeless? 0-->not at all   PHQ-2 Total Score 0       Pain Self-Efficacy Questionnaire (PSEQ)  ITEM 08-08 2023 01-11 2024       I can enjoy things despite the pain. 0 3       I can do most of the household chores (tidying up, washing dishes, etc), despite the pain. 6 4       I can socialize with my friends or family members as often as I used to do, despite the pain. 6 4       I can cope with my pain in most situations. 6 4       I can do some form of work, despite the pain (includes housework, paid, and unpaid work). 6 5       I can still do many of the things I enjoy doing, such as hobbies or leisure activity despite pain. 6 4       I can cope with my pain without medications. 6 4       I can accomplish most of my goals in life despite the pain. 6 3       I can live in a normal lifestyle, despite the pain. 6 2       I can gradually become more active, despite the pain. 5 4       TOTAL SCORE 53/60 37/60           Global Pain Scale 08-08 2023 01-11 2024         Pain 20 12         Feelings 6 0         Clinical outcomes 12 3         Activities 20 10         GPS Total: 58 25           The [place] Back Pain Disability Scale   DATE 08-08 2023 01-11 2024         Sleep through the night 3 1         Turn over in bed 0 0         Get out of bed 5 0         Make your bed 0 0         Put on socks (pantyhose) 1 0         Ride in a car 0 0         Sit in a chair for several hours 2 1         Stand up for 20-30 minutes 2 1         Climb one flight of stairs 2 1         Walk a few blocks (200-300 yards)  5 1         Walk several miles 5 2         Run one block (about 50 yards) 5 2         Take food out of the refrigerator 0 0         Reach up to high shelves 0 1         Move a chair 1 1         Pull or push heavy doors 1 1         Bend over to clean the bathtub 3 1         Throw a ball 0 1         Carry two bags of groceries 3 1          Lift and carry a heavy suitcase 5 1         Total score 43 16           Diagnostic Studies:  I have independently reviewed and interpreted the images with the patient and used the images and a tridimensional spine model to explain findings. I have also reviewed the reports.  MRI of the cervical spine without contrast on 07/13/2023 revealed multilevel cervical spondylosis.  Axial imaging:  C2-C3: Facet hypertrophy, disc osteophyte complex. Mild canal stenosis.  C3-C4: Facet hypertrophy, disc osteophyte complex. Mild canal stenosis. Moderate bilateral foraminal stenosis  C4-C5: Facet hypertrophy, disc osteophyte complex. Mild canal stenosis. Moderate to severe bilateral foraminal stenosis  C5-C6: Disc osteophyte complex, facet hypertrophy. Mild canal stenosis.  Severe bilateral foraminal stenosis  C6-C7: Facet hypertrophy, disc osteophyte complex. Mild canal stenosis. Moderate bilateral foraminal stenosis  MRI of the lumbar spine without contrast on 04/20/2023. Report. Patient did not bring the disc.Mild lumbar levoscoliosis.  Multilevel spondylosis with degenerative disc disease and facet hypertrophy at all lumbar levels.  Axial imaging:  T11-T12, T12-L1, L1-L2: No significant canal or foraminal stenosis  L2-L3: Slight retrolisthesis, disc bulge, facet hypertrophy.  Mild canal stenosis and mild foraminal stenosis  L3-L4: Evidence of previous laminectomy.  Disc bulge.  Facet hypertrophy.  No significant canal stenosis.  Mild foraminal stenosis  L4-L5: Evidence of previous laminectomy.  Disc bulge.  Facet hypertrophy.  No significant canal stenosis.  Mild foraminal stenosis   L5-S1: Disc bulge, facet hypertrophy.  No significant canal stenosis.  Mild left foraminal stenosis    The following portions of the patient's history were reviewed and updated as appropriate: problem list, past medical history, past surgery history, social history, family history, medication reconciliation, and allergies    Review of  "Systems   HENT:  Positive for hearing loss.    Musculoskeletal:  Positive for back pain.   Neurological:  Positive for numbness.   All other systems reviewed and are negative.        Patient Active Problem List   Diagnosis    Lumbar stenosis with neurogenic claudication    Spondylosis of lumbar region without myelopathy or radiculopathy    Degeneration of lumbar or lumbosacral intervertebral disc    Physical deconditioning    Gait disturbance    FRANCISCO JAVIER (acute kidney injury)    Bilateral low back pain without sciatica    Body mass index (BMI) of 20 to 24    Cigarette smoker    Decreased GFR    Mixed hyperlipidemia    Hypertension, benign    Screening for depression    Lumbar postlaminectomy syndrome    Polyneuropathy    Sarcopenia       Past Medical History:   Diagnosis Date    Arthritis     Back pain     Cancer     pre skin cancerous areas - few     Chronic kidney disease     \"weak kidneys\"    Full dentures     full top and partial at bottom     High cholesterol     Resighini (hard of hearing)     bilat hearing aids     Hypertension     Psoriasis     Wears glasses     Wears hearing aid     bilat          Past Surgical History:   Procedure Laterality Date    HERNIA REPAIR      umbi;lical hernia - with mesh     LUMBAR LAMINECTOMY DISCECTOMY DECOMPRESSION N/A 10/18/2021    Procedure: LUMBAR LAMINECTOMY  L3-5;  Surgeon: Kenan Arevalo MD;  Location: [facility];  Service: Neurosurgery;  Laterality: N/A;         Family History   Family history unknown: Yes         Social History     Socioeconomic History    Marital status:    Tobacco Use    Smoking status: Former     Packs/day: 1     Types: Cigarettes     Quit date: 2019     Years since quittin.0    Smokeless tobacco: Never    Tobacco comments:     smoked on and off    Vaping Use    Vaping Use: Never used   Substance and Sexual Activity    Alcohol use: Yes     Alcohol/week: 7.0 standard drinks of alcohol     Types: 7 Shots of liquor per week     Comment: dmitry    " "Drug use: Never    Sexual activity: Defer           Current Outpatient Medications:     Alpha Lipoic Acid 200 MG capsule, Take 400 mg by mouth 3 (Three) Times a Day., Disp: 180 capsule, Rfl: 5    atorvastatin (LIPITOR) 40 MG tablet, Take 1 tablet by mouth every night at bedtime., Disp: , Rfl:     Dietary Management Product (Rheumate) capsule, Take 1 capsule by mouth Daily., Disp: 90 capsule, Rfl: 1    gabapentin (NEURONTIN) 300 MG capsule, Take 2 capsules by mouth Every Night., Disp: , Rfl:     HYDROcodone-acetaminophen (NORCO) 5-325 MG per tablet, Take 1 tablet by mouth Every 6 (Six) Hours As Needed for Moderate Pain  for up to 16 doses., Disp: 15 tablet, Rfl: 0    lisinopril (PRINIVIL,ZESTRIL) 40 MG tablet, Take 1 tablet by mouth Daily., Disp: , Rfl:     Risankizumab-rzaa (SKYRIZI, 150 MG DOSE, SC), Inject 75 mg under the skin into the appropriate area as directed. Every 3 months-   Ld 8th, Disp: , Rfl:     sildenafil (VIAGRA) 100 MG tablet, Take 1 tablet by mouth Daily., Disp: , Rfl:     traZODone (DESYREL) 100 MG tablet, Take 1 tablet by mouth Daily., Disp: , Rfl:     vitamin B-6 (PYRIDOXINE) 100 MG tablet, Take 1 tablet by mouth Daily., Disp: 30 tablet, Rfl: 0      Allergies   Allergen Reactions    Penicillins Hives         Ht 177.8 cm (70\")   Wt 74.1 kg (163 lb 6.4 oz)   BMI 23.45 kg/m²       Physical Exam:  Findings on 01/11/2024  Constitutional: Patient appears well-developed, well-nourished, well-hydrated, appears younger than stated age  HEENT: Head: Normocephalic and atraumatic  Eyes: Conjunctivae and lids are normal  Pupils: Equal, round, reactive to light  Peripheral vascular exam: Posterior tibialis: right 1+ and left 1+. Dorsalis pedis: right 1+ and left 1+. 1+ edema.   Musculoskeletal   Gait and station: Gait evaluation demonstrated shuffling, wide base. Unable to walk on heels, toes, tandem walking   Lumbar spine: Passive and active range of motion are appropriate for age and condition. Extension " of the lumbar spine did not increase or reproduce pain. Lumbar facet joint loading maneuvers are negative.  Thomas test and Gaenslen's test: Negative   Piriformis maneuvers: Negative   Palpation of the bilateral ischial tuberosities: Unrevealing   Hip joints: The range of motion of the hip joints is almost full and without pain   Palpation of the bilateral greater trochanter: Unrevealing   Examination of the Iliotibial band: Unrevealing   Neurological:   Patient is alert and oriented to person, place, and time.   Speech: Normal.   Cortical function: Normal mental status.   Reflex Scores:  Right patellar: 1+  Left patellar: 1+  Right Achilles: 0+  Left Achilles: 0+  Motor strength: 5/5  Motor Tone: Normal  Involuntary movements: None.   Superficial/Primitive Reflexes: Primitive reflexes were absent.   Right La: Absent  Left La: Absent  Right ankle clonus: Absent  Left ankle clonus: Absent   Babinsky:  Absent  Long tract signs: Negative. Straight leg raising test:  Negative. Femoral stretch sign: Negative.   Sensory exam: Intact to light touch, intact pain and temperature sensation, intact vibration sensation and normal proprioception, except for decreased vibration in a stocking distribution from his knees down.   Coordination: Normal finger to nose. Romberg's sign: Negative    Skin and subcutaneous tissue: Skin is warm and intact. No rash noted. No cyanosis.   Psychiatric: Judgment and insight: Normal. Recent and remote memory: Intact. Mood and affect: Normal.       ASSESSMENT:   1. Lumbar postlaminectomy syndrome    2. Polyneuropathy    3. Lumbar stenosis with neurogenic claudication    4. Spondylosis of lumbar region without myelopathy or radiculopathy    5. Degeneration of lumbar or lumbosacral intervertebral disc    6. Sarcopenia    7. Gait disturbance    8. Physical deconditioning        PLAN/MEDICAL DECISION MAKING:  Mr. Chris Patel, 78 y.o. male originally referred by Dr. Herminio Clement in  consultation for chronic intractable lower back pain. Chris Patel reported a longstanding history of chronic intractable lower back pain associated with neurogenic claudication and painful peripheral polyneuropathy. On 10/18/2021, he underwent lumbar laminectomies with medial facetectomies and foraminotomies at L3-L4 and L4-L5 by Dr. Kenan Arevalo.  Chris Patel underwent neurosurgical follow-up with Ernestine Becerra PA-C on 06/10/2022, when patient was complaining of residual symptoms. Chris Patel was advised to follow-up with the neurosurgical team. However, Chris Patel did not return to the NS clinic for follow-up. Chris Patel was referred back to me by Teresa Coreas PA-C for Dr. Herminio Clement with [facility] in [facility] for treatment of persistent axial mechanical lower back pain. Specifically,  Dr. Herminio Clement requested consideration for diagnostic bilateral lumbar medial branch blocks and RFTC. If beneficial, Dr Clement may consider L3-S1 lumbar fusion. Chris Patel describes chronic numbness and pain from his knees down into his feet bilaterally in a stocking distribution, severe intolerance to standing or walking, balance difficulties and a history of several falls. He denies symptoms of cauda equina. He reports alleviation of symptoms with sitting or lying down. Chris Patel presents with significant peripheral neuropathy given that the numbness from his knees down that does not change with positions. Patient no longer smokes but continues to consume alcohol daily. MRI of the lumbar spine revealed laminectomy defects at L3-L4 and L4-L5. At L2-L3: facet hypertrophy, loss of disc height. No significant stenosis. L5-S1 Modic endplate changes. Flexion and extension X-rays of the lumbar spine revealed no obvious instability. Chris Patel failed to obtain pain relief with surgical measures and conservative measures including oral analgesics, opioids, topical analgesics, ice,  heat, physical therapy (last visit within the past 18 months), physical therapist directed home exercise program HEP (ongoing), to name a few. He underwent a first set of diagnostic bilateral lumbar MBBs on 08/09/2023 (80-90% pain relief 9/10 to 1-2/10 lasting for about 12 hours), followed by a second set on 08/16/2023 (% pain relief and functional improvement lasting for 12-18 hours), followed by bilateral lumbar medial branch rhizotomies on 08/21/2023, from which he reports 100% ongoing pain relief and modest functional improvement. He continues struggling with symptoms related to lumbar postlaminectomy syndrome, painful peripheral polyneuropathy, and PAD. A comprehensive evaluation including history and physical exam along with pertinent physiologic and functional assessment was performed. Patient presents with intractable pain due to the diagnoses listed above. Patient has failed to respond to conservative modalities and previous surgical interventions, as referenced under HPI. Patient responded well to minimally invasive interventional pain management measures, as referenced from previous notes and under HPI. I have documented the impact of patient's moderate-to-severe pain contributing to significant impairment in daily activities, ADLs, and a negative impact on the patient's quality of life, as reflected on Global Pain Scale 25/100 (down from 58/100); The [place] Back Pain Disability Scale 16/100 (down from 43/100); Tinetti Gait & Balance Assessment Tool  (high risk for falls). I have reviewed pertinent supporting diagnostic studies of patient's chronic pain condition as well as all available pertinent medical records to patient's chronic pain condition including previous therapies, as referenced above. PHQ-2 Depression Screening 0; Pain Self-Efficacy Questionnaire (PSEQ) 16/60 (down from 43/100).  I had a lengthy conversation with Chris Patel regarding his chronic pain condition and potential  therapeutic options including risks, benefits, alternative therapies, to name a few. We have discussed using a stepwise approach starting with the least intense level of care as determined by the extent required to diagnose and or treat a patient's condition. The proposed treatments are consistent with the patient's medical condition and known to be safe and effective by current guidelines and the standard of care. The duration and frequency proposed are considered appropriate for the service in accordance with accepted standards of medical practice for the diagnosis and treatment of the patient's condition and intended to improve the patient's level of function. These services will be furnished in a setting appropriate to the patient's medical needs and condition. Therefore, I have proposed the following plan:    1. Interventional pain management measures: Patient does not take blood thinners.   A. Treatment of Lumbar Postlaminectomy Syndrome/Peripheral polyneuropathy/PAD: Patient will be scheduled for a spinal cord stimulator trial with [employer]. Patient has received formal education from me including risks, benefits, and alternative treatments, as well as detailed information regarding the procedure and specific goals for the trial. Patient has also received didactic materials including educational booklets and DVDs on spinal cord stimulation therapies. We could also consider PNS, ReActiv8, ITT. We have also discussed prospects of diagnostic and therapeutic bilateral L4-L5 transforaminal epidural steroid injections using the lowest effective dose of steroids, under C-arm fluoroscopic guidance, with the use of contrast dye (unless contraindicated) to confirm appropriate needle placement and spread of contrast dye. We may repeat therapeutic bilateral L4-L5 transforaminal epidural steroid injections depending on patient's outcome and following current guidelines: Epidurals will be limited to a maximum of 4 sessions  per spinal region in a rolling twelve (12) month period. Continuation of epidural steroid injections over 12 months would only be considered under the following provisions;  Patient is a high-risk surgical candidate, or the patient does not desire surgery, or recurrence of pain in the same location relieved with ESIs for at least three months and epidural provides at least 50% sustained improvement of pain and/or 50% objective improvement in function (using same scale as baseline)  Pain is severe enough to cause a significant degree of functional disability or vocational disability  The primary care provider will be notified regarding continuation of procedures and repeat steroid use   B. Treatment of Chronic Axial Mechanical Lower Back Pain: If patient experiences 50% or more pain relief and functional improvement for at least six months (he has reached that goal), we could repeat the procedure. If more than 2 years elapse since last RFTC, then, the patient will be required to undergo diagnostic blocks prior to repeating RFTC.     2. Diagnostic studies:  A. MRI of the thoracic spine without contrast to assess capacity and patency of the spinal canal and epidural space prior to spinal cord stimulator trial and implant  B. Flexion and extension X-rays of the lumbar spine to assess lumbar stability  C. CBC, PT, PTT prior to SCS trial  D. EMG/NCV of the bilateral lower extremities     3. Pharmacological measures: Reviewed and discussed;   A. Patient takes gabapentin. Patient also takes trazodone  B. Trial with Rheumate one tablet once daily  C. Start pyridoxine 100 mg one tablet by mouth daily take for 30 days, #30, no refills  D. Start alpha lipoid acid [id number] mg per day divided into 3 doses    4. Long-term rehabilitation efforts:  A. The patient has a history of falls. I did complete a risk assessment for falls. Fall precautions: Patient has been instructed regarding universal fall precautions, such as;   Using  gait aids a cane or a walker at the appropriate height at all times for ambulation   Removing all area rugs and coffee tables to create a safe environment at home  Ensure clean, dry floors  Wearing supportive footwear and properly fitting clothing  Ensure bed/chair is appropriate height and patient's feet can touch the floor  Using a shower transfer bench  Using walk-in shower and having shower safety bars installed  Ensure proper lighting, minimize glare  Have nightlights operational and in use  Participation in an exercise program for gait training, balance training and strength  Avoid carrying laundry up and down steps  Ensure proper compliance and organization of medications to avoid errors   Avoid use of over the counter sedatives and alcohol consumption  Ensure easy access to call bell, glasses, TV control, telephone  Ensure glasses/hearing aids are in use or close by (on top of night table)  B. Patient will start a comprehensive physical therapy program at Counts include 234 beds at the [facility] for Alter-G, core strengthening, gait and balance training, neurodynamics, E-STIM, myofascial release, cupping, dry needling, home exercise program  C. Start an exercise program such as yoga and water therapy  D. I have spent a significant amount of time talking with the patient and providing specific recommendations regarding lifestyle modification, preventive measures, and self-care management of chronic pain.  In addition, I have provided a copy of the booklet Care of the back by Will Weldon MD and Griselda Castellano MD to be used as a physician supervised home exercise program  E. Contrast therapy: Apply ice-packs for 15-20 minutes, followed by heating pads for 15-20 minutes to affected area   F. Referral to Dr. Casey Piña for psychological screening for spinal cord stimulation and intrathecal therapies.  DARIELAInez HERNANDEZ Jorge  reports that he quit smoking about 5 years ago. His smoking use included cigarettes. He smoked an  average of 1 pack per day. He has never used smokeless tobacco.    5. The patient has been instructed to contact my office with any questions or difficulties. The patient understands the plan and agrees to proceed accordingly.    The patient has a documented plan of care to address chronic pain. Chris Patel reports a pain score of 9/10.  Given his pain assessment as noted, treatment options were discussed and the following options were decided upon as a follow-up plan to address the patient's pain: continuation of current treatment plan for pain, educational materials on pain management, home exercises and therapy, prescription for non-opiod analgesics, referral to Physical Therapy, referral to specialist for assistance in pain treatment guidance, steroid injections, use of non-medical modalities (ice, heat, stretching and/or behavior modifications), and interventional pain management measures including neuromodulation techniques .              Pain Management Panel           No data to display                 LINDA query complete. LINDA reviewed by Akin Rodriguez MD.     Pain Medications               gabapentin (NEURONTIN) 300 MG capsule Take 2 capsules by mouth Every Night.    HYDROcodone-acetaminophen (NORCO) 5-325 MG per tablet Take 1 tablet by mouth Every 6 (Six) Hours As Needed for Moderate Pain  for up to 16 doses.    traZODone (DESYREL) 100 MG tablet Take 1 tablet by mouth Daily.             No orders of the defined types were placed in this encounter.     Time spent face-to-face with the patient: 29 minutes  Time spent reviewing diagnostic studies, consultation reports, previous treatments, ordering diagnostic studies, referrals, and writing this note: 6 minutes  Total Time: 35 minutes    Please note that portions of this note were completed with a voice recognition program.   Any copied data in any portion of my note has been reviewed by myself and accurate.     The 21st Century Cures Act makes  medical notes like this available to patients in the interest of transparency. This is a medical document intended as peer to peer communication. It is written in medical language and may contain abbreviations or verbiage that are unfamiliar. It may appear blunt or direct. Medical documents are intended to carry relevant information, facts as evident, and the clinical opinion of the practitioner.     Akin Rodriguez MD    Patient Care Team:  Shannan Rivera MD as PCP - General (Internal Medicine & Pediatrics)  Shannan Rivera MD as Referring Physician (Internal Medicine & Pediatrics)  Akin Rodriguez MD as Consulting Physician (Pain Medicine)     No orders of the defined types were placed in this encounter.        No future appointments.

## 2024-02-05 ENCOUNTER — TELEPHONE (OUTPATIENT)
Dept: PAIN MEDICINE | Facility: CLINIC | Age: 79
End: 2024-02-05
Payer: MEDICARE

## 2024-02-05 NOTE — TELEPHONE ENCOUNTER
Spoke with pt and advised that he still needs to see Dr. Piña, and pt advised Dr. Rodriguez told him we were going to go ahead and do the trial, and then he can see Dr. Piña.  I advised I would talk to Dr. Rodriguez and find out what he wants to do. Pt understood.

## 2024-02-05 NOTE — TELEPHONE ENCOUNTER
Hub staff attempted to follow warm transfer process and was unsuccessful     Caller: Chris Patel    Relationship to patient: Self    Best call back number:[phone] (home)       Patient is needing: TO SCHEDULE PROCEDURE

## 2024-02-08 ENCOUNTER — HOSPITAL ENCOUNTER (OUTPATIENT)
Dept: GENERAL RADIOLOGY | Facility: HOSPITAL | Age: 79
Discharge: HOME | End: 2024-02-08
Payer: MEDICARE

## 2024-02-08 ENCOUNTER — HOSPITAL ENCOUNTER (OUTPATIENT)
Dept: MRI IMAGING | Facility: HOSPITAL | Age: 79
Discharge: HOME | End: 2024-02-08
Payer: MEDICARE

## 2024-02-08 DIAGNOSIS — Z01.818 PREOPERATIVE EXAMINATION: ICD-10-CM

## 2024-02-08 DIAGNOSIS — M47.816 SPONDYLOSIS OF LUMBAR REGION WITHOUT MYELOPATHY OR RADICULOPATHY: ICD-10-CM

## 2024-02-08 PROCEDURE — 72146 MRI CHEST SPINE W/O DYE: CPT

## 2024-02-08 PROCEDURE — 72120 X-RAY BEND ONLY L-S SPINE: CPT

## 2024-03-04 ENCOUNTER — APPOINTMENT (OUTPATIENT)
Dept: CT IMAGING | Facility: HOSPITAL | Age: 79
End: 2024-03-04
Payer: MEDICARE

## 2024-03-04 ENCOUNTER — HOSPITAL ENCOUNTER (EMERGENCY)
Facility: HOSPITAL | Age: 79
Discharge: HOME | End: 2024-03-04
Attending: EMERGENCY MEDICINE | Admitting: EMERGENCY MEDICINE
Payer: MEDICARE

## 2024-03-04 VITALS
HEART RATE: 102 BPM | OXYGEN SATURATION: 94 % | TEMPERATURE: 97.3 F | DIASTOLIC BLOOD PRESSURE: 78 MMHG | WEIGHT: 165 LBS | HEIGHT: 70 IN | BODY MASS INDEX: 23.62 KG/M2 | RESPIRATION RATE: 20 BRPM | SYSTOLIC BLOOD PRESSURE: 128 MMHG

## 2024-03-04 DIAGNOSIS — S22.42XA CLOSED FRACTURE OF MULTIPLE RIBS OF LEFT SIDE, INITIAL ENCOUNTER: Primary | ICD-10-CM

## 2024-03-04 DIAGNOSIS — K82.8 MASS OF GALLBLADDER: ICD-10-CM

## 2024-03-04 DIAGNOSIS — I71.21 ANEURYSM OF ASCENDING AORTA WITHOUT RUPTURE: ICD-10-CM

## 2024-03-04 DIAGNOSIS — S82.841A CLOSED BIMALLEOLAR FRACTURE OF RIGHT ANKLE, INITIAL ENCOUNTER: ICD-10-CM

## 2024-03-04 LAB
ALBUMIN SERPL-MCNC: 4.5 G/DL (ref 3.5–5.2)
ALBUMIN/GLOB SERPL: 1.5 G/DL
ALP SERPL-CCNC: 68 U/L (ref 39–117)
ALT SERPL W P-5'-P-CCNC: 10 U/L (ref 1–41)
ANION GAP SERPL CALCULATED.3IONS-SCNC: 13 MMOL/L (ref 5–15)
AST SERPL-CCNC: 21 U/L (ref 1–40)
BASOPHILS # BLD AUTO: 0.03 10*3/MM3 (ref 0–0.2)
BASOPHILS NFR BLD AUTO: 0.3 % (ref 0–1.5)
BILIRUB SERPL-MCNC: 1 MG/DL (ref 0–1.2)
BUN BLDA-MCNC: 11 MG/DL
BUN BLDA-MCNC: 23 MG/DL (ref 8–26)
BUN SERPL-MCNC: 22 MG/DL (ref 8–23)
BUN/CREAT SERPL: 20.8 (ref 7–25)
CA-I BLDA-SCNC: 1.13 MMOL/L (ref 1.2–1.32)
CALCIUM BLD QL: 1.17 MG/DL
CALCIUM SPEC-SCNC: 9.3 MG/DL (ref 8.6–10.5)
CHLORIDE BLDA-SCNC: 100 MMOL/L (ref 98–109)
CHLORIDE BLDA-SCNC: 104 MMOL/L (ref 98–109)
CHLORIDE SERPL-SCNC: 100 MMOL/L (ref 98–107)
CO2 BLDA-SCNC: 25 MMOL/L (ref 24–29)
CO2 SERPL-SCNC: 23 MMOL/L (ref 22–29)
CREAT BLDA-MCNC: 0.8 MG/DL (ref 0.6–1.3)
CREAT BLDA-MCNC: 1.2 MG/DL (ref 0.6–1.3)
CREAT SERPL-MCNC: 1.06 MG/DL (ref 0.76–1.27)
DEPRECATED RDW RBC AUTO: 54.4 FL (ref 37–54)
EGFRCR SERPLBLD CKD-EPI 2021: 61.5 ML/MIN/1.73
EGFRCR SERPLBLD CKD-EPI 2021: 71.4 ML/MIN/1.73
EOSINOPHIL # BLD AUTO: 0.02 10*3/MM3 (ref 0–0.4)
EOSINOPHIL NFR BLD AUTO: 0.2 % (ref 0.3–6.2)
ERYTHROCYTE [DISTWIDTH] IN BLOOD BY AUTOMATED COUNT: 14.7 % (ref 12.3–15.4)
GLOBULIN UR ELPH-MCNC: 3.1 GM/DL
GLUCOSE BLDC GLUCOMTR-MCNC: 108 MG/DL (ref 70–130)
GLUCOSE BLDC GLUCOMTR-MCNC: 92 MG/DL (ref 70–130)
GLUCOSE SERPL-MCNC: 91 MG/DL (ref 65–99)
HCT VFR BLD AUTO: 43.2 % (ref 37.5–51)
HCT VFR BLDA CALC: 43 % (ref 38–51)
HCT VFR BLDA CALC: 44 % (ref 38–51)
HGB BLD-MCNC: 14.6 G/DL (ref 13–17.7)
HGB BLDA-MCNC: 14.6 G/DL (ref 12–17)
HGB BLDA-MCNC: 15 G/DL (ref 12–17)
HOLD SPECIMEN: NORMAL
IMM GRANULOCYTES # BLD AUTO: 0.04 10*3/MM3 (ref 0–0.05)
IMM GRANULOCYTES NFR BLD AUTO: 0.5 % (ref 0–0.5)
LYMPHOCYTES # BLD AUTO: 1.05 10*3/MM3 (ref 0.7–3.1)
LYMPHOCYTES NFR BLD AUTO: 12 % (ref 19.6–45.3)
MCH RBC QN AUTO: 34 PG (ref 26.6–33)
MCHC RBC AUTO-ENTMCNC: 33.8 G/DL (ref 31.5–35.7)
MCV RBC AUTO: 100.5 FL (ref 79–97)
MONOCYTES # BLD AUTO: 0.53 10*3/MM3 (ref 0.1–0.9)
MONOCYTES NFR BLD AUTO: 6 % (ref 5–12)
NEUTROPHILS NFR BLD AUTO: 7.1 10*3/MM3 (ref 1.7–7)
NEUTROPHILS NFR BLD AUTO: 81 % (ref 42.7–76)
NRBC BLD AUTO-RTO: 0 /100 WBC (ref 0–0.2)
PLATELET # BLD AUTO: 173 10*3/MM3 (ref 140–450)
PMV BLD AUTO: 10.5 FL (ref 6–12)
POTASSIUM BLDA-SCNC: 3.8 MMOL/L (ref 3.5–4.9)
POTASSIUM BLDA-SCNC: 4.2 MMOL/L (ref 3.5–4.9)
POTASSIUM SERPL-SCNC: 4.2 MMOL/L (ref 3.5–5.2)
PROT SERPL-MCNC: 7.6 G/DL (ref 6–8.5)
RBC # BLD AUTO: 4.3 10*6/MM3 (ref 4.14–5.8)
SODIUM BLD-SCNC: 138 MMOL/L (ref 138–146)
SODIUM BLD-SCNC: 141 MMOL/L (ref 138–146)
SODIUM SERPL-SCNC: 136 MMOL/L (ref 136–145)
WBC NRBC COR # BLD AUTO: 8.77 10*3/MM3 (ref 3.4–10.8)
WHOLE BLOOD HOLD COAG: NORMAL
WHOLE BLOOD HOLD SPECIMEN: NORMAL

## 2024-03-04 PROCEDURE — 71250 CT THORAX DX C-: CPT

## 2024-03-04 PROCEDURE — 70450 CT HEAD/BRAIN W/O DYE: CPT

## 2024-03-04 PROCEDURE — 85014 HEMATOCRIT: CPT

## 2024-03-04 PROCEDURE — 25810000003 SODIUM CHLORIDE 0.9 % SOLUTION: Performed by: PHYSICIAN ASSISTANT

## 2024-03-04 PROCEDURE — 74177 CT ABD & PELVIS W/CONTRAST: CPT

## 2024-03-04 PROCEDURE — 99285 EMERGENCY DEPT VISIT HI MDM: CPT

## 2024-03-04 PROCEDURE — 25510000001 IOPAMIDOL 61 % SOLUTION: Performed by: EMERGENCY MEDICINE

## 2024-03-04 PROCEDURE — 80053 COMPREHEN METABOLIC PANEL: CPT | Performed by: PHYSICIAN ASSISTANT

## 2024-03-04 PROCEDURE — 85025 COMPLETE CBC W/AUTO DIFF WBC: CPT | Performed by: PHYSICIAN ASSISTANT

## 2024-03-04 PROCEDURE — 80047 BASIC METABLC PNL IONIZED CA: CPT

## 2024-03-04 RX ORDER — HYDROCODONE BITARTRATE AND ACETAMINOPHEN 5; 325 MG/1; MG/1
1 TABLET ORAL EVERY 8 HOURS PRN
Qty: 15 TABLET | Refills: 0 | Status: SHIPPED | OUTPATIENT
Start: 2024-03-04

## 2024-03-04 RX ORDER — SODIUM CHLORIDE 0.9 % (FLUSH) 0.9 %
10 SYRINGE (ML) INJECTION AS NEEDED
Status: DISCONTINUED | OUTPATIENT
Start: 2024-03-04 | End: 2024-03-04 | Stop reason: HOSPADM

## 2024-03-04 RX ORDER — LIDOCAINE 50 MG/G
1 PATCH TOPICAL EVERY 24 HOURS
Qty: 6 PATCH | Refills: 0 | Status: SHIPPED | OUTPATIENT
Start: 2024-03-04 | End: 2024-03-10

## 2024-03-04 RX ADMIN — IOPAMIDOL 75 ML: 612 INJECTION, SOLUTION INTRAVENOUS at 12:58

## 2024-03-04 RX ADMIN — SODIUM CHLORIDE 1000 ML: 9 INJECTION, SOLUTION INTRAVENOUS at 12:08

## 2024-03-04 NOTE — ED PROVIDER NOTES
"Subjective   History of Present Illness  Pt is a 79 yo male presenting to ED with complaints of pain after a fall. PMHx significant for HTN, CKD, HLD, Psoriasis, Skin cancer and CBP. Pt reports he fell last night hitting his left chest. He did hit his head but denies LOC. He has had pain in left chest with movement and breathing today. He does not take blood thinners. He denies new neck or back pain from baseline. He is brought to ED by his Aunt. He admits to drinking ETOH last night. He denies SOB, cough, fever, N/V or abdominal pain. No extremity pain. He denies tobacco or drug use.    History provided by:  Medical records and patient      Review of Systems   Constitutional:  Negative for fever.   Respiratory:  Negative for cough and shortness of breath.    Gastrointestinal:  Negative for abdominal pain, nausea and vomiting.   Musculoskeletal:  Negative for arthralgias, back pain and neck pain.        Chest wall pain     Skin:  Negative for wound.   Neurological:  Negative for dizziness, syncope, weakness, numbness and headaches.   Psychiatric/Behavioral:  Negative for confusion.        Past Medical History:   Diagnosis Date    Arthritis     Back pain     Cancer     pre skin cancerous areas - few     Chronic kidney disease     \"weak kidneys\"    Full dentures     full top and partial at bottom     High cholesterol     [place] (hard of hearing)     bilat hearing aids     Hypertension     Psoriasis     Wears glasses     Wears hearing aid     bilat        Allergies   Allergen Reactions    Penicillins Hives       Past Surgical History:   Procedure Laterality Date    HERNIA REPAIR      umbi;lical hernia - with mesh     LUMBAR LAMINECTOMY DISCECTOMY DECOMPRESSION N/A 10/18/2021    Procedure: LUMBAR LAMINECTOMY  L3-5;  Surgeon: Kenan Arevalo MD;  Location: [facility];  Service: Neurosurgery;  Laterality: N/A;       Family History   Family history unknown: Yes       Social History     Socioeconomic History    Marital " status:    Tobacco Use    Smoking status: Former     Current packs/day: 0.00     Types: Cigarettes     Quit date: 2019     Years since quittin.1    Smokeless tobacco: Never    Tobacco comments:     smoked on and off    Vaping Use    Vaping status: Never Used   Substance and Sexual Activity    Alcohol use: Yes     Alcohol/week: 7.0 standard drinks of alcohol     Types: 7 Shots of liquor per week     Comment: bourbon    Drug use: Never    Sexual activity: Defer           Objective   Physical Exam  Vitals and nursing note reviewed.   Constitutional:       General: He is not in acute distress.  HENT:      Head: Abrasion (forehead) present.   Eyes:      Extraocular Movements: Extraocular movements intact.      Conjunctiva/sclera: Conjunctivae normal.      Pupils: Pupils are equal, round, and reactive to light.   Cardiovascular:      Rate and Rhythm: Tachycardia present.      Heart sounds: Normal heart sounds.   Pulmonary:      Effort: Pulmonary effort is normal. No respiratory distress.   Chest:      Chest wall: Tenderness (left anterior and mid axillary) present. No deformity or crepitus.   Abdominal:      Palpations: Abdomen is soft.      Tenderness: There is no abdominal tenderness.      Comments: Left lateral abdominal wall contusions    Musculoskeletal:         General: Normal range of motion.      Cervical back: Normal range of motion and neck supple. No tenderness. Normal range of motion.      Thoracic back: No tenderness. Normal range of motion.      Lumbar back: No tenderness. Normal range of motion.   Skin:     General: Skin is warm.   Neurological:      General: No focal deficit present.      Mental Status: He is alert.   Psychiatric:         Mood and Affect: Mood normal.         Behavior: Behavior normal.         Procedures           ED Course  ED Course as of 24 1857   Mon Mar 04, 2024   1224 Creatinine: 1.20  Improved from prior [RT]      ED Course User Index  [RT] Rylie Mccall PA       Recent Results (from the past 24 hour(s))   Comprehensive Metabolic Panel    Collection Time: 03/04/24 12:07 PM    Specimen: Blood   Result Value Ref Range    Glucose 91 65 - 99 mg/dL    BUN 22 8 - 23 mg/dL    Creatinine 1.06 0.76 - 1.27 mg/dL    Sodium 136 136 - 145 mmol/L    Potassium 4.2 3.5 - 5.2 mmol/L    Chloride 100 98 - 107 mmol/L    CO2 23.0 22.0 - 29.0 mmol/L    Calcium 9.3 8.6 - 10.5 mg/dL    Total Protein 7.6 6.0 - 8.5 g/dL    Albumin 4.5 3.5 - 5.2 g/dL    ALT (SGPT) 10 1 - 41 U/L    AST (SGOT) 21 1 - 40 U/L    Alkaline Phosphatase 68 39 - 117 U/L    Total Bilirubin 1.0 0.0 - 1.2 mg/dL    Globulin 3.1 gm/dL    A/G Ratio 1.5 g/dL    BUN/Creatinine Ratio 20.8 7.0 - 25.0    Anion Gap 13.0 5.0 - 15.0 mmol/L    eGFR 71.4 >60.0 mL/min/1.73   CBC Auto Differential    Collection Time: 03/04/24 12:07 PM    Specimen: Blood   Result Value Ref Range    WBC 8.77 3.40 - 10.80 10*3/mm3    RBC 4.30 4.14 - 5.80 10*6/mm3    Hemoglobin 14.6 13.0 - 17.7 g/dL    Hematocrit 43.2 37.5 - 51.0 %    .5 (H) 79.0 - 97.0 fL    MCH 34.0 (H) 26.6 - 33.0 pg    MCHC 33.8 31.5 - 35.7 g/dL    RDW 14.7 12.3 - 15.4 %    RDW-SD 54.4 (H) 37.0 - 54.0 fl    MPV 10.5 6.0 - 12.0 fL    Platelets 173 140 - 450 10*3/mm3    Neutrophil % 81.0 (H) 42.7 - 76.0 %    Lymphocyte % 12.0 (L) 19.6 - 45.3 %    Monocyte % 6.0 5.0 - 12.0 %    Eosinophil % 0.2 (L) 0.3 - 6.2 %    Basophil % 0.3 0.0 - 1.5 %    Immature Grans % 0.5 0.0 - 0.5 %    Neutrophils, Absolute 7.10 (H) 1.70 - 7.00 10*3/mm3    Lymphocytes, Absolute 1.05 0.70 - 3.10 10*3/mm3    Monocytes, Absolute 0.53 0.10 - 0.90 10*3/mm3    Eosinophils, Absolute 0.02 0.00 - 0.40 10*3/mm3    Basophils, Absolute 0.03 0.00 - 0.20 10*3/mm3    Immature Grans, Absolute 0.04 0.00 - 0.05 10*3/mm3    nRBC 0.0 0.0 - 0.2 /100 WBC   Green Top (Gel)    Collection Time: 03/04/24 12:07 PM   Result Value Ref Range    Extra Tube Hold for add-ons.    Lavender Top    Collection Time: 03/04/24 12:07 PM   Result Value  Ref Range    Extra Tube hold for add-on    Gold Top - SST    Collection Time: 03/04/24 12:07 PM   Result Value Ref Range    Extra Tube Hold for add-ons.    Gray Top    Collection Time: 03/04/24 12:07 PM   Result Value Ref Range    Extra Tube Hold for add-ons.    Light Blue Top    Collection Time: 03/04/24 12:07 PM   Result Value Ref Range    Extra Tube Hold for add-ons.    POC CHEM 8    Collection Time: 03/04/24 12:19 PM    Specimen: Blood   Result Value Ref Range    Glucose 92 70 - 130 mg/dL    BUN 23 8 - 26 mg/dL    Creatinine 1.20 0.60 - 1.30 mg/dL    Sodium 141 138 - 146 mmol/L    POC Potassium 4.2 3.5 - 4.9 mmol/L    Chloride 104 98 - 109 mmol/L    Total CO2 25 24 - 29 mmol/L    Hemoglobin 14.6 12.0 - 17.0 g/dL    Hematocrit 43 38 - 51 %    Ionized Calcium 1.13 (L) 1.20 - 1.32 mmol/L    eGFR 61.5 >60.0 mL/min/1.73   POCT CHEM 8    Collection Time: 03/04/24  1:02 PM    Specimen: Blood   Result Value Ref Range    Sodium 138 138 - 146 mmol/L    POC Potassium 3.8 3.5 - 4.9 mmol/L    Chloride 100 98 - 109 mmol/L    Calcium, Arterial 1.17 mg/dL    Glucose 108 70 - 130 mg/dL    BUN 11 mg/dL    Creatinine 0.80 0.60 - 1.30 mg/dL    Hemoglobin 15.0 12.0 - 17.0 g/dL    Hematocrit 44 38 - 51 %     Note: In addition to lab results from this visit, the labs listed above may include labs taken at another facility or during a different encounter within the last 24 hours. Please correlate lab times with ED admission and discharge times for further clarification of the services performed during this visit.    CT Head Without Contrast   Final Result   Impression:   Brain atrophy with chronic small vessel ischemic changes      No acute intracranial abnormality            Electronically Signed: Kevin Mckeon MD     3/4/2024 1:08 PM EST     Workstation ID: [id number]      CT Chest Without Contrast Diagnostic   Final Result   Impression:      1. Multiple left lower rib fractures      2. Trace left pleural effusion      3. Diffuse  fusiform aneurysm of the thoracic aorta         CT abdomen pelvis: Lung Bases:     The visualized lung bases and lower mediastinal structures are unremarkable.      Liver:Liver is normal in size and CT density. No focal lesions.      Biliary/Gallbladder: There is a focal area of increased density at the gallbladder fundus (image 43 series 5). There is no biliary ductal rotation      Spleen:Spleen is normal in size and CT density.      Pancreas:    There is diffuse pancreatic atrophy      Kidneys: Kidneys are normal in size. There are no stones or hydronephrosis. Exophytic and cortical left renal cysts      Adrenals: Adrenal glands are unremarkable.      Retroperitoneal/Lymph Nodes/Vasculature: No retroperitoneal adenopathy is identified by size criteria. The abdominal aorta shows a normal diameter with diffuse atherosclerosis. There is fusiform aneurysm of the bilateral common iliac arteries measuring    2.3 cm on the right and 2.3 cm on the left      Gastrointestinal/Mesentery: The bowel loops are non-dilated without definite wall thickening or mass. The appendix appears within normal limits. No evidence of obstruction. No free air. There is colon diverticulosis most advanced in the sigmoid with no    pericolonic inflammatory changes      Bladder: The bladder is unremarkable      No acute abnormalities in the pelvis. Lower lumbar laminectomy changes are seen.      Impression:      Hepatic steatosis      Focal area of increased density at the gallbladder fundus which could represent adenomyomatosis but not conclusive. 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 Multiple left lower rib fractures      2. Trace left pleural effusion      3. Diffuse fusiform aneurysm of the thoracic aorta         CT abdomen pelvis: Lung Bases:     The visualized lung bases and lower mediastinal structures are unremarkable.      Liver:Liver is normal in size and CT density. No focal lesions.      Biliary/Gallbladder: There is a focal area of increased density at the gallbladder fundus (image 43 series 5). There is no biliary ductal rotation      Spleen:Spleen is normal in size and CT density.      Pancreas:    There is diffuse pancreatic atrophy      Kidneys: Kidneys are normal in size. There are no stones or hydronephrosis. Exophytic and cortical left renal cysts      Adrenals: Adrenal glands are unremarkable.      Retroperitoneal/Lymph Nodes/Vasculature: No retroperitoneal adenopathy is identified by size criteria. The abdominal aorta shows a normal diameter with diffuse atherosclerosis. There is fusiform aneurysm of the bilateral common iliac arteries measuring    2.3 cm on the right and 2.3 cm on the left      Gastrointestinal/Mesentery: The bowel loops are non-dilated without definite wall thickening or mass. The appendix appears within normal limits. No evidence of obstruction. No free air. There is colon diverticulosis most advanced in the sigmoid with no    pericolonic inflammatory changes      Bladder: The bladder is unremarkable      No acute abnormalities in the pelvis. Lower lumbar laminectomy changes are seen.      Impression:      Hepatic steatosis      Focal area of increased density at the gallbladder fundus which could represent adenomyomatosis but not conclusive. 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                     Electronically Signed: Kevin Mckeon MD      "3/4/2024 1:55 PM EST     Workstation ID: [id number]        Vitals:    03/04/24 1123   BP: 128/78   BP Location: Left arm   Patient Position: Sitting   Pulse: 102   Resp: 20   Temp: 97.3 °F (36.3 °C)   TempSrc: Oral   SpO2: 94%   Weight: 74.8 kg (165 lb)   Height: 177.8 cm (70\")     Medications   sodium chloride 0.9 % bolus 1,000 mL (0 mL Intravenous Stopped 3/4/24 1419)   iopamidol (ISOVUE-300) 61 % injection 100 mL (75 mL Intravenous Given 3/4/24 1258)     ECG/EMG Results (last 24 hours)       ** No results found for the last 24 hours. **          No orders to display                                      LINDA reviewed by Manan Milian MD       Medical Decision Making  Pt is a 79 yo male presenting to ED with complains of pain after a fall. Labs in ED notable for notable for WBC 8.7, Hgb 14.6, Hct 43.2, Cr 1.06, Glucose 91 and K 4.2. CT head no acute findings. CT chest with 6th, 7th and 8th rib fractures. Noted ascending aortic aneurysm without dissection and also density in gallbladder. Discussed results and need for close f/u with PCP for further evaluation and monitoring. He is agreeable with plan. Provided incentive spirometer.    Discussed patient with Dr. Milian who is agreeable with ED course and tx plan.     DDx  Fracture, Contusion, Pneumothorax, Spleen injury, Internal bleeding, SAH    Problems Addressed:  Aneurysm of ascending aorta without rupture: complicated acute illness or injury  Closed fracture of multiple ribs of left side, initial encounter: complicated acute illness or injury  Mass of gallbladder: complicated acute illness or injury    Amount and/or Complexity of Data Reviewed  External Data Reviewed: notes.     Details: Reviewed previous non ED visits including prior labs, imaging, available notes, medications, allergies and surgical hx.     Labs: ordered. Decision-making details documented in ED Course.  Radiology: ordered. Decision-making details documented in ED " Course.    Risk  Prescription drug management.        Final diagnoses:   Closed fracture of multiple ribs of left side, initial encounter   Aneurysm of ascending aorta without rupture   Mass of gallbladder       ED Disposition  ED Disposition       ED Disposition   Discharge    Condition   Stable    Comment   --               Shannan Rivera MD  [address] [facility] [address]  [place] [id number] [phone]    Schedule an appointment as soon as possible for a visit       [facility] EMERGENCY DEPARTMENT  [address] [facility] [address] [phone]    If symptoms worsen         Medication List        New Prescriptions      lidocaine 5 %  Commonly known as: LIDODERM  Place 1 patch on the skin as directed by provider Daily for 6 doses. Remove & Discard patch within 12 hours or as directed by MD            Changed      HYDROcodone-acetaminophen 5-325 MG per tablet  Commonly known as: NORCO  Take 1 tablet by mouth Every 8 (Eight) Hours As Needed for Moderate Pain for up to 15 doses.  What changed: when to take this               Where to Get Your Medications        These medications were sent to [facility]/pharmacy #[id number] - [place], [place] - [address]  - [phone] [facility] [phone] FX  [address] , [facility] [address]      Phone: [phone]   HYDROcodone-acetaminophen 5-325 MG per tablet  lidocaine 5 %            Rylie Mccall PA  03/04/24 1997

## 2024-03-07 ENCOUNTER — TELEPHONE (OUTPATIENT)
Dept: PAIN MEDICINE | Facility: CLINIC | Age: 79
End: 2024-03-07
Payer: MEDICARE

## 2024-03-07 NOTE — TELEPHONE ENCOUNTER
DATE 2/27/24     PSYCHOLOGIST Casey Piña PHD     RECOMMENDATIONS     2. Patient is considered appropriative candidate for a SCS at this time   3. Patient does not require psych treatment at this time

## 2024-03-11 ENCOUNTER — TELEPHONE (OUTPATIENT)
Dept: PAIN MEDICINE | Facility: CLINIC | Age: 79
End: 2024-03-11
Payer: MEDICARE

## 2024-03-11 NOTE — TELEPHONE ENCOUNTER
"DELETE AFTER REVIEWING: Telephone encounter to be sent to the clinical pool     Caller:     Relationship to patient:     Best call back number: [phone] (home)       Chief complaint: BACK    Type of visit: TRIAL RUN     Requested date: ASAP     If rescheduling, when is the original appointment: NA     Additional notes:PATIENT IS CALLING FOR SCHEDULING FOR 30 DAY TRIAL OF THE \"THING THEY ARE GOING TO PUT IN  MY BACK\"         PATIENT ALSO REPORTED  HE FELL AND BROKE THREE RIBS.         "

## 2024-03-15 ENCOUNTER — LAB (OUTPATIENT)
Dept: LAB | Facility: HOSPITAL | Age: 79
End: 2024-03-15
Payer: MEDICARE

## 2024-03-15 ENCOUNTER — PREP FOR SURGERY (OUTPATIENT)
Dept: PAIN MEDICINE | Facility: CLINIC | Age: 79
End: 2024-03-15
Payer: MEDICARE

## 2024-03-15 DIAGNOSIS — M62.84 SARCOPENIA: ICD-10-CM

## 2024-03-15 DIAGNOSIS — S22.42XA CLOSED FRACTURE OF MULTIPLE RIBS OF LEFT SIDE, INITIAL ENCOUNTER: ICD-10-CM

## 2024-03-15 DIAGNOSIS — Z01.812 PRE-PROCEDURE LAB EXAM: ICD-10-CM

## 2024-03-15 DIAGNOSIS — R26.9 GAIT DISTURBANCE: ICD-10-CM

## 2024-03-15 DIAGNOSIS — R53.81 PHYSICAL DECONDITIONING: ICD-10-CM

## 2024-03-15 DIAGNOSIS — I71.21 ANEURYSM OF ASCENDING AORTA WITHOUT RUPTURE: ICD-10-CM

## 2024-03-15 DIAGNOSIS — G62.9 POLYNEUROPATHY: ICD-10-CM

## 2024-03-15 DIAGNOSIS — S82.841A CLOSED BIMALLEOLAR FRACTURE OF RIGHT ANKLE, INITIAL ENCOUNTER: ICD-10-CM

## 2024-03-15 DIAGNOSIS — M96.1 LUMBAR POSTLAMINECTOMY SYNDROME: ICD-10-CM

## 2024-03-15 DIAGNOSIS — M51.37 DEGENERATION OF LUMBAR OR LUMBOSACRAL INTERVERTEBRAL DISC: ICD-10-CM

## 2024-03-15 DIAGNOSIS — M47.816 SPONDYLOSIS OF LUMBAR REGION WITHOUT MYELOPATHY OR RADICULOPATHY: ICD-10-CM

## 2024-03-15 DIAGNOSIS — M96.1 LUMBAR POSTLAMINECTOMY SYNDROME: Primary | ICD-10-CM

## 2024-03-15 DIAGNOSIS — M48.062 LUMBAR STENOSIS WITH NEUROGENIC CLAUDICATION: ICD-10-CM

## 2024-03-15 LAB
APTT PPP: 28.5 SECONDS (ref 22–39)
DEPRECATED RDW RBC AUTO: 48.3 FL (ref 37–54)
ERYTHROCYTE [DISTWIDTH] IN BLOOD BY AUTOMATED COUNT: 13.9 % (ref 12.3–15.4)
HCT VFR BLD AUTO: 41 % (ref 37.5–51)
HGB BLD-MCNC: 14 G/DL (ref 13–17.7)
INR PPP: 1.04 (ref 0.89–1.12)
MCH RBC QN AUTO: 32.9 PG (ref 26.6–33)
MCHC RBC AUTO-ENTMCNC: 34.1 G/DL (ref 31.5–35.7)
MCV RBC AUTO: 96.2 FL (ref 79–97)
PLATELET # BLD AUTO: 226 10*3/MM3 (ref 140–450)
PMV BLD AUTO: 10.3 FL (ref 6–12)
PROTHROMBIN TIME: 13.7 SECONDS (ref 12.2–14.5)
RBC # BLD AUTO: 4.26 10*6/MM3 (ref 4.14–5.8)
WBC NRBC COR # BLD AUTO: 6.29 10*3/MM3 (ref 3.4–10.8)

## 2024-03-15 PROCEDURE — 85027 COMPLETE CBC AUTOMATED: CPT

## 2024-03-15 PROCEDURE — 85610 PROTHROMBIN TIME: CPT

## 2024-03-15 PROCEDURE — 36415 COLL VENOUS BLD VENIPUNCTURE: CPT

## 2024-03-15 PROCEDURE — 85730 THROMBOPLASTIN TIME PARTIAL: CPT

## 2024-03-15 NOTE — H&P
"Chief Complaint: \"I want to move forward with the stimulator trial.\" I have back pain and pain and numbness from my knees down.\"      History of Present Illness: Mr. Chris Patel, 78 y.o. male originally referred by Dr. Herminio Clement in consultation for chronic intractable lower back pain. Chris Patel reported a longstanding history of chronic intractable lower back pain, which began without incident. Chris Patel is an established patient. In September of 2021, he underwent diagnostic and therapeutic bilateral L3-L4 transforaminal epidural steroid injections. Subsequently, on 10/18/2021, he underwent lumbar laminectomies with medial facetectomies and foraminotomies at L3-L4 and L4-L5 by Dr. Kenan Arevalo.  Chris Patel underwent neurosurgical follow-up with Ernestine Becerra PA-C on 06/10/2022, when patient was complaining of residual symptoms. Chris Patel was advised to follow-up with the neurosurgical team on an as-needed basis. Chris Patel did not return to the NS clinic for follow-up. Chris Patel was referred back to me by Teresa Coreas PA-C for Dr. Herminio Clement with [facility] in [facility] for treatment of persistent axial mechanical lower back pain. Specifically,  Dr. Herminio Clement requested consideration for diagnostic bilateral lumbar medial branch blocks and RFTC. If beneficial, Dr Clement would consider L3-S1 lumbar fusion. Chris Patel described chronic numbness from his knees down into his feet bilaterally in a stocking distribution, severe intolerance to standing and/or walking, balance difficulties and a history of several falls. He denied symptoms of cauda equina. He reported alleviation of symptoms with sitting or lying down. Chris Patel has a significant component of peripheral neuropathy given the numbness from his knees down that does not change with positions. Patient is no longer smoking but he continues to consume alcohol. Pain has progressed in intensity " "over the past years. MRI of the lumbar spine revealed laminectomy defects at L3-L4 and L4-L5. At L2-L3: facet hypertrophy, loss of disc height. No significant stenosis. L5-S1 Modic endplate changes.  Flexion and extension X-rays of the lumbar spine revealed no obvious instability. Chris Patel failed to obtain pain relief with surgical measures ans well as conservative measures including oral analgesics, opioids, topical analgesics, ice, heat, physical therapy (last visit within the past 18 months), physical therapist directed home exercise program HEP (ongoing), to name a few. He underwent a first set of diagnostic bilateral lumbar MBBs on 08/09/2023 (80-90% pain relief 9/10 to 1-2/10 lasting for about 12 hours), followed by a second set on 08/16/2023 (% pain relief and functional improvement lasting for 12-18 hours), followed by bilateral lumbar medial branch rhizotomies on 08/21/2023, from which he reports 100% ongoing pain relief and modest functional improvement. He is still complaining from symptoms related to lumbar postlaminectomy syndrome, painful peripheral polyneuropathy, and PAD. Chris Patel has completed psychological clearance on 02/27/2024 by Dr. Casey Piña PhD: \"Patient is considered appropriative candidate for a SCS at this time. Patient does not require psych treatment at this time.\" MRI of the thoracic spine w/o contrast on 02/08/2024 revealed mild spondylosis with minimal areas of disc bulge not resulting in significant associated spinal canal or neuroforaminal impingement. Patient fell a few weeks ago and suffered several rib fractures. My CMA discussed with him if he would be able to tolerate the prone position for completing his SCS trial and he stated that he would be fine. Patient has completed labs.   Pain Description: Constant bilateral leg pain from the knee down with intermittent exacerbation, described as burning, crushing, sharpand throbbing sensation.   Radiation of Pain: " The pain radiates from the knees down to his feet and it is associated with numbness and weakness in his legs  Pain intensity today: 4/10   Average pain intensity last week: 8/10  Pain intensity ranges from: 4/10 to 9/10  Aggravating factors: Pain increases with standing and walking. Patient describes neurogenic severe claudication. Patient does not use a cane or a walker  Alleviating factors: Pain decreases with sitting down, lying down  Associated Symptoms:   Patient reports pain, numbness and weakness from his knees down to his feet due to peripheral polyneuropathy   Patient denies any new bladder or bowel problems.   Patient reports difficulties with his balance and reports some recent falls.   Pain interferes with ADLs, general activities (ability to walk, stand, transition from different positions), and affects patient's quality of life  Pain does not interfere with sleep     Review of previous therapies and additional medical records:  Chris Patel has already failed the following measures, including:   Conservative Measures: Oral analgesics, opioids, topical analgesics, ice, heat, physical therapy (last visit within the past 18 months), physical therapist directed home exercise program HEP (ongoing)  Interventional Measures:   08/09/2023: First set of diagnostic bilateral lumbar MBBs: 80-90% pain relief 9/10 to 1-2/10 lasting for about 12 hours  08/16/2023: Second set of diagnostic bilateral lumbar MBBs: % pain relief and functional improvement lasting for 12-18 hours  08/21/2023: Bilateral lumbar medial branch rhizotomies: 100% ongoing pain relief and modest functional improvement.  Prior to lumbar surgery:  09/2021: Therapeutic bilateral L3-L4 transforaminal epidural steroid injections    07/19/2021: DxTx bilateral L3-L4 transforaminal epidural steroid injection   Surgical Measures:  10/18/2021: lumbar laminectomies with medial facetectomies and foraminotomies at L3-L4 and L4-L5 by Dr. Grayson  "Mickey.    Chris Patel underwent spine neurosurgical consultation with Teresa Coreas PA-C for Dr. Herminio Clement with [facility] in [facility] and was found to be a potential surgical candidate for PLIF L3-S1.  Chris Patel has completed psychological clearance on 02/27/2024 by Dr. Casey Piña PhD: \"Patient is considered appropriative candidate for a SCS at this time. Patient does not require psych treatment at this time.\"   Chris Patel presents with significant comorbidities including osteoarthritis, chronic kidney disease, hyperlipidemia, hearing loss, hypertension, psoriasis, tobacco abuse  In terms of current analgesics, Chris Patel takes: Gabapentin 300 mg BID. Patient also takes trazodone  I have reviewed Gabriel Report consistent with medication reconciliation.  SOAPP:  High Risk    PHQ-2 Depression Screening  Little interest or pleasure in doing things?     Feeling down, depressed, or hopeless?     PHQ-2 Total Score         Pain Self-Efficacy Questionnaire (PSEQ)  ITEM 08-08  2023 01-11  2024 03-15  2024      I can enjoy things despite the pain. 0 3 0      I can do most of the household chores (tidying up, washing dishes, etc), despite the pain. 6 4 2      I can socialize with my friends or family members as often as I used to do, despite the pain. 6 4 2      I can cope with my pain in most situations. 6 4 2      I can do some form of work, despite the pain (includes housework, paid, and unpaid work). 6 5 1      I can still do many of the things I enjoy doing, such as hobbies or leisure activity despite pain. 6 4 0      I can cope with my pain without medications. 6 4 0      I can accomplish most of my goals in life despite the pain. 6 3 0      I can live in a normal lifestyle, despite the pain. 6 2 0      I can gradually become more active, despite the pain. 5 4 2      TOTAL SCORE 53/60 37/60 9/60          Global Pain Scale 08-08  2023 01-11  2024 03-15  2024        Pain 20 " 12 22        Feelings 6 0 10        Clinical outcomes 12 3 18        Activities 20 10 22        GPS Total: 58 25 72          The [place] Back Pain Disability Scale   DATE 08-08  2023 01-11  2024 03-15  2024        Sleep through the night 3 1 4        Turn over in bed 0 0 3        Get out of bed 5 0 4        Make your bed 0 0 3        Put on socks (pantyhose) 1 0 3        Ride in a car 0 0 3        Sit in a chair for several hours 2 1 4        Stand up for 20-30 minutes 2 1 5        Climb one flight of stairs 2 1 5        Walk a few blocks (200-300 yards)  5 1 5        Walk several miles 5 2 5        Run one block (about 50 yards) 5 2 5        Take food out of the refrigerator 0 0 2        Reach up to high shelves 0 1 3        Move a chair 1 1 3        Pull or push heavy doors 1 1 3        Bend over to clean the bathtub 3 1 5        Throw a ball 0 1 5        Carry two bags of groceries 3 1 4        Lift and carry a heavy suitcase 5 1 5        Total score 43 16 79           Diagnostic Studies:  I have independently reviewed and interpreted the images with the patient and used the images and a tridimensional spine model to explain findings. I have also reviewed the reports.  PT 13.7 INR 1  PTT 28.5  MRI of the thoracic spine w/o contrast on 02/08/2024 revealed mild spondylosis with minimal areas of disc bulge not resulting in significant associated spinal canal or neuroforaminal impingement.   Flexion and extension X-rays fo the lumbar spine on 02/08/2024: No radiographic signs of instability.  MRI of the lumbar spine without contrast on 04/20/2023. Report. Patient did not bring the disc.Mild lumbar levoscoliosis.  Multilevel spondylosis with degenerative disc disease and facet hypertrophy at all lumbar levels.  Axial imaging:  T11-T12, T12-L1, L1-L2: No significant canal or foraminal stenosis  L2-L3: Slight retrolisthesis, disc bulge, facet hypertrophy.  Mild canal stenosis and mild foraminal stenosis  L3-L4: Evidence  of previous laminectomy.  Disc bulge.  Facet hypertrophy.  No significant canal stenosis.  Mild foraminal stenosis  L4-L5: Evidence of previous laminectomy.  Disc bulge.  Facet hypertrophy.  No significant canal stenosis.  Mild foraminal stenosis   L5-S1: Disc bulge, facet hypertrophy.  No significant canal stenosis.  Mild left foraminal stenosis  MRI of the cervical spine without contrast on 07/13/2023 revealed multilevel cervical spondylosis.  Axial imaging:  C2-C3: Facet hypertrophy, disc osteophyte complex. Mild canal stenosis.  C3-C4: Facet hypertrophy, disc osteophyte complex. Mild canal stenosis. Moderate bilateral foraminal stenosis  C4-C5: Facet hypertrophy, disc osteophyte complex. Mild canal stenosis. Moderate to severe bilateral foraminal stenosis  C5-C6: Disc osteophyte complex, facet hypertrophy. Mild canal stenosis.  Severe bilateral foraminal stenosis  C6-C7: Facet hypertrophy, disc osteophyte complex. Mild canal stenosis. Moderate bilateral foraminal stenosis    The following portions of the patient's history were reviewed and updated as appropriate: problem list, past medical history, past surgery history, social history, family history, medication reconciliation, and allergies    Review of Systems   HENT:  Positive for hearing loss.    Musculoskeletal:  Positive for back pain.   Neurological:  Positive for numbness.   All other systems reviewed and are negative.        Patient Active Problem List   Diagnosis    Lumbar stenosis with neurogenic claudication    Spondylosis of lumbar region without myelopathy or radiculopathy    Degeneration of lumbar or lumbosacral intervertebral disc    Physical deconditioning    Gait disturbance    FRANCISCO JAVIER (acute kidney injury)    Bilateral low back pain without sciatica    Body mass index (BMI) of 20 to 24    Cigarette smoker    Decreased GFR    Mixed hyperlipidemia    Hypertension, benign    Screening for depression    Lumbar postlaminectomy syndrome     "Polyneuropathy    Sarcopenia       Past Medical History:   Diagnosis Date    Arthritis     Back pain     Cancer     pre skin cancerous areas - few     Chronic kidney disease     \"weak kidneys\"    Full dentures     full top and partial at bottom     High cholesterol     Koi (hard of hearing)     bilat hearing aids     Hypertension     Psoriasis     Wears glasses     Wears hearing aid     bilat          Past Surgical History:   Procedure Laterality Date    HERNIA REPAIR      umbi;lical hernia - with mesh     LUMBAR LAMINECTOMY DISCECTOMY DECOMPRESSION N/A 10/18/2021    Procedure: LUMBAR LAMINECTOMY  L3-5;  Surgeon: Kenan Arevalo MD;  Location: [facility];  Service: Neurosurgery;  Laterality: N/A;         Family History   Family history unknown: Yes         Social History     Socioeconomic History    Marital status:    Tobacco Use    Smoking status: Former     Current packs/day: 0.00     Types: Cigarettes     Quit date:      Years since quittin.2    Smokeless tobacco: Never    Tobacco comments:     smoked on and off    Vaping Use    Vaping status: Never Used   Substance and Sexual Activity    Alcohol use: Yes     Alcohol/week: 7.0 standard drinks of alcohol     Types: 7 Shots of liquor per week     Comment: bourbon    Drug use: Never    Sexual activity: Defer           Current Outpatient Medications:     Alpha Lipoic Acid 200 MG capsule, Take 400 mg by mouth 3 (Three) Times a Day., Disp: 180 capsule, Rfl: 5    atorvastatin (LIPITOR) 40 MG tablet, Take 1 tablet by mouth every night at bedtime., Disp: , Rfl:     Dietary Management Product (Rheumate) capsule, Take 1 capsule by mouth Daily., Disp: 90 capsule, Rfl: 1    gabapentin (NEURONTIN) 300 MG capsule, Take 2 capsules by mouth Every Night., Disp: , Rfl:     HYDROcodone-acetaminophen (NORCO) 5-325 MG per tablet, Take 1 tablet by mouth Every 8 (Eight) Hours As Needed for Moderate Pain for up to 15 doses., Disp: 15 tablet, Rfl: 0    lisinopril " (PRINIVIL,ZESTRIL) 40 MG tablet, Take 1 tablet by mouth Daily., Disp: , Rfl:     Risankizumab-rzaa (SKYRIZI, 150 MG DOSE, SC), Inject 75 mg under the skin into the appropriate area as directed. Every 3 months-   Ld 8th, Disp: , Rfl:     sildenafil (VIAGRA) 100 MG tablet, Take 1 tablet by mouth Daily., Disp: , Rfl:     traZODone (DESYREL) 100 MG tablet, Take 1 tablet by mouth Daily., Disp: , Rfl:     vitamin B-6 (PYRIDOXINE) 100 MG tablet, Take 1 tablet by mouth Daily., Disp: 30 tablet, Rfl: 0      Allergies   Allergen Reactions    Penicillins Hives         There were no vitals taken for this visit.      Physical Exam:  Findings on 01/11/2024  Constitutional: Patient appears well-developed, well-nourished, well-hydrated, appears younger than stated age  HEENT: Head: Normocephalic and atraumatic  Eyes: Conjunctivae and lids are normal  Pupils: Equal, round, reactive to light  Peripheral vascular exam: Posterior tibialis: right 1+ and left 1+. Dorsalis pedis: right 1+ and left 1+. 1+ edema.   Musculoskeletal   Gait and station: Gait evaluation demonstrated shuffling, wide base. Unable to walk on heels, toes, tandem walking   Lumbar spine: Passive and active range of motion are appropriate for age and condition. Extension of the lumbar spine did not increase or reproduce pain. Lumbar facet joint loading maneuvers are negative.  Thomas test and Gaenslen's test: Negative   Piriformis maneuvers: Negative   Palpation of the bilateral ischial tuberosities: Unrevealing   Hip joints: The range of motion of the hip joints is almost full and without pain   Palpation of the bilateral greater trochanter: Unrevealing   Examination of the Iliotibial band: Unrevealing   Neurological:   Patient is alert and oriented to person, place, and time.   Speech: Normal.   Cortical function: Normal mental status.   Reflex Scores:  Right patellar: 1+  Left patellar: 1+  Right Achilles: 0+  Left Achilles: 0+  Motor strength: 5/5  Motor Tone:  Normal  Involuntary movements: None.   Superficial/Primitive Reflexes: Primitive reflexes were absent.   Right La: Absent  Left La: Absent  Right ankle clonus: Absent  Left ankle clonus: Absent   Babinsky:  Absent  Long tract signs: Negative. Straight leg raising test:  Negative. Femoral stretch sign: Negative.   Sensory exam: Intact to light touch, intact pain and temperature sensation, intact vibration sensation and normal proprioception, except for decreased vibration in a stocking distribution from his knees down.   Coordination: Normal finger to nose. Romberg's sign: Negative    Skin and subcutaneous tissue: Skin is warm and intact. No rash noted. No cyanosis.   Psychiatric: Judgment and insight: Normal. Recent and remote memory: Intact. Mood and affect: Normal.       ASSESSMENT:   1. Lumbar postlaminectomy syndrome    2. Lumbar stenosis with neurogenic claudication    3. Polyneuropathy    4. Degeneration of lumbar or lumbosacral intervertebral disc    5. Aneurysm of ascending aorta without rupture    6. Closed fracture of multiple ribs of left side, initial encounter    7. Spondylosis of lumbar region without myelopathy or radiculopathy    8. Closed bimalleolar fracture of right ankle, initial encounter    9. Sarcopenia    10. Gait disturbance    11. Physical deconditioning          PLAN/MEDICAL DECISION MAKING:  Mr. Chris Patel, 78 y.o. male originally referred by Dr. Herminio Clement in consultation for chronic intractable lower back pain. Chris Patel reported a longstanding history of chronic intractable lower back pain, which began without incident. Chris Patel is an established patient. In September of 2021, he underwent diagnostic and therapeutic bilateral L3-L4 transforaminal epidural steroid injections. Subsequently, on 10/18/2021, he underwent lumbar laminectomies with medial facetectomies and foraminotomies at L3-L4 and L4-L5 by Dr. Kenan Arevalo.  Chris Patel underwent neurosurgical  follow-up with Ernestine Becerra PA-C on 06/10/2022, when patient was complaining of residual symptoms. Chris Patel was advised to follow-up with the neurosurgical team on an as-needed basis. Chris Patel did not return to the NS clinic for follow-up. Chris Patel was referred back to me by Teresa Coreas PA-C for Dr. Herminio Clement with [facility] in [facility] for treatment of persistent axial mechanical lower back pain. Specifically,  Dr. Herminio Clement requested consideration for diagnostic bilateral lumbar medial branch blocks and RFTC. If beneficial, Dr Clement would consider L3-S1 lumbar fusion. Chris Patel described chronic numbness from his knees down into his feet bilaterally in a stocking distribution, severe intolerance to standing and/or walking, balance difficulties and a history of several falls. He denied symptoms of cauda equina. He reported alleviation of symptoms with sitting or lying down. Chris Patel has a significant component of peripheral neuropathy given the numbness from his knees down that does not change with positions. Patient is no longer smoking but he continues to consume alcohol. Pain has progressed in intensity over the past years. MRI of the lumbar spine revealed laminectomy defects at L3-L4 and L4-L5. At L2-L3: facet hypertrophy, loss of disc height. No significant stenosis. L5-S1 Modic endplate changes.  Flexion and extension X-rays of the lumbar spine revealed no obvious instability. Chris Patel failed to obtain pain relief with surgical measures ans well as conservative measures including oral analgesics, opioids, topical analgesics, ice, heat, physical therapy (last visit within the past 18 months), physical therapist directed home exercise program HEP (ongoing), to name a few. He underwent a first set of diagnostic bilateral lumbar MBBs on 08/09/2023 (80-90% pain relief 9/10 to 1-2/10 lasting for about 12 hours), followed by a second set on  "08/16/2023 (% pain relief and functional improvement lasting for 12-18 hours), followed by bilateral lumbar medial branch rhizotomies on 08/21/2023, from which he reports 100% ongoing pain relief and modest functional improvement. He is still complaining from symptoms related to lumbar postlaminectomy syndrome, painful peripheral polyneuropathy, and PAD. Chris Patel has completed psychological clearance on 02/27/2024 by Dr. Casey Piña PhD: \"Patient is considered appropriative candidate for a SCS at this time. Patient does not require psych treatment at this time.\" MRI of the thoracic spine w/o contrast on 02/08/2024 revealed mild spondylosis with minimal areas of disc bulge not resulting in significant associated spinal canal or neuroforaminal impingement. Patient fell a few weeks ago and suffered several rib fractures. My CMA discussed with him if he would be able to tolerate the prone position for completing his SCS trial and he stated that he would be fine. Patient has completed labs. A comprehensive evaluation including history and physical exam along with pertinent physiologic and functional assessment was performed. Patient presents with intractable pain due to the diagnoses listed above. Patient failed to respond to conservative modalities and previous surgical interventions, as referenced under HPI. Patient responded well to minimally invasive interventional pain management measures, as referenced from previous notes and under HPI. I have documented the impact of patient's moderate-to-severe pain contributing to significant impairment in daily activities, ADLs, and a negative impact on the patient's quality of life, as reflected on Global Pain Scale 72/100; The [place] Back Pain Disability Scale 79/100; Tinetti Gait & Balance Assessment Tool  (high risk for falls). I have reviewed pertinent supporting diagnostic studies of patient's chronic pain condition as well as all available pertinent medical " records to patient's chronic pain condition including previous therapies, as referenced above. PHQ-2 Depression Screening 0; Pain Self-Efficacy Questionnaire (PSEQ) 9/60.  I had a lengthy conversation with Chris HERNANDEZ Jorge regarding his chronic pain condition and potential therapeutic options including risks, benefits, alternative therapies, to name a few. Patient is ready to move forward with a spinal cord stimulator trial. Therefore, I have proposed the following plan:    1. Interventional pain management measures: Patient does not take blood thinners.   A. Treatment of Lumbar Postlaminectomy Syndrome/Peripheral polyneuropathy/PAD: Patient will be scheduled for a spinal cord stimulator trial with [employer]. Patient has received formal education from me including risks, benefits, and alternative treatments, as well as detailed information regarding the procedure and specific goals for the trial. Patient has also received didactic materials including educational booklets and DVDs on spinal cord stimulation therapies. We could also consider PNS, ReActiv8, ITT.   We have also discussed prospects of diagnostic and therapeutic bilateral L4-L5 transforaminal epidural steroid injections using the lowest effective dose of steroids, under C-arm fluoroscopic guidance, with the use of contrast dye (unless contraindicated) to confirm appropriate needle placement and spread of contrast dye. We may repeat therapeutic bilateral L4-L5 transforaminal epidural steroid injections depending on patient's outcome and following current guidelines: Epidurals will be limited to a maximum of 4 sessions per spinal region in a rolling twelve (12) month period. Continuation of epidural steroid injections over 12 months would only be considered under the following provisions;  Patient is a high-risk surgical candidate, or the patient does not desire surgery, or recurrence of pain in the same location relieved with ESIs for at least three months  and epidural provides at least 50% sustained improvement of pain and/or 50% objective improvement in function (using same scale as baseline)  Pain is severe enough to cause a significant degree of functional disability or vocational disability  The primary care provider will be notified regarding continuation of procedures and repeat steroid use   B. Treatment of Chronic Axial Mechanical Lower Back Pain: If patient experiences 50% or more pain relief and functional improvement for at least six months (he has reached that goal), we could repeat the procedure. If more than 2 years elapse since last RFTC, then, the patient will be required to undergo diagnostic blocks prior to repeating RFTC.     2. Diagnostic studies: EMG/NCV of the bilateral lower extremities     3. Pharmacological measures: Reviewed and discussed;   A. Patient takes gabapentin. Patient also takes trazodone  B. Trial with Rheumate one tablet once daily  C. Start pyridoxine 100 mg one tablet by mouth daily take for 30 days, #30, no refills  D. Start alpha lipoid acid [id number] mg per day divided into 3 doses    4. Long-term rehabilitation efforts:  A. The patient has a history of falls. I did complete a risk assessment for falls. Fall precautions: Patient has been instructed regarding universal fall precautions, such as;   Using gait aids a cane or a walker at the appropriate height at all times for ambulation   Removing all area rugs and coffee tables to create a safe environment at home  Ensure clean, dry floors  Wearing supportive footwear and properly fitting clothing  Ensure bed/chair is appropriate height and patient's feet can touch the floor  Using a shower transfer bench  Using walk-in shower and having shower safety bars installed  Ensure proper lighting, minimize glare  Have nightlights operational and in use  Participation in an exercise program for gait training, balance training and strength  Avoid carrying laundry up and down  steps  Ensure proper compliance and organization of medications to avoid errors   Avoid use of over the counter sedatives and alcohol consumption  Ensure easy access to call bell, glasses, TV control, telephone  Ensure glasses/hearing aids are in use or close by (on top of night table)  B. [facility], core strengthening, gait and balance training, neurodynamics, E-STIM, myofascial release, cupping, dry needling, home exercise program  C. Start an exercise program such as yoga and water therapy  D. I have spent a significant amount of time talking with the patient and providing specific recommendations regarding lifestyle modification, preventive measures, and self-care management of chronic pain.  In addition, I have provided a copy of the booklet Care of the back by Will Weldon MD and Griselda Castellano MD to be used as a physician supervised home exercise program  E. Contrast therapy: Apply ice-packs for 15-20 minutes, followed by heating pads for 15-20 minutes to affected area   F.  Chris Patel  reports that he quit smoking about 5 years ago. His smoking use included cigarettes. He has never used smokeless tobacco.    5. The patient has been instructed to contact my office with any questions or difficulties. The patient understands the plan and agrees to proceed accordingly.    The patient has a documented plan of care to address chronic pain. Chris Patel reports a pain score of 8/10.  Given his pain assessment as noted, treatment options were discussed and the following options were decided upon as a follow-up plan to address the patient's pain: continuation of current treatment plan for pain, educational materials on pain management, home exercises and therapy, prescription for non-opiod analgesics, referral to Physical Therapy, referral to specialist for assistance in pain treatment guidance, steroid injections, use of  non-medical modalities (ice, heat, stretching and/or behavior modifications), and interventional pain management measures including neuromodulation techniques .              Pain Management Panel           No data to display                 LINDA query complete. LINDA reviewed by Akin Rodriguez MD.     Pain Medications               gabapentin (NEURONTIN) 300 MG capsule Take 2 capsules by mouth Every Night.    HYDROcodone-acetaminophen (NORCO) 5-325 MG per tablet Take 1 tablet by mouth Every 8 (Eight) Hours As Needed for Moderate Pain for up to 15 doses.    traZODone (DESYREL) 100 MG tablet Take 1 tablet by mouth Daily.             No orders of the defined types were placed in this encounter.    Please note that portions of this note were completed with a voice recognition program.   Any copied data in any portion of my note has been reviewed by myself and accurate.     The 21st Century Cures Act makes medical notes like this available to patients in the interest of transparency. This is a medical document intended as peer to peer communication. It is written in medical language and may contain abbreviations or verbiage that are unfamiliar. It may appear blunt or direct. Medical documents are intended to carry relevant information, facts as evident, and the clinical opinion of the practitioner.     Akin Rodriguez MD    Patient Care Team:  Shannan Rivera MD as PCP - General (Internal Medicine & Pediatrics)  Shannan Rivear MD as Referring Physician (Internal Medicine & Pediatrics)  Akin Rodriguez MD as Consulting Physician (Pain Medicine)     No orders of the defined types were placed in this encounter.        No future appointments.

## 2024-03-15 NOTE — TELEPHONE ENCOUNTER
S/w patient and asked if he would be able to handle completing the trail with his rib pain. Patient states that he would be fine.     Patient will complete labs prior to Monday's procedure. He is scheduled for Monday with arrival at 7:00 AM. Patient understands that he will need a  and NPO after midnight the night before.

## 2024-03-19 ENCOUNTER — TELEPHONE (OUTPATIENT)
Dept: PAIN MEDICINE | Facility: CLINIC | Age: 79
End: 2024-03-19
Payer: MEDICARE

## 2024-03-19 NOTE — TELEPHONE ENCOUNTER
Spoke with pt regarding how they're doing after their SCS Trial procedure 3/18/2024. Pt advised they are having no complications, or side effects.     Pt had 5/10 prior procedure, 0/10 post procedure. Pt rates pain 0/10 today at rest, and about a 5/10 when ambulating.    Advised pt of follow up Thursday with MARSHALL Wyatt at 0800.     Pt verbalized understanding, no further needs expressed.

## 2024-03-21 ENCOUNTER — OFFICE VISIT (OUTPATIENT)
Dept: PAIN MEDICINE | Facility: CLINIC | Age: 79
End: 2024-03-21
Payer: MEDICARE

## 2024-03-21 VITALS — WEIGHT: 171 LBS | HEIGHT: 70 IN | BODY MASS INDEX: 24.48 KG/M2

## 2024-03-21 DIAGNOSIS — G62.9 POLYNEUROPATHY: ICD-10-CM

## 2024-03-21 DIAGNOSIS — M51.37 DEGENERATION OF LUMBAR OR LUMBOSACRAL INTERVERTEBRAL DISC: ICD-10-CM

## 2024-03-21 DIAGNOSIS — M47.816 SPONDYLOSIS OF LUMBAR REGION WITHOUT MYELOPATHY OR RADICULOPATHY: ICD-10-CM

## 2024-03-21 DIAGNOSIS — R53.81 PHYSICAL DECONDITIONING: ICD-10-CM

## 2024-03-21 DIAGNOSIS — R26.9 GAIT DISTURBANCE: ICD-10-CM

## 2024-03-21 DIAGNOSIS — M48.062 LUMBAR STENOSIS WITH NEUROGENIC CLAUDICATION: ICD-10-CM

## 2024-03-21 DIAGNOSIS — M62.84 SARCOPENIA: ICD-10-CM

## 2024-03-21 DIAGNOSIS — M96.1 LUMBAR POSTLAMINECTOMY SYNDROME: ICD-10-CM

## 2024-03-21 DIAGNOSIS — Z46.2 ENCOUNTER FOR FITTING OR ADJUSTMENT OF NEUROPACEMAKER: ICD-10-CM

## 2024-03-21 PROCEDURE — 99214 OFFICE O/P EST MOD 30 MIN: CPT | Performed by: NURSE PRACTITIONER

## 2024-03-21 PROCEDURE — 1159F MED LIST DOCD IN RCRD: CPT | Performed by: NURSE PRACTITIONER

## 2024-03-21 PROCEDURE — 1125F AMNT PAIN NOTED PAIN PRSNT: CPT | Performed by: NURSE PRACTITIONER

## 2024-03-21 PROCEDURE — 1160F RVW MEDS BY RX/DR IN RCRD: CPT | Performed by: NURSE PRACTITIONER

## 2024-03-21 NOTE — PROGRESS NOTES
"Chief Complaint: \"I did very well during the stimulator trial\"      Brief History: Mr. Chris Patel is a 78 y.o. male, who returns to the clinic for possible spinal cord stimulator reprogramming and removal of spinal cord stimulator trial leads placed on 03/18/2024. Mr. Chris Patel reports 50% relief of his chronic lower back, lower extremity and diabetic neuropathic pain along with remarkable functional improvement with the use of his stimulator device.  Patient reports significant relief of his previously severe neurogenic claudication. In addition, patient reports improvement of his nocturnal pain with the use of the SCS device.  Chris Patel required of SCS reprogramming during the trial. Patient was able to operate his stimulator device without difficulties.  Patient did not take additional analgesics throughout his spinal cord stimulator trial. He has remained afebrile throughout the trial. Dressings are dry and intact. Patient denies any complications related to the procedure. Patient is very satisfied with the trial and would like to move forward with implantation of a spinal cord stimulator device.   Pain level is rated as 0/10 with the stimulator \"turned on.”   Patient level ranges from 0/10 to 5/10 with the use of the spinal cord stimulator.    Patient denies  pain, numbness, and weakness in the lower extremities.  Patient denies  any new bladder or bowel problems.     Pain History:  Mr. Chris Patel, 78 y.o. male originally referred by Dr. Herminio Hernandes in consultation of chronic intractable lower back pain.  He reports a longstanding history of chronic intractable lower back pain.  He previously undergone epidural injections in September 2021, although were not long sustaining, therefore on October 18, 2021 he underwent lumbar laminectomies with medial facetectomies and foraminotomies at L3-L4 and L4-L5 by Dr. Arevalo.  By June 2022, he saw neurosurgery and was complaining of residual symptoms.  " He was seen by [facility]. Emerson with [facility] for treatment of persistent axial mechanical lower back pain.  Dr. Clement requested consideration for medial branch blocks and RFA, if beneficial Dr. Clement would consider an L3-S1 lumbar fusion.  An MRI of the lumbar spine revealed postsurgical change from prior laminectomies at L3-L4 and L4-L5.  At L2-L3 there is facet hypertrophy with loss of disc height, no significant spinal or neuroforaminal stenosis.  At L5-S1 there is Modic endplate changes.  On August 21, 2023 he underwent bilateral lumbar medial branch rhizotomies, from which he has reported 100% pain relief with modest functional improvement.  He was still complaining from symptoms related to lumbar postlaminectomy syndrome, painful peripheral polyneuropathy and PAD. He has failed to obtain pain relief with surgical measures ans well as conservative measures including oral analgesics, opioids, topical analgesics, ice, heat, physical therapy, physical therapist directed home exercise program HEP (ongoing), to name a few.   Pain Description: Constant bilateral leg pain from the knee down with intermittent exacerbation, described as burning, crushing, sharpand throbbing sensation.   Radiation of Pain: The pain radiates from the knees down to his feet and it is associated with numbness and weakness in his legs  Pain intensity today: 0/10   Average pain intensity last week: 6/10  Pain intensity ranges from: 0/10 to 9/10  Aggravating factors: Pain increases with standing and walking. Patient describes neurogenic severe claudication. Patient does not use a cane or a walker  Alleviating factors: Pain decreases with sitting down, lying down  Associated Symptoms:   Patient reports pain, numbness and weakness from his knees down to his feet due to peripheral polyneuropathy   Patient denies any new bladder or bowel problems.   Patient reports difficulties with his balance and reports  "some recent falls.   Pain interferes with ADLs, general activities (ability to walk, stand, transition from different positions), and affects patient's quality of life  Pain does not interfere with sleep     Review of previous therapies and additional medical records:  Chris Patel has already failed the following measures, including:   Conservative Measures: Oral analgesics, opioids, topical analgesics, ice, heat, physical therapy, physical therapist directed home exercise program HEP (ongoing)  Interventional Measures:   08/09/2023: First set of diagnostic bilateral lumbar MBBs: 80-90% pain relief 9/10 to 1-2/10 lasting for about 12 hours  08/16/2023: Second set of diagnostic bilateral lumbar MBBs: % pain relief and functional improvement lasting for 12-18 hours  08/21/2023: Bilateral lumbar medial branch rhizotomies: 100% ongoing pain relief and modest functional improvement.  Prior to lumbar surgery:  09/2021: Therapeutic bilateral L3-L4 transforaminal epidural steroid injections    07/19/2021: DxTx bilateral L3-L4 transforaminal epidural steroid injection   Surgical Measures:  10/18/2021: lumbar laminectomies with medial facetectomies and foraminotomies at L3-L4 and L4-L5 by Dr. Kenan Arevalo.   [facility] psychological evaluation with Dr. Casey Piña on 2/26/2024, per note: \"From a psychological perspective, patient is considered to be an appropriate candidate for spinal cord stimulator at this time.\"  Chris Patel underwent spine neurosurgical consultation with Teresa Coreas PA-C for Dr. Herminio Clement with [facility] and was found to be a potential surgical candidate for PLIF L3-S1.  Chris Patel presents with significant comorbidities including osteoarthritis, chronic kidney disease, hyperlipidemia, hearing loss, hypertension, psoriasis, tobacco abuse  In terms of current analgesics, Chris Patel takes: Gabapentin. Patient also takes trazodone  I have reviewed " Gabriel Report consistent with medication reconciliation.  SOAPP:  High Risk           Global Pain Scale 08-08 2023 01-11 2024               Pain 20 12               Feelings 6 0               Clinical outcomes 12 3               Activities 20 10               GPS Total: 58 25                 The [place] Back Pain Disability Scale   DATE 08-08 2023 01-11 2024               Sleep through the night 3 1               Turn over in bed 0 0               Get out of bed 5 0               Make your bed 0 0               Put on socks (pantyhose) 1 0               Ride in a car 0 0               Sit in a chair for several hours 2 1               Stand up for 20-30 minutes 2 1               Climb one flight of stairs 2 1               Walk a few blocks (200-300 yards)  5 1               Walk several miles 5 2               Run one block (about 50 yards) 5 2               Take food out of the refrigerator 0 0               Reach up to high shelves 0 1               Move a chair 1 1               Pull or push heavy doors 1 1               Bend over to clean the bathtub 3 1               Throw a ball 0 1               Carry two bags of groceries 3 1               Lift and carry a heavy suitcase 5 1               Total score 43 16                    Review of New Diagnostic Studies:   MRI of the thoracic spine without contrast 2/8/2024: Mild spondylosis, no significant spinal canal neuroforaminal stenosis.  Lumbar spine x-rays flexion-extension views 2/8/2024: Diffuse degenerative disc disease with lower lumbar facet arthritis.  No significant listhesis or instability.    Previous Diagnostic Studies:   MRI of the cervical spine without contrast 07/13/2023: multilevel cervical spondylosis.   C2-C3: Facet hypertrophy, disc osteophyte complex. Mild canal stenosis.  C3-C4: Facet hypertrophy, disc osteophyte complex. Mild canal stenosis. Moderate bilateral foraminal stenosis  C4-C5: Facet hypertrophy, disc osteophyte complex. Mild  "canal stenosis. Moderate to severe bilateral foraminal stenosis  C5-C6: Disc osteophyte complex, facet hypertrophy. Mild canal stenosis.  Severe bilateral foraminal stenosis  C6-C7: Facet hypertrophy, disc osteophyte complex. Mild canal stenosis. Moderate bilateral foraminal stenosis  MRI of the lumbar spine without contrast 04/20/2023:  Multilevel spondylosis with degenerative disc disease and facet hypertrophy at all lumbar levels.   T11-T12, T12-L1, L1-L2: No significant canal or foraminal stenosis  L2-L3: Slight retrolisthesis, disc bulge, facet hypertrophy.  Mild canal stenosis and mild foraminal stenosis  L3-L4: Evidence of previous laminectomy.  Disc bulge.  Facet hypertrophy.  No significant canal stenosis.  Mild foraminal stenosis  L4-L5: Evidence of previous laminectomy.  Disc bulge.  Facet hypertrophy.  No significant canal stenosis.  Mild foraminal stenosis   L5-S1: Disc bulge, facet hypertrophy.  No significant canal stenosis.  Mild left foraminal stenosis    The following portions of the patient's history were reviewed and updated as appropriate: problem list, past medical history, past surgery history, social history, family history, medications, and allergies    Review of Systems      Patient Active Problem List   Diagnosis    Lumbar stenosis with neurogenic claudication    Spondylosis of lumbar region without myelopathy or radiculopathy    Degeneration of lumbar or lumbosacral intervertebral disc    Physical deconditioning    Gait disturbance    FRANCISCO JAVIER (acute kidney injury)    Bilateral low back pain without sciatica    Body mass index (BMI) of 20 to 24    Cigarette smoker    Decreased GFR    Mixed hyperlipidemia    Hypertension, benign    Screening for depression    Lumbar postlaminectomy syndrome    Polyneuropathy    Sarcopenia       Past Medical History:   Diagnosis Date    Arthritis     Back pain     Cancer     pre skin cancerous areas - few     Chronic kidney disease     \"weak kidneys\"    Full " dentures     full top and partial at bottom     High cholesterol     [place] (hard of hearing)     bilat hearing aids     Hypertension     Psoriasis     Wears glasses     Wears hearing aid     bilat          Past Surgical History:   Procedure Laterality Date    HERNIA REPAIR      umbi;lical hernia - with mesh     LUMBAR LAMINECTOMY DISCECTOMY DECOMPRESSION N/A 10/18/2021    Procedure: LUMBAR LAMINECTOMY  L3-5;  Surgeon: Kenan Arevalo MD;  Location: [facility];  Service: Neurosurgery;  Laterality: N/A;         Family History   Family history unknown: Yes         Social History     Socioeconomic History    Marital status:    Tobacco Use    Smoking status: Former     Current packs/day: 0.00     Types: Cigarettes     Quit date:      Years since quittin.2    Smokeless tobacco: Never    Tobacco comments:     smoked on and off    Vaping Use    Vaping status: Never Used   Substance and Sexual Activity    Alcohol use: Yes     Alcohol/week: 7.0 standard drinks of alcohol     Types: 7 Shots of liquor per week     Comment: bourbon    Drug use: Never    Sexual activity: Defer           Current Outpatient Medications:     Alpha Lipoic Acid 200 MG capsule, Take 400 mg by mouth 3 (Three) Times a Day., Disp: 180 capsule, Rfl: 5    atorvastatin (LIPITOR) 40 MG tablet, Take 1 tablet by mouth every night at bedtime., Disp: , Rfl:     Dietary Management Product (Rheumate) capsule, Take 1 capsule by mouth Daily., Disp: 90 capsule, Rfl: 1    gabapentin (NEURONTIN) 300 MG capsule, Take 2 capsules by mouth Every Night., Disp: , Rfl:     HYDROcodone-acetaminophen (NORCO) 5-325 MG per tablet, Take 1 tablet by mouth Every 8 (Eight) Hours As Needed for Moderate Pain for up to 15 doses., Disp: 15 tablet, Rfl: 0    lisinopril (PRINIVIL,ZESTRIL) 40 MG tablet, Take 1 tablet by mouth Daily., Disp: , Rfl:     Risankizumab-rzaa (SKYRIZI, 150 MG DOSE, SC), Inject 75 mg under the skin into the appropriate area as directed. Every 3 months-  "  Ld 8th, Disp: , Rfl:     sildenafil (VIAGRA) 100 MG tablet, Take 1 tablet by mouth Daily., Disp: , Rfl:     traZODone (DESYREL) 100 MG tablet, Take 1 tablet by mouth Daily., Disp: , Rfl:     vitamin B-6 (PYRIDOXINE) 100 MG tablet, Take 1 tablet by mouth Daily., Disp: 30 tablet, Rfl: 0      Allergies   Allergen Reactions    Penicillins Hives         Ht 177.8 cm (70\")   Wt 77.6 kg (171 lb)   BMI 24.54 kg/m²       Physical Exam   Constitutional: Patient appears well-developed, well-nourished, well-hydrated  HEENT: Head: Normocephalic and atraumatic  Eyes: Conjunctivae and lids are normal  Pupils: Equal, round, reactive to light  Musculoskeletal   Gait and station: Gait evaluation demonstrated shuffling   Neurological:   Patient is alert and oriented to person, place, and time.   Speech: Normal.   Cortical function: Normal mental status.   Motor strength:  5/5  Motor Tone: Normal .   Involuntary movements: None.   Skin and subcutaneous tissue: Skin is warm and intact. No rash noted. No cyanosis. Psychiatric: Judgment and insight: Normal. Recent and remote memory: Intact. Mood and affect: Normal.   The stimulator placement site looks unremarkable without erythema, drainage or fluid accumulation  Psychiatric: Judgment and insight: Normal. Recent and remote memory: Intact. Mood and affect: Normal.     PROCEDURES:   Procedure #1: Analysis of the spinal cord stimulator device  without spinal cord stimulator reprogramming  Patient used the Medtronic spinal cord stimulator device 100 % of the time since initiation of the trial phase.     Program B1 (Preferred Program):    Electrode polarities: 9-, 11+   Amplitude: 4.5 mA     Pulse width: 200 mcs  Rate: 50 Hz      Procedure #2: Removal of spinal cord stimulator trial leads.   Two leads were removed with tips intact. There is no erythema, drainage, or fluid accumulation at the site. The area was cleansed with chlorhexidine.  A small Covaderm was applied.     ASSESSMENT:   1. " Lumbar postlaminectomy syndrome    2. Lumbar stenosis with neurogenic claudication    3. Polyneuropathy    4. Degeneration of lumbar or lumbosacral intervertebral disc    5. Spondylosis of lumbar region without myelopathy or radiculopathy    6. Sarcopenia    7. Gait disturbance    8. Physical deconditioning    9. Encounter for fitting or adjustment of neuropacemaker        PLAN/MEDICAL DECISION MAKING:  Mr. Chris Patel, 78 y.o. male originally referred by Dr. Herminio Hernandes in consultation of chronic intractable lower back pain.  He reports a longstanding history of chronic intractable lower back pain.  He previously undergone epidural injections in September 2021, although were not long sustaining, therefore on October 18, 2021 he underwent lumbar laminectomies with medial facetectomies and foraminotomies at L3-L4 and L4-L5 by Dr. Arevalo.  By June 2022, he saw neurosurgery and was complaining of residual symptoms.  He was seen by Dr. Emerson with [facility] in [facility] for treatment of persistent axial mechanical lower back pain.  Dr. Clement requested consideration for medial branch blocks and RFA, if beneficial Dr. Clement would consider an L3-S1 lumbar fusion.  An MRI of the lumbar spine revealed postsurgical change from prior laminectomies at L3-L4 and L4-L5.  At L2-L3 there is facet hypertrophy with loss of disc height, no significant spinal or neuroforaminal stenosis.  At L5-S1 there is Modic endplate changes.  On August 21, 2023 he underwent bilateral lumbar medial branch rhizotomies, from which he has reported 100% pain relief with modest functional improvement.  He was still complaining from symptoms related to lumbar postlaminectomy syndrome, painful peripheral polyneuropathy and PAD. He has failed to obtain pain relief with surgical measures ans well as conservative measures including oral analgesics, opioids, topical analgesics, ice, heat, physical therapy, physical therapist  directed home exercise program HEP (ongoing), to name a few.  Patient is very satisfied with the trial and would like to move forward with implantation of a spinal cord stimulator device. I had a lengthy conversation with Mr. Chris Patel regarding his chronic pain condition and potential therapeutic options including risks, benefits, alternative therapies, to name a few. Therefore, I have proposed the following plan:  1.  Patient will be scheduled for implantation of a spinal cord stimulator device with Medtronic. I have recommended two Medtronic Bungles Jungles compact SureScan MRI (MRI full body in 1.5 Monique) with the top electrodes projecting at the level of the superior endplate of the T7 vertebral level. I have recommended Medtronic LifeShield Intellis with AdaptiveStim (rechargeable):  Full body MRI compatible up to 1.5 Monique  2. Recommendations for infection control prior to surgery, as follows:  Start over-the-counter Hibiclens showers daily 5 days before surgery  Chlorhexidine towelettes (given at time of Pre-Admission Testing appointment) night before and day of surgery  Routine cephalosporin surgical prophylaxis  Patient will wear an abdominal binder for 2-4 weeks after surgery.   I have ordered a back brace for postsurgical care. Patient is going to wear a rigid LSO for a period of at least 6 weeks to facilitate healing and reduce movement following surgery.    Patient has been prescribed a rigid LSO to wear after surgery for a period of at least 6 weeks to facilitate healing. The brace will limit motion to stabilize patient following surgery.    MedNuzzel TYRX Neuro absorbable antibacterial envelope MRI safe. The absorbable polymer coating contains the antimicrobial agents rifampin and minocycline in concentration of 8 mg rifampin and 5.1 minocycline (medium size: 6.3 cm x 6.8 cm) and 11.9 mg rifampin and 7.6 mg minocycline for the large size (7.4 cm x 8.5 cm).    3. The patient has been instructed to contact  my office with any questions or difficulties. The patient understands the plan and agrees to proceed accordingly.        Pain Medications               gabapentin (NEURONTIN) 300 MG capsule Take 2 capsules by mouth Every Night.    HYDROcodone-acetaminophen (NORCO) 5-325 MG per tablet Take 1 tablet by mouth Every 8 (Eight) Hours As Needed for Moderate Pain for up to 15 doses.    traZODone (DESYREL) 100 MG tablet Take 1 tablet by mouth Daily.             No orders of the defined types were placed in this encounter.       No orders of the defined types were placed in this encounter.        No future appointments.         Please note that portions of this note were completed with a voice recognition program.     MARSHALL Donovan    Patient Care Team:  Shannan Rivera MD as PCP - General (Internal Medicine & Pediatrics)  Shannan Rivera MD as Referring Physician (Internal Medicine & Pediatrics)  Akin Rodriguez MD as Consulting Physician (Pain Medicine)

## 2024-03-21 NOTE — H&P (VIEW-ONLY)
"Chief Complaint: \"I did very well during the stimulator trial\"      Brief History: Mr. Chris Patel is a 78 y.o. male, who returns to the clinic for possible spinal cord stimulator reprogramming and removal of spinal cord stimulator trial leads placed on 03/18/2024. Mr. Chris Patel reports 50% relief of his chronic lower back, lower extremity and diabetic neuropathic pain along with remarkable functional improvement with the use of his stimulator device.  Patient reports significant relief of his previously severe neurogenic claudication. In addition, patient reports improvement of his nocturnal pain with the use of the SCS device.  Chris Patel required of SCS reprogramming during the trial. Patient was able to operate his stimulator device without difficulties.  Patient did not take additional analgesics throughout his spinal cord stimulator trial. He has remained afebrile throughout the trial. Dressings are dry and intact. Patient denies any complications related to the procedure. Patient is very satisfied with the trial and would like to move forward with implantation of a spinal cord stimulator device.   Pain level is rated as 0/10 with the stimulator \"turned on.”   Patient level ranges from 0/10 to 5/10 with the use of the spinal cord stimulator.    Patient denies  pain, numbness, and weakness in the lower extremities.  Patient denies  any new bladder or bowel problems.     Pain History:  Mr. Chris Patel, 78 y.o. male originally referred by Dr. Herminio Hernandes in consultation of chronic intractable lower back pain.  He reports a longstanding history of chronic intractable lower back pain.  He previously undergone epidural injections in September 2021, although were not long sustaining, therefore on October 18, 2021 he underwent lumbar laminectomies with medial facetectomies and foraminotomies at L3-L4 and L4-L5 by Dr. Arevalo.  By June 2022, he saw neurosurgery and was complaining of residual symptoms.  " He was [facility] by [facility]. [facility] with [facility] for treatment of persistent axial mechanical lower back pain.  Dr. Clement requested consideration for medial branch blocks and RFA, if beneficial Dr. Clement would consider an L3-S1 lumbar fusion.  An MRI of the lumbar spine revealed postsurgical change from prior laminectomies at L3-L4 and L4-L5.  At L2-L3 there is facet hypertrophy with loss of disc height, no significant spinal or neuroforaminal stenosis.  At L5-S1 there is Modic endplate changes.  On August 21, 2023 he underwent bilateral lumbar medial branch rhizotomies, from which he has reported 100% pain relief with modest functional improvement.  He was still complaining from symptoms related to lumbar postlaminectomy syndrome, painful peripheral polyneuropathy and PAD. He has failed to obtain pain relief with surgical measures ans well as conservative measures including oral analgesics, opioids, topical analgesics, ice, heat, physical therapy, physical therapist directed home exercise program HEP (ongoing), to name a few.   Pain Description: Constant bilateral leg pain from the knee down with intermittent exacerbation, described as burning, crushing, sharpand throbbing sensation.   Radiation of Pain: The pain radiates from the knees down to his feet and it is associated with numbness and weakness in his legs  Pain intensity today: 0/10   Average pain intensity last week: 6/10  Pain intensity ranges from: 0/10 to 9/10  Aggravating factors: Pain increases with standing and walking. Patient describes neurogenic severe claudication. Patient does not use a cane or a walker  Alleviating factors: Pain decreases with sitting down, lying down  Associated Symptoms:   Patient reports pain, numbness and weakness from his knees down to his feet due to peripheral polyneuropathy   Patient denies any new bladder or bowel problems.   Patient reports difficulties with his balance and reports  "some recent falls.   Pain interferes with ADLs, general activities (ability to walk, stand, transition from different positions), and affects patient's quality of life  Pain does not interfere with sleep     Review of previous therapies and additional medical records:  Chris Patel has already failed the following measures, including:   Conservative Measures: Oral analgesics, opioids, topical analgesics, ice, heat, physical therapy, physical therapist directed home exercise program HEP (ongoing)  Interventional Measures:   08/09/2023: First set of diagnostic bilateral lumbar MBBs: 80-90% pain relief 9/10 to 1-2/10 lasting for about 12 hours  08/16/2023: Second set of diagnostic bilateral lumbar MBBs: % pain relief and functional improvement lasting for 12-18 hours  08/21/2023: Bilateral lumbar medial branch rhizotomies: 100% ongoing pain relief and modest functional improvement.  Prior to lumbar surgery:  09/2021: Therapeutic bilateral L3-L4 transforaminal epidural steroid injections    07/19/2021: DxTx bilateral L3-L4 transforaminal epidural steroid injection   Surgical Measures:  10/18/2021: lumbar laminectomies with medial facetectomies and foraminotomies at L3-L4 and L4-L5 by Dr. Kenan Arevalo.   [facility] psychological evaluation with Dr. Casey Piña on 2/26/2024, per note: \"From a psychological perspective, patient is considered to be an appropriate candidate for spinal cord stimulator at this time.\"  Chris Patel underwent spine neurosurgical consultation with Teresa Coreas PA-C for Dr. Herminio Clement with [facility] and was found to be a potential surgical candidate for PLIF L3-S1.  Chris Patel presents with significant comorbidities including osteoarthritis, chronic kidney disease, hyperlipidemia, hearing loss, hypertension, psoriasis, tobacco abuse  In terms of current analgesics, Chris Patel takes: Gabapentin. Patient also takes trazodone  I have reviewed " Gabriel Report consistent with medication reconciliation.  SOAPP:  High Risk           Global Pain Scale 08-08 2023 01-11 2024               Pain 20 12               Feelings 6 0               Clinical outcomes 12 3               Activities 20 10               GPS Total: 58 25                 The [place] Back Pain Disability Scale   DATE 08-08 2023 01-11 2024               Sleep through the night 3 1               Turn over in bed 0 0               Get out of bed 5 0               Make your bed 0 0               Put on socks (pantyhose) 1 0               Ride in a car 0 0               Sit in a chair for several hours 2 1               Stand up for 20-30 minutes 2 1               Climb one flight of stairs 2 1               Walk a few blocks (200-300 yards)  5 1               Walk several miles 5 2               Run one block (about 50 yards) 5 2               Take food out of the refrigerator 0 0               Reach up to high shelves 0 1               Move a chair 1 1               Pull or push heavy doors 1 1               Bend over to clean the bathtub 3 1               Throw a ball 0 1               Carry two bags of groceries 3 1               Lift and carry a heavy suitcase 5 1               Total score 43 16                    Review of New Diagnostic Studies:   MRI of the thoracic spine without contrast 2/8/2024: Mild spondylosis, no significant spinal canal neuroforaminal stenosis.  Lumbar spine x-rays flexion-extension views 2/8/2024: Diffuse degenerative disc disease with lower lumbar facet arthritis.  No significant listhesis or instability.    Previous Diagnostic Studies:   MRI of the cervical spine without contrast 07/13/2023: multilevel cervical spondylosis.   C2-C3: Facet hypertrophy, disc osteophyte complex. Mild canal stenosis.  C3-C4: Facet hypertrophy, disc osteophyte complex. Mild canal stenosis. Moderate bilateral foraminal stenosis  C4-C5: Facet hypertrophy, disc osteophyte complex. Mild  "canal stenosis. Moderate to severe bilateral foraminal stenosis  C5-C6: Disc osteophyte complex, facet hypertrophy. Mild canal stenosis.  Severe bilateral foraminal stenosis  C6-C7: Facet hypertrophy, disc osteophyte complex. Mild canal stenosis. Moderate bilateral foraminal stenosis  MRI of the lumbar spine without contrast 04/20/2023:  Multilevel spondylosis with degenerative disc disease and facet hypertrophy at all lumbar levels.   T11-T12, T12-L1, L1-L2: No significant canal or foraminal stenosis  L2-L3: Slight retrolisthesis, disc bulge, facet hypertrophy.  Mild canal stenosis and mild foraminal stenosis  L3-L4: Evidence of previous laminectomy.  Disc bulge.  Facet hypertrophy.  No significant canal stenosis.  Mild foraminal stenosis  L4-L5: Evidence of previous laminectomy.  Disc bulge.  Facet hypertrophy.  No significant canal stenosis.  Mild foraminal stenosis   L5-S1: Disc bulge, facet hypertrophy.  No significant canal stenosis.  Mild left foraminal stenosis    The following portions of the patient's history were reviewed and updated as appropriate: problem list, past medical history, past surgery history, social history, family history, medications, and allergies    Review of Systems      Patient Active Problem List   Diagnosis    Lumbar stenosis with neurogenic claudication    Spondylosis of lumbar region without myelopathy or radiculopathy    Degeneration of lumbar or lumbosacral intervertebral disc    Physical deconditioning    Gait disturbance    FRANCISCO JAVIER (acute kidney injury)    Bilateral low back pain without sciatica    Body mass index (BMI) of 20 to 24    Cigarette smoker    Decreased GFR    Mixed hyperlipidemia    Hypertension, benign    Screening for depression    Lumbar postlaminectomy syndrome    Polyneuropathy    Sarcopenia       Past Medical History:   Diagnosis Date    Arthritis     Back pain     Cancer     pre skin cancerous areas - few     Chronic kidney disease     \"weak kidneys\"    Full " dentures     full top and partial at bottom     High cholesterol     [place] (hard of hearing)     bilat hearing aids     Hypertension     Psoriasis     Wears glasses     Wears hearing aid     bilat          Past Surgical History:   Procedure Laterality Date    HERNIA REPAIR      umbi;lical hernia - with mesh     LUMBAR LAMINECTOMY DISCECTOMY DECOMPRESSION N/A 10/18/2021    Procedure: LUMBAR LAMINECTOMY  L3-5;  Surgeon: Kenan Arevalo MD;  Location: [facility];  Service: Neurosurgery;  Laterality: N/A;         Family History   Family history unknown: Yes         Social History     Socioeconomic History    Marital status:    Tobacco Use    Smoking status: Former     Current packs/day: 0.00     Types: Cigarettes     Quit date:      Years since quittin.2    Smokeless tobacco: Never    Tobacco comments:     smoked on and off    Vaping Use    Vaping status: Never Used   Substance and Sexual Activity    Alcohol use: Yes     Alcohol/week: 7.0 standard drinks of alcohol     Types: 7 Shots of liquor per week     Comment: bourbon    Drug use: Never    Sexual activity: Defer           Current Outpatient Medications:     Alpha Lipoic Acid 200 MG capsule, Take 400 mg by mouth 3 (Three) Times a Day., Disp: 180 capsule, Rfl: 5    atorvastatin (LIPITOR) 40 MG tablet, Take 1 tablet by mouth every night at bedtime., Disp: , Rfl:     Dietary Management Product (Rheumate) capsule, Take 1 capsule by mouth Daily., Disp: 90 capsule, Rfl: 1    gabapentin (NEURONTIN) 300 MG capsule, Take 2 capsules by mouth Every Night., Disp: , Rfl:     HYDROcodone-acetaminophen (NORCO) 5-325 MG per tablet, Take 1 tablet by mouth Every 8 (Eight) Hours As Needed for Moderate Pain for up to 15 doses., Disp: 15 tablet, Rfl: 0    lisinopril (PRINIVIL,ZESTRIL) 40 MG tablet, Take 1 tablet by mouth Daily., Disp: , Rfl:     Risankizumab-rzaa (SKYRIZI, 150 MG DOSE, SC), Inject 75 mg under the skin into the appropriate area as directed. Every 3 months-  "  Ld 8th, Disp: , Rfl:     sildenafil (VIAGRA) 100 MG tablet, Take 1 tablet by mouth Daily., Disp: , Rfl:     traZODone (DESYREL) 100 MG tablet, Take 1 tablet by mouth Daily., Disp: , Rfl:     vitamin B-6 (PYRIDOXINE) 100 MG tablet, Take 1 tablet by mouth Daily., Disp: 30 tablet, Rfl: 0      Allergies   Allergen Reactions    Penicillins Hives         Ht 177.8 cm (70\")   Wt 77.6 kg (171 lb)   BMI 24.54 kg/m²       Physical Exam   Constitutional: Patient appears well-developed, well-nourished, well-hydrated  HEENT: Head: Normocephalic and atraumatic  Eyes: Conjunctivae and lids are normal  Pupils: Equal, round, reactive to light  Musculoskeletal   Gait and station: Gait evaluation demonstrated shuffling   Neurological:   Patient is alert and oriented to person, place, and time.   Speech: Normal.   Cortical function: Normal mental status.   Motor strength:  5/5  Motor Tone: Normal .   Involuntary movements: None.   Skin and subcutaneous tissue: Skin is warm and intact. No rash noted. No cyanosis. Psychiatric: Judgment and insight: Normal. Recent and remote memory: Intact. Mood and affect: Normal.   The stimulator placement site looks unremarkable without erythema, drainage or fluid accumulation  Psychiatric: Judgment and insight: Normal. Recent and remote memory: Intact. Mood and affect: Normal.     PROCEDURES:   Procedure #1: Analysis of the spinal cord stimulator device  without spinal cord stimulator reprogramming  Patient used the Medtronic spinal cord stimulator device 100 % of the time since initiation of the trial phase.     Program B1 (Preferred Program):    Electrode polarities: 9-, 11+   Amplitude: 4.5 mA     Pulse width: 200 mcs  Rate: 50 Hz      Procedure #2: Removal of spinal cord stimulator trial leads.   Two leads were removed with tips intact. There is no erythema, drainage, or fluid accumulation at the site. The area was cleansed with chlorhexidine.  A small Covaderm was applied.     ASSESSMENT:   1. " Lumbar postlaminectomy syndrome    2. Lumbar stenosis with neurogenic claudication    3. Polyneuropathy    4. Degeneration of lumbar or lumbosacral intervertebral disc    5. Spondylosis of lumbar region without myelopathy or radiculopathy    6. Sarcopenia    7. Gait disturbance    8. Physical deconditioning    9. Encounter for fitting or adjustment of neuropacemaker        PLAN/MEDICAL DECISION MAKING:  Mr. Chris Patel, 78 y.o. male originally referred by Dr. Herminio Hernandes in consultation of chronic intractable lower back pain.  He reports a longstanding history of chronic intractable lower back pain.  He previously undergone epidural injections in September 2021, although were not long sustaining, therefore on October 18, 2021 he underwent lumbar laminectomies with medial facetectomies and foraminotomies at L3-L4 and L4-L5 by Dr. Arevalo.  By June 2022, he saw neurosurgery and was complaining of residual symptoms.  He was seen by Dr. Emerson with [facility] in [facility] for treatment of persistent axial mechanical lower back pain.  Dr. Clement requested consideration for medial branch blocks and RFA, if beneficial Dr. Clement would consider an L3-S1 lumbar fusion.  An MRI of the lumbar spine revealed postsurgical change from prior laminectomies at L3-L4 and L4-L5.  At L2-L3 there is facet hypertrophy with loss of disc height, no significant spinal or neuroforaminal stenosis.  At L5-S1 there is Modic endplate changes.  On August 21, 2023 he underwent bilateral lumbar medial branch rhizotomies, from which he has reported 100% pain relief with modest functional improvement.  He was still complaining from symptoms related to lumbar postlaminectomy syndrome, painful peripheral polyneuropathy and PAD. He has failed to obtain pain relief with surgical measures ans well as conservative measures including oral analgesics, opioids, topical analgesics, ice, heat, physical therapy, physical therapist  directed home exercise program HEP (ongoing), to name a few.  Patient is very satisfied with the trial and would like to move forward with implantation of a spinal cord stimulator device. I had a lengthy conversation with Mr. Chris Patel regarding his chronic pain condition and potential therapeutic options including risks, benefits, alternative therapies, to name a few. Therefore, I have proposed the following plan:  1.  Patient will be scheduled for implantation of a spinal cord stimulator device with Medtronic. I have recommended two Medtronic WedPics (deja mi) compact SureScan MRI (MRI full body in 1.5 Monique) with the top electrodes projecting at the level of the superior endplate of the T7 vertebral level. I have recommended Medtronic Home Inns Intellis with AdaptiveStim (rechargeable):  Full body MRI compatible up to 1.5 Monique  2. Recommendations for infection control prior to surgery, as follows:  Start over-the-counter Hibiclens showers daily 5 days before surgery  Chlorhexidine towelettes (given at time of Pre-Admission Testing appointment) night before and day of surgery  Routine cephalosporin surgical prophylaxis  Patient will wear an abdominal binder for 2-4 weeks after surgery.   I have ordered a back brace for postsurgical care. Patient is going to wear a rigid LSO for a period of at least 6 weeks to facilitate healing and reduce movement following surgery.    Patient has been prescribed a rigid LSO to wear after surgery for a period of at least 6 weeks to facilitate healing. The brace will limit motion to stabilize patient following surgery.    MedTELOS TYRX Neuro absorbable antibacterial envelope MRI safe. The absorbable polymer coating contains the antimicrobial agents rifampin and minocycline in concentration of 8 mg rifampin and 5.1 minocycline (medium size: 6.3 cm x 6.8 cm) and 11.9 mg rifampin and 7.6 mg minocycline for the large size (7.4 cm x 8.5 cm).    3. The patient has been instructed to contact  my office with any questions or difficulties. The patient understands the plan and agrees to proceed accordingly.        Pain Medications               gabapentin (NEURONTIN) 300 MG capsule Take 2 capsules by mouth Every Night.    HYDROcodone-acetaminophen (NORCO) 5-325 MG per tablet Take 1 tablet by mouth Every 8 (Eight) Hours As Needed for Moderate Pain for up to 15 doses.    traZODone (DESYREL) 100 MG tablet Take 1 tablet by mouth Daily.             No orders of the defined types were placed in this encounter.       No orders of the defined types were placed in this encounter.        No future appointments.         Please note that portions of this note were completed with a voice recognition program.     MARSHALL Donovan    Patient Care Team:  Shannan Rivera MD as PCP - General (Internal Medicine & Pediatrics)  Shannan Rivera MD as Referring Physician (Internal Medicine & Pediatrics)  Akin Rodriguez MD as Consulting Physician (Pain Medicine)

## 2024-04-04 ENCOUNTER — PREP FOR SURGERY (OUTPATIENT)
Dept: PERIOP | Facility: HOSPITAL | Age: 79
End: 2024-04-04
Payer: MEDICARE

## 2024-04-04 DIAGNOSIS — M62.84 SARCOPENIA: ICD-10-CM

## 2024-04-04 DIAGNOSIS — M96.1 LUMBAR POSTLAMINECTOMY SYNDROME: Primary | ICD-10-CM

## 2024-04-04 DIAGNOSIS — R53.81 PHYSICAL DECONDITIONING: ICD-10-CM

## 2024-04-04 DIAGNOSIS — M47.816 SPONDYLOSIS OF LUMBAR REGION WITHOUT MYELOPATHY OR RADICULOPATHY: ICD-10-CM

## 2024-04-04 DIAGNOSIS — M51.37 DEGENERATION OF LUMBAR OR LUMBOSACRAL INTERVERTEBRAL DISC: ICD-10-CM

## 2024-04-04 DIAGNOSIS — R26.9 GAIT DISTURBANCE: ICD-10-CM

## 2024-04-04 DIAGNOSIS — M48.062 LUMBAR STENOSIS WITH NEUROGENIC CLAUDICATION: ICD-10-CM

## 2024-04-04 DIAGNOSIS — G62.9 POLYNEUROPATHY: ICD-10-CM

## 2024-04-04 RX ORDER — SODIUM CHLORIDE 0.9 % (FLUSH) 0.9 %
3 SYRINGE (ML) INJECTION EVERY 12 HOURS SCHEDULED
Status: CANCELLED | OUTPATIENT
Start: 2024-04-04

## 2024-04-04 RX ORDER — CHLORHEXIDINE GLUCONATE 4 G/100ML
SOLUTION TOPICAL DAILY PRN
Qty: 236 ML | Refills: 0 | Status: ON HOLD | OUTPATIENT
Start: 2024-04-04 | End: 2024-04-08

## 2024-04-04 RX ORDER — SODIUM CHLORIDE 0.9 % (FLUSH) 0.9 %
10 SYRINGE (ML) INJECTION AS NEEDED
Status: CANCELLED | OUTPATIENT
Start: 2024-04-04

## 2024-04-04 RX ORDER — SODIUM CHLORIDE 9 MG/ML
40 INJECTION, SOLUTION INTRAVENOUS AS NEEDED
Status: CANCELLED | OUTPATIENT
Start: 2024-04-04

## 2024-04-05 ENCOUNTER — PRE-ADMISSION TESTING (OUTPATIENT)
Dept: PREADMISSION TESTING | Facility: HOSPITAL | Age: 79
End: 2024-04-05
Payer: MEDICARE

## 2024-04-05 VITALS — WEIGHT: 166.78 LBS | BODY MASS INDEX: 23.35 KG/M2 | HEIGHT: 71 IN

## 2024-04-05 DIAGNOSIS — R26.9 GAIT DISTURBANCE: ICD-10-CM

## 2024-04-05 DIAGNOSIS — M62.84 SARCOPENIA: ICD-10-CM

## 2024-04-05 DIAGNOSIS — M96.1 LUMBAR POSTLAMINECTOMY SYNDROME: ICD-10-CM

## 2024-04-05 DIAGNOSIS — M48.062 LUMBAR STENOSIS WITH NEUROGENIC CLAUDICATION: ICD-10-CM

## 2024-04-05 DIAGNOSIS — G62.9 POLYNEUROPATHY: ICD-10-CM

## 2024-04-05 DIAGNOSIS — R53.81 PHYSICAL DECONDITIONING: ICD-10-CM

## 2024-04-05 DIAGNOSIS — M51.37 DEGENERATION OF LUMBAR OR LUMBOSACRAL INTERVERTEBRAL DISC: ICD-10-CM

## 2024-04-05 DIAGNOSIS — M47.816 SPONDYLOSIS OF LUMBAR REGION WITHOUT MYELOPATHY OR RADICULOPATHY: ICD-10-CM

## 2024-04-05 LAB
ALBUMIN SERPL-MCNC: 4.3 G/DL (ref 3.5–5.2)
ALBUMIN/GLOB SERPL: 1.3 G/DL
ALP SERPL-CCNC: 86 U/L (ref 39–117)
ALT SERPL W P-5'-P-CCNC: 9 U/L (ref 1–41)
ANION GAP SERPL CALCULATED.3IONS-SCNC: 9 MMOL/L (ref 5–15)
APTT PPP: 28.5 SECONDS (ref 22–39)
AST SERPL-CCNC: 20 U/L (ref 1–40)
BASOPHILS # BLD AUTO: 0.04 10*3/MM3 (ref 0–0.2)
BASOPHILS NFR BLD AUTO: 0.6 % (ref 0–1.5)
BILIRUB SERPL-MCNC: 0.6 MG/DL (ref 0–1.2)
BILIRUB UR QL STRIP: NEGATIVE
BUN SERPL-MCNC: 21 MG/DL (ref 8–23)
BUN/CREAT SERPL: 18.8 (ref 7–25)
CALCIUM SPEC-SCNC: 9.7 MG/DL (ref 8.6–10.5)
CHLORIDE SERPL-SCNC: 99 MMOL/L (ref 98–107)
CLARITY UR: CLEAR
CO2 SERPL-SCNC: 27 MMOL/L (ref 22–29)
COLOR UR: YELLOW
CREAT SERPL-MCNC: 1.12 MG/DL (ref 0.76–1.27)
DEPRECATED RDW RBC AUTO: 49.1 FL (ref 37–54)
EGFRCR SERPLBLD CKD-EPI 2021: 66.8 ML/MIN/1.73
EOSINOPHIL # BLD AUTO: 0.13 10*3/MM3 (ref 0–0.4)
EOSINOPHIL NFR BLD AUTO: 2 % (ref 0.3–6.2)
ERYTHROCYTE [DISTWIDTH] IN BLOOD BY AUTOMATED COUNT: 13.7 % (ref 12.3–15.4)
GLOBULIN UR ELPH-MCNC: 3.3 GM/DL
GLUCOSE SERPL-MCNC: 109 MG/DL (ref 65–99)
GLUCOSE UR STRIP-MCNC: NEGATIVE MG/DL
HCT VFR BLD AUTO: 42.5 % (ref 37.5–51)
HGB BLD-MCNC: 13.9 G/DL (ref 13–17.7)
HGB UR QL STRIP.AUTO: NEGATIVE
IMM GRANULOCYTES # BLD AUTO: 0.05 10*3/MM3 (ref 0–0.05)
IMM GRANULOCYTES NFR BLD AUTO: 0.8 % (ref 0–0.5)
INR PPP: 0.99 (ref 0.89–1.12)
KETONES UR QL STRIP: NEGATIVE
LEUKOCYTE ESTERASE UR QL STRIP.AUTO: NEGATIVE
LYMPHOCYTES # BLD AUTO: 1.28 10*3/MM3 (ref 0.7–3.1)
LYMPHOCYTES NFR BLD AUTO: 19.8 % (ref 19.6–45.3)
MCH RBC QN AUTO: 32.4 PG (ref 26.6–33)
MCHC RBC AUTO-ENTMCNC: 32.7 G/DL (ref 31.5–35.7)
MCV RBC AUTO: 99.1 FL (ref 79–97)
MONOCYTES # BLD AUTO: 0.48 10*3/MM3 (ref 0.1–0.9)
MONOCYTES NFR BLD AUTO: 7.4 % (ref 5–12)
NEUTROPHILS NFR BLD AUTO: 4.49 10*3/MM3 (ref 1.7–7)
NEUTROPHILS NFR BLD AUTO: 69.4 % (ref 42.7–76)
NITRITE UR QL STRIP: NEGATIVE
NRBC BLD AUTO-RTO: 0 /100 WBC (ref 0–0.2)
PH UR STRIP.AUTO: 5.5 [PH] (ref 5–8)
PLATELET # BLD AUTO: 163 10*3/MM3 (ref 140–450)
PMV BLD AUTO: 9.8 FL (ref 6–12)
POTASSIUM SERPL-SCNC: 4.5 MMOL/L (ref 3.5–5.2)
PROT SERPL-MCNC: 7.6 G/DL (ref 6–8.5)
PROT UR QL STRIP: NEGATIVE
PROTHROMBIN TIME: 13.1 SECONDS (ref 12.2–14.5)
QT INTERVAL: 388 MS
QTC INTERVAL: 436 MS
RBC # BLD AUTO: 4.29 10*6/MM3 (ref 4.14–5.8)
SODIUM SERPL-SCNC: 135 MMOL/L (ref 136–145)
SP GR UR STRIP: 1.02 (ref 1–1.03)
UROBILINOGEN UR QL STRIP: NORMAL
WBC NRBC COR # BLD AUTO: 6.47 10*3/MM3 (ref 3.4–10.8)

## 2024-04-05 PROCEDURE — 85730 THROMBOPLASTIN TIME PARTIAL: CPT

## 2024-04-05 PROCEDURE — 93010 ELECTROCARDIOGRAM REPORT: CPT | Performed by: INTERNAL MEDICINE

## 2024-04-05 PROCEDURE — 93005 ELECTROCARDIOGRAM TRACING: CPT

## 2024-04-05 PROCEDURE — 81003 URINALYSIS AUTO W/O SCOPE: CPT

## 2024-04-05 PROCEDURE — 36415 COLL VENOUS BLD VENIPUNCTURE: CPT

## 2024-04-05 PROCEDURE — 85610 PROTHROMBIN TIME: CPT

## 2024-04-05 PROCEDURE — 80053 COMPREHEN METABOLIC PANEL: CPT

## 2024-04-05 PROCEDURE — 85025 COMPLETE CBC W/AUTO DIFF WBC: CPT

## 2024-04-05 NOTE — PAT
Patient did not review general PAT education video as instructed in their preoperative information received from their surgeon.  One-on-one Pre Admission Testing general education provided during PAT visit.  Copies of PAT general education handouts (Incentive Spirometry, Meds to Beds Program, Patient Belongings, Pre-op skin preparation instructions, Blood Glucose testing, Visitor policy, Surgery FAQ, Code H) distributed to patient. Encouraged patient/family to read PAT general education handouts thoroughly and notify PAT staff with any questions or concerns. Patient instructed to bring PAT pass and completed skin prep sheet (if applicable) on the day of procedure. Patient verbalized understanding of all information and priority content.     Bactroban (if prescribed) and Chlorhexidine Prescription prescribed by physician before PAT visit.  Verified with patient that medication(s) were picked up from their pharmacy.  Written instructions given to patient during PAT visit.  Patient/family also instructed to complete skin prep checklist and return the checklist on the day of surgery to preoperative staff.  Patient/family verbalized understanding.    Patient to apply Chlorhexadine wipes  to surgical area (as instructed) the night before procedure and the AM of procedure. Wipes provided.    Pt already started Bactroban, unable to do MRSA swab.     Per Anesthesia Request, patient instructed not to take their ACE/ARB medications on the AM of surgery.

## 2024-04-07 ENCOUNTER — ANESTHESIA EVENT (OUTPATIENT)
Dept: PERIOP | Facility: HOSPITAL | Age: 79
End: 2024-04-07
Payer: MEDICARE

## 2024-04-07 RX ORDER — SODIUM CHLORIDE 0.9 % (FLUSH) 0.9 %
10 SYRINGE (ML) INJECTION EVERY 12 HOURS SCHEDULED
Status: CANCELLED | OUTPATIENT
Start: 2024-04-07

## 2024-04-07 RX ORDER — SODIUM CHLORIDE 9 MG/ML
40 INJECTION, SOLUTION INTRAVENOUS AS NEEDED
Status: CANCELLED | OUTPATIENT
Start: 2024-04-07

## 2024-04-07 RX ORDER — SODIUM CHLORIDE 0.9 % (FLUSH) 0.9 %
10 SYRINGE (ML) INJECTION AS NEEDED
Status: CANCELLED | OUTPATIENT
Start: 2024-04-07

## 2024-04-07 RX ORDER — FAMOTIDINE 10 MG/ML
20 INJECTION, SOLUTION INTRAVENOUS ONCE
Status: CANCELLED | OUTPATIENT
Start: 2024-04-07 | End: 2024-04-07

## 2024-04-08 ENCOUNTER — ANESTHESIA (OUTPATIENT)
Dept: PERIOP | Facility: HOSPITAL | Age: 79
End: 2024-04-08
Payer: MEDICARE

## 2024-04-08 ENCOUNTER — HOSPITAL ENCOUNTER (OUTPATIENT)
Facility: HOSPITAL | Age: 79
Setting detail: HOSPITAL OUTPATIENT SURGERY
Discharge: HOME | End: 2024-04-08
Attending: ANESTHESIOLOGY | Admitting: ANESTHESIOLOGY
Payer: MEDICARE

## 2024-04-08 ENCOUNTER — APPOINTMENT (OUTPATIENT)
Dept: GENERAL RADIOLOGY | Facility: HOSPITAL | Age: 79
End: 2024-04-08
Payer: MEDICARE

## 2024-04-08 VITALS
BODY MASS INDEX: 23.24 KG/M2 | DIASTOLIC BLOOD PRESSURE: 76 MMHG | HEIGHT: 71 IN | OXYGEN SATURATION: 94 % | SYSTOLIC BLOOD PRESSURE: 133 MMHG | TEMPERATURE: 97.5 F | HEART RATE: 58 BPM | RESPIRATION RATE: 16 BRPM | WEIGHT: 166 LBS

## 2024-04-08 DIAGNOSIS — G62.9 POLYNEUROPATHY: ICD-10-CM

## 2024-04-08 DIAGNOSIS — M96.1 LUMBAR POSTLAMINECTOMY SYNDROME: ICD-10-CM

## 2024-04-08 DIAGNOSIS — R26.9 GAIT DISTURBANCE: ICD-10-CM

## 2024-04-08 DIAGNOSIS — M62.84 SARCOPENIA: ICD-10-CM

## 2024-04-08 DIAGNOSIS — M48.062 LUMBAR STENOSIS WITH NEUROGENIC CLAUDICATION: ICD-10-CM

## 2024-04-08 DIAGNOSIS — G89.18 POSTOPERATIVE PAIN: Primary | ICD-10-CM

## 2024-04-08 DIAGNOSIS — R53.81 PHYSICAL DECONDITIONING: ICD-10-CM

## 2024-04-08 DIAGNOSIS — M51.37 DEGENERATION OF LUMBAR OR LUMBOSACRAL INTERVERTEBRAL DISC: ICD-10-CM

## 2024-04-08 DIAGNOSIS — M47.816 SPONDYLOSIS OF LUMBAR REGION WITHOUT MYELOPATHY OR RADICULOPATHY: ICD-10-CM

## 2024-04-08 PROCEDURE — L8689 EXTERNAL RECHARG SYS INTERN: HCPCS | Performed by: ANESTHESIOLOGY

## 2024-04-08 PROCEDURE — C9290 INJ, BUPIVACAINE LIPOSOME: HCPCS | Performed by: ANESTHESIOLOGY

## 2024-04-08 PROCEDURE — 25010000002 VANCOMYCIN 1 G RECONSTITUTED SOLUTION: Performed by: ANESTHESIOLOGY

## 2024-04-08 PROCEDURE — 25810000003 SODIUM CHLORIDE PER 500 ML: Performed by: ANESTHESIOLOGY

## 2024-04-08 PROCEDURE — 25010000002 FENTANYL CITRATE (PF) 100 MCG/2ML SOLUTION

## 2024-04-08 PROCEDURE — C1889 IMPLANT/INSERT DEVICE, NOC: HCPCS | Performed by: ANESTHESIOLOGY

## 2024-04-08 PROCEDURE — C1787 PATIENT PROGR, NEUROSTIM: HCPCS | Performed by: ANESTHESIOLOGY

## 2024-04-08 PROCEDURE — 25010000002 CEFAZOLIN PER 500 MG: Performed by: ANESTHESIOLOGY

## 2024-04-08 PROCEDURE — 0 BUPIVACAINE LIPOSOME 1.3 % SUSPENSION: Performed by: ANESTHESIOLOGY

## 2024-04-08 PROCEDURE — 25810000003 LACTATED RINGERS PER 1000 ML: Performed by: ANESTHESIOLOGY

## 2024-04-08 PROCEDURE — 63685 INS/RPLC SPI NPG/RCVR POCKET: CPT | Performed by: ANESTHESIOLOGY

## 2024-04-08 PROCEDURE — 25010000002 PROPOFOL 10 MG/ML EMULSION

## 2024-04-08 PROCEDURE — 76000 FLUOROSCOPY <1 HR PHYS/QHP: CPT

## 2024-04-08 PROCEDURE — C1820 GENERATOR NEURO RECHG BAT SY: HCPCS | Performed by: ANESTHESIOLOGY

## 2024-04-08 PROCEDURE — 25010000002 MIDAZOLAM PER 1 MG

## 2024-04-08 PROCEDURE — 63650 IMPLANT NEUROELECTRODES: CPT | Performed by: ANESTHESIOLOGY

## 2024-04-08 PROCEDURE — C1778 LEAD, NEUROSTIMULATOR: HCPCS | Performed by: ANESTHESIOLOGY

## 2024-04-08 PROCEDURE — 95972 ALYS CPLX SP/PN NPGT W/PRGRM: CPT | Performed by: ANESTHESIOLOGY

## 2024-04-08 DEVICE — ENV ANTIBAC TYRX NEURO ABS MD: Type: IMPLANTABLE DEVICE | Site: BACK | Status: FUNCTIONAL

## 2024-04-08 DEVICE — LD STIM VECTRIS SURESCAN MRI 1X8 COMPCT 60CM: Type: IMPLANTABLE DEVICE | Site: BACK | Status: FUNCTIONAL

## 2024-04-08 DEVICE — KT ANCHR SCS INJEX BI/WING: Type: IMPLANTABLE DEVICE | Site: BACK | Status: FUNCTIONAL

## 2024-04-08 DEVICE — NEUROSTM INTELLIS SURESCAN MRI W/ADAPTIVE STIM RECHG: Type: IMPLANTABLE DEVICE | Site: BACK | Status: FUNCTIONAL

## 2024-04-08 RX ORDER — SODIUM CHLORIDE, SODIUM LACTATE, POTASSIUM CHLORIDE, CALCIUM CHLORIDE 600; 310; 30; 20 MG/100ML; MG/100ML; MG/100ML; MG/100ML
9 INJECTION, SOLUTION INTRAVENOUS CONTINUOUS
Status: DISCONTINUED | OUTPATIENT
Start: 2024-04-08 | End: 2024-04-08 | Stop reason: HOSPADM

## 2024-04-08 RX ORDER — HYDROMORPHONE HYDROCHLORIDE 1 MG/ML
0.5 INJECTION, SOLUTION INTRAMUSCULAR; INTRAVENOUS; SUBCUTANEOUS
Status: DISCONTINUED | OUTPATIENT
Start: 2024-04-08 | End: 2024-04-08 | Stop reason: HOSPADM

## 2024-04-08 RX ORDER — FENTANYL CITRATE 50 UG/ML
INJECTION, SOLUTION INTRAMUSCULAR; INTRAVENOUS AS NEEDED
Status: DISCONTINUED | OUTPATIENT
Start: 2024-04-08 | End: 2024-04-08 | Stop reason: SURG

## 2024-04-08 RX ORDER — LIDOCAINE HYDROCHLORIDE 10 MG/ML
0.5 INJECTION, SOLUTION EPIDURAL; INFILTRATION; INTRACAUDAL; PERINEURAL ONCE AS NEEDED
Status: COMPLETED | OUTPATIENT
Start: 2024-04-08 | End: 2024-04-08

## 2024-04-08 RX ORDER — DROPERIDOL 2.5 MG/ML
0.62 INJECTION, SOLUTION INTRAMUSCULAR; INTRAVENOUS ONCE AS NEEDED
Status: DISCONTINUED | OUTPATIENT
Start: 2024-04-08 | End: 2024-04-08 | Stop reason: HOSPADM

## 2024-04-08 RX ORDER — FAMOTIDINE 20 MG/1
20 TABLET, FILM COATED ORAL ONCE
Status: COMPLETED | OUTPATIENT
Start: 2024-04-08 | End: 2024-04-08

## 2024-04-08 RX ORDER — NALOXONE HYDROCHLORIDE 4 MG/.1ML
SPRAY NASAL
Qty: 2 EACH | Refills: 0 | Status: SHIPPED | OUTPATIENT
Start: 2024-04-08

## 2024-04-08 RX ORDER — HYDROCODONE BITARTRATE AND ACETAMINOPHEN 5; 325 MG/1; MG/1
TABLET ORAL
Qty: 10 TABLET | Refills: 0 | Status: SHIPPED | OUTPATIENT
Start: 2024-04-08

## 2024-04-08 RX ORDER — BUPIVACAINE HYDROCHLORIDE AND EPINEPHRINE 2.5; 5 MG/ML; UG/ML
INJECTION, SOLUTION EPIDURAL; INFILTRATION; INTRACAUDAL; PERINEURAL AS NEEDED
Status: DISCONTINUED | OUTPATIENT
Start: 2024-04-08 | End: 2024-04-08 | Stop reason: HOSPADM

## 2024-04-08 RX ORDER — PROPOFOL 10 MG/ML
VIAL (ML) INTRAVENOUS AS NEEDED
Status: DISCONTINUED | OUTPATIENT
Start: 2024-04-08 | End: 2024-04-08 | Stop reason: SURG

## 2024-04-08 RX ORDER — LIDOCAINE HYDROCHLORIDE 10 MG/ML
INJECTION, SOLUTION EPIDURAL; INFILTRATION; INTRACAUDAL; PERINEURAL AS NEEDED
Status: DISCONTINUED | OUTPATIENT
Start: 2024-04-08 | End: 2024-04-08 | Stop reason: SURG

## 2024-04-08 RX ORDER — MIDAZOLAM HYDROCHLORIDE 1 MG/ML
0.5 INJECTION INTRAMUSCULAR; INTRAVENOUS
Status: DISCONTINUED | OUTPATIENT
Start: 2024-04-08 | End: 2024-04-08 | Stop reason: HOSPADM

## 2024-04-08 RX ORDER — SODIUM CHLORIDE 9 MG/ML
INJECTION, SOLUTION INTRAVENOUS AS NEEDED
Status: DISCONTINUED | OUTPATIENT
Start: 2024-04-08 | End: 2024-04-08 | Stop reason: HOSPADM

## 2024-04-08 RX ORDER — MAGNESIUM HYDROXIDE 1200 MG/15ML
LIQUID ORAL AS NEEDED
Status: DISCONTINUED | OUTPATIENT
Start: 2024-04-08 | End: 2024-04-08 | Stop reason: HOSPADM

## 2024-04-08 RX ORDER — MIDAZOLAM HYDROCHLORIDE 1 MG/ML
INJECTION INTRAMUSCULAR; INTRAVENOUS AS NEEDED
Status: DISCONTINUED | OUTPATIENT
Start: 2024-04-08 | End: 2024-04-08 | Stop reason: SURG

## 2024-04-08 RX ORDER — VANCOMYCIN HYDROCHLORIDE 1 G/20ML
INJECTION, POWDER, LYOPHILIZED, FOR SOLUTION INTRAVENOUS AS NEEDED
Status: DISCONTINUED | OUTPATIENT
Start: 2024-04-08 | End: 2024-04-08 | Stop reason: HOSPADM

## 2024-04-08 RX ORDER — EPHEDRINE SULFATE 50 MG/ML
INJECTION INTRAVENOUS AS NEEDED
Status: DISCONTINUED | OUTPATIENT
Start: 2024-04-08 | End: 2024-04-08 | Stop reason: SURG

## 2024-04-08 RX ORDER — FENTANYL CITRATE 50 UG/ML
50 INJECTION, SOLUTION INTRAMUSCULAR; INTRAVENOUS
Status: DISCONTINUED | OUTPATIENT
Start: 2024-04-08 | End: 2024-04-08 | Stop reason: HOSPADM

## 2024-04-08 RX ADMIN — FAMOTIDINE 20 MG: 20 TABLET, FILM COATED ORAL at 12:36

## 2024-04-08 RX ADMIN — LIDOCAINE HYDROCHLORIDE 0.2 ML: 10 INJECTION, SOLUTION EPIDURAL; INFILTRATION; INTRACAUDAL; PERINEURAL at 11:55

## 2024-04-08 RX ADMIN — SODIUM CHLORIDE, POTASSIUM CHLORIDE, SODIUM LACTATE AND CALCIUM CHLORIDE 9 ML/HR: 600; 310; 30; 20 INJECTION, SOLUTION INTRAVENOUS at 11:55

## 2024-04-08 RX ADMIN — PROPOFOL 30 MG: 10 INJECTION, EMULSION INTRAVENOUS at 13:47

## 2024-04-08 RX ADMIN — PROPOFOL 20 MG: 10 INJECTION, EMULSION INTRAVENOUS at 14:04

## 2024-04-08 RX ADMIN — EPHEDRINE SULFATE 5 MG: 50 INJECTION INTRAVENOUS at 14:12

## 2024-04-08 RX ADMIN — PROPOFOL 50 MG: 10 INJECTION, EMULSION INTRAVENOUS at 13:38

## 2024-04-08 RX ADMIN — FENTANYL CITRATE 50 MCG: 50 INJECTION, SOLUTION INTRAMUSCULAR; INTRAVENOUS at 13:46

## 2024-04-08 RX ADMIN — MIDAZOLAM HYDROCHLORIDE 1 MG: 1 INJECTION, SOLUTION INTRAMUSCULAR; INTRAVENOUS at 13:38

## 2024-04-08 RX ADMIN — SODIUM CHLORIDE 2000 MG: 900 INJECTION INTRAVENOUS at 13:42

## 2024-04-08 RX ADMIN — FENTANYL CITRATE 50 MCG: 50 INJECTION, SOLUTION INTRAMUSCULAR; INTRAVENOUS at 14:04

## 2024-04-08 RX ADMIN — PROPOFOL 30 MG: 10 INJECTION, EMULSION INTRAVENOUS at 14:14

## 2024-04-08 RX ADMIN — LIDOCAINE HYDROCHLORIDE 50 MG: 10 INJECTION, SOLUTION EPIDURAL; INFILTRATION; INTRACAUDAL; PERINEURAL at 13:38

## 2024-04-08 RX ADMIN — PROPOFOL 80 MCG/KG/MIN: 10 INJECTION, EMULSION INTRAVENOUS at 14:05

## 2024-04-08 RX ADMIN — PROPOFOL 30 MG: 10 INJECTION, EMULSION INTRAVENOUS at 14:23

## 2024-04-08 NOTE — ANESTHESIA POSTPROCEDURE EVALUATION
Patient: Chris Patel    Procedure Summary       Date: 04/08/24 Room / Location:  [facility] / [facility]    Anesthesia Start: 1333 Anesthesia Stop: 1506    Procedure: SPINAL CORD STIMULATOR INSERTION PHASE 2 (Back) Diagnosis:       Lumbar postlaminectomy syndrome      Lumbar stenosis with neurogenic claudication      Polyneuropathy      Degeneration of lumbar or lumbosacral intervertebral disc      Spondylosis of lumbar region without myelopathy or radiculopathy      Sarcopenia      Gait disturbance      Physical deconditioning      (Lumbar postlaminectomy syndrome [M96.1])      (Lumbar stenosis with neurogenic claudication [M48.062])      (Polyneuropathy [G62.9])      (Degeneration of lumbar or lumbosacral intervertebral disc [M51.37])      (Spondylosis of lumbar region without myelopathy or radiculopathy [M47.816])      (Sarcopenia [M62.84])      (Gait disturbance [R26.9])      (Physical deconditioning [R53.81])    Surgeons: Akin Rodriguez MD Provider: Gato Simon MD    Anesthesia Type: MAC ASA Status: 3            Anesthesia Type: MAC    Vitals  Vitals Value Taken Time   BP     Temp     Pulse 70 04/08/24 1506   Resp     SpO2 100 % 04/08/24 1506   Vitals shown include unfiled device data.        Post Anesthesia Care and Evaluation    Patient location during evaluation: PACU  Patient participation: complete - patient participated  Level of consciousness: awake and alert  Pain management: adequate    Airway patency: patent  Anesthetic complications: No anesthetic complications  PONV Status: none  Cardiovascular status: hemodynamically stable and acceptable  Respiratory status: nonlabored ventilation, acceptable and nasal cannula  Hydration status: acceptable

## 2024-04-08 NOTE — OP NOTE
PREOPERATIVE DIAGNOSES:   1. Lumbar postlaminectomy syndrome   2. Lumbar stenosis with neurogenic claudication   3. Polyneuropathy   4. Degeneration of lumbar or lumbosacral intervertebral disc   5. Spondylosis of lumbar region without myelopathy or radiculopathy   6. Sarcopenia   7. Gait disturbance   8. Physical deconditioning     POSTOPERATIVE DIAGNOSES:   1. Lumbar postlaminectomy syndrome   2. Lumbar stenosis with neurogenic claudication   3. Polyneuropathy   4. Degeneration of lumbar or lumbosacral intervertebral disc   5. Spondylosis of lumbar region without myelopathy or radiculopathy   6. Sarcopenia   7. Gait disturbance   8. Physical deconditioning   9. Presence of spinal cord stimulator   10. Encounter for fitting and adjustment of neuropacemaker of spinal cord      PROCEDURES PERFORMED:   Implantation of two spinal cord stimulator leads Medtronic Verdezyneris compact SureScan MRI (Full Body MRI Compatible) 60 cm SN [id number] Exp 03/18/2028 and SN [id number] Exp 03/18/2028  Anchors: Medtronic Injex (Full Body MRI Compatible)   Implantation of Medtronic Intellis with AdaptiveStim rechargeable IPG (Full Body MRI Compatible -1.5 Monique)  [id number] Exp 01/28/2025  CPT: 73719   Medtronic TYRX Neuro absorbable antibacterial envelope MRI safe. The absorbable polymer coating contains the antimicrobial agents rifampin and minocycline in concentration of 8 mg rifampin and 5.1 minocycline (medium size: 6.3 cm x 6.8 cm)   Complex programming of spinal cord stimulator device   CPT: 62548        ANESTHESIA: MAC plus Local anesthesia     SURGEON: Akin Rodriguez MD     COMPLICATIONS: None       BLOOD LOSS: Less than 20 cc        INDICATIONS/MEDICAL DECISION MAKING: Mr. Chris Patel presents with a history of chronic intractable lower back pain and severe neurogenic claudication. On 10/18/2021, he underwent lumbar laminectomies with medial facetectomies and foraminotomies at L3-L4 and L4-L5 by Dr. Arevalo. By June 2022, he  returned to neurosurgery complaining of residual symptoms.  He was evaluated by Dr. Herminio Hernandes [facility] for treatment of persistent lower back pain. MRI of the lumbar spine revealed postsurgical change from prior laminectomies at L3-L4 and L4-L5.  L2-L3: Facet hypertrophy. No significant spinal or neuroforaminal stenosis.  L5-S1: Modic endplate changes. On August 21, 2023 he underwent bilateral lumbar medial branch rhizotomies, from which he has reported 100% pain relief of her mechanical lower back pain along with minimal functional improvement as he continued experiencing symptoms related to lumbar postlaminectomy syndrome, painful peripheral polyneuropathy, and PAD with vascular claudication. Chris Patel failed to obtain pain relief with conservative measures including oral analgesics, opioids, topical analgesics, ice, heat, physical therapy, physical therapist directed home exercise program HEP, to name a few, interventional pain management measures, and previous surgical measures. I had a lengthy conversation with Mr. Chris Patel regarding his chronic pain condition and potential therapeutic options including risks, benefits, alternative therapies, to name a few. Chris Patel' chronic pain condition was significantly improved during his SCS trial. Patient is very satisfied with the outcome of his SCS trial and would like to move forward with implantation of a spinal cord stimulator device. Patient reports that he has completed all preoperative recommendations for infection control prior to surgery; Hibiclens showers daily 5 days before surgery; Chlorhexidine towelettes over the surgical regions the night before and the day of surgery; intranasal Bactroban 2% twice daily for 5 days before surgery. He has also completed CBC, MRSA screening, CMP, UA (all WNL). PT, PTT.     PROCEDURE SUMMARY: After explaining all of the risks and benefits of the procedure, an  informed consent was obtained. History and physical examination were updated, and the patient's surgical site was confirmed with the patient and marked in the holding room accordingly. The IPG pocket site was marked with a skin marker with the patient in sitting and standing position. The patient received antibiotic prophylaxis as per hospital's current protocol prior to skin incision. The patient was transferred to the operating room and placed on the operating room table in prone position. Time out was completed. All pressure sensitive areas were carefully padded. The thoracolumbar and gluteal areas were prepped with ChloraPrep followed by DuraPrep, and draped with sterile towels, Ioban, and universal drapes.  The skin and subcutaneous tissues overlying the T11-T12 interlaminar space (target) were anesthetized with ten ml of 0.25% bupivacaine with epinephrine 1:200,000. A 4-cm vertical incision was performed two cm lateral to the spinous processes on the left paravertebral region with a 10-blade.  Blunt and sharp surgical instruments were utilized until the lumbar paravertebral fascia was visualized. Hemostasis was achieved by bipolar electrocautery. Two 14-gauge Tuohy needles were advanced from about two vertebral levels below target, with a 10-degree needle angle from a left paramedian approach into the posterior epidural space at T11-T12 without difficulties. Aspiration was negative for blood and/or CSF. AP and lateral X-ray view were obtained confirming appropriate needle placement. Subsequently, two spinal cord stimulator leads were advanced without difficulties up to the level of the superior endplate of the T7 vertebral body level. The leads were placed slightly off the anatomical midline on each side of the posterior epidural space. AP and lateral x-ray views were obtained and placed in the patient's chart. A spinal cord stimulator trial was performed on the operating room table and pleasant stimulation  was perceived by the patient in all of the chronic pain areas. Four Nurolon 0 stitches were placed in the paravertebral fascia to anchor the leads. Then, the stylettes were removed from the leads without difficulty. Two Medtronic Injex Anchors were advanced through the leads and buried under the lumbar fascia. Anchors were tightened to the leads with Nuralon 0 and sutured to the lumbar paravertebral fascia with two Nurolon 0 per anchor. X-rays were obtained and confirmed appropriate lead placement at the original placement site. There was no evidence of lead movement while gently pulling the leads under direct C-arm fluoroscopic guidance. Attention was directed to the IPG pocket site. The skin and subcutaneous tissues were anesthetized with ten ml of 0.25% bupivacaine with epinephrine 1:200,000. A five cm horizontal incision was performed on the left lumbar paravertebral region using a 10 blade. The subcutaneous pocket was created by utilizing blunt and sharp surgical instruments. Hemostasis was achieved by bipolar electrocautery. Both surgical wounds were irrigated with Irrisept followed by antibiotic solution with vancomycin. The leads were tunneled to the IPG pocket site using Tuohy needles provided by the  without difficulties. Both surgical wounds were irrigated with Irrisept and antibiotic solution with vancomycin. The leads were cleaned with sterile water and dried with sterile towels. The leads were connected to the IPG. The connection was secured using the wrench provided by the . Impedances were checked confirming normal impedances for both leads and all contacts. Both surgical wounds were infiltrated with 20 ml of Exparel diluted in 20 ml of PFNS. The IPG was placed inside of a TYRX envelope. The IPG was anchored to the fascia with two Nurolon 0 stitches.  A large relaxation loop of the leads was created in the left paravertebral region, and behind the IPG to decrease lead tension  with movement. The IPG was placed with the programmable side facing the skin. The surgical wounds were approximated in two layers with a series of interrupted stitches with Vicryl 2-0 (five at the paravertebral incision, and five at the IPG pocket site). The skin edges were approximated with Nylon 3:0, as series of interrupted stitches (six at the paravertebral incision, six at the IPG site). X-rays of the IPG pocket site confirming appropriate alignment of the leads in the IPG header, the final position of the leads in the epidural space, and the relaxation loop of the leads created in the paravertebral region to decrease lead tension, were obtained and will be available for documentation in the patient's chart. Covaderms were applied to both surgical areas.  An abdominal binder was applied. Fluoroscopy was utilized using low-dose of radiation applying collimation, pulsed mode, and shielding following ALARA recommendations. Fluoroscopy time: 89 seconds.   The patient tolerated the procedure well and without incident.  Upon completion of the procedure, the patient was transferred to the recovery room.       COMPLEX SPINAL CORD STIMULATOR PROGRAMMING:   Complex spinal cord stimulator programming was completed in the recovery room.   Two programs were created:   Program A1 (left lead)  Electrode polarities: 6-, 7+   Amplitude: 5.2 mA     Pulse width: 500 mcs   Rate: 50 Hz      Program A2 (right lead)  Electrode polarities: 10-, 13+   Amplitude: 3.8 mA     Pulse width: 300 mcs   Rate: 50 Hz     Patient experienced significant pain relief with coverage with pleasant paresthesia in all areas of chronic pain (LB/BLE).   A copy of the telemetry report will be scanned in the patient's chart.     PLAN:   1. Follow-up on Wednesday or Thursday for wound check and possible SCS adjustment   2. The patient was discharged from the hospital neurologically intact at baseline and with appropriate discharge instructions:   A. Wear an  abdominal binder around the IPG site over a cotton T-shirt at all times   B. Wear a back brace when out of bed   C. Patient received a prescription for hydrocodone 5/325 mg, 1/2 to 1 tablet every 6 hours for postoperative pain, #10, no refills.   D. Call with any questions or concerns 24/7; come to the ER immediately or call 911 if sudden onset of severe back pain, weakness, loss of bladder or bowel control, and/or if any other major event.   3. The patient and family have been instructed to contact my office with any questions or difficulties. The patient and family understand the plan and agree to proceed accordingly.       Akin Rodriguez MD        EMR Dragon/Transcription disclaimer: Much of this encounter note is an electronic transcription of spoken language to printed text.

## 2024-04-08 NOTE — ANESTHESIA PREPROCEDURE EVALUATION
Anesthesia Evaluation     Patient summary reviewed and Nursing notes reviewed                Airway   Mallampati: II  TM distance: >3 FB  Neck ROM: full  No difficulty expected  Dental - normal exam   (+) upper dentures    Comment: LOWER PARTIAL    Pulmonary - negative pulmonary ROS and normal exam   (+) a smoker Former,  Cardiovascular - negative cardio ROS and normal exam    (+) hypertension, hyperlipidemia      Neuro/Psych- negative ROS  (+) numbness  GI/Hepatic/Renal/Endo - negative ROS   (+) renal disease- CRI    Musculoskeletal (-) negative ROS    (+) back pain  Abdominal  - normal exam    Bowel sounds: normal.   Substance History - negative use  (+) alcohol use     OB/GYN negative ob/gyn ROS         Other   arthritis,   history of cancer      Other Comment: HEARING LOSS              Anesthesia Plan    ASA 3     MAC     (PROPOFOL)  intravenous induction     Anesthetic plan, risks, benefits, and alternatives have been provided, discussed and informed consent has been obtained with: patient.    Plan discussed with CRNA.    CODE STATUS:

## 2024-04-10 ENCOUNTER — TELEPHONE (OUTPATIENT)
Dept: PAIN MEDICINE | Facility: CLINIC | Age: 79
End: 2024-04-10
Payer: MEDICARE

## 2024-04-10 NOTE — TELEPHONE ENCOUNTER
"LVM for pt inquiring how he's doing after his SCS Implant with Dr. Tello on Monday, and his follow up on Thursday at 0830 with Dr. Tello in office.    Relay     \"FOLLOW UP APPOINTMENT WITH DR TELLO THURSDAY AT 0830 AM IN THE OFFICE FROM HIS SCS IMPLANT MONDAY.\"            "

## 2024-04-11 ENCOUNTER — OFFICE VISIT (OUTPATIENT)
Dept: PAIN MEDICINE | Facility: CLINIC | Age: 79
End: 2024-04-11
Payer: MEDICARE

## 2024-04-11 VITALS — BODY MASS INDEX: 23.24 KG/M2 | HEIGHT: 71 IN | WEIGHT: 166 LBS

## 2024-04-11 DIAGNOSIS — Z96.89 STATUS POST INSERTION OF SPINAL CORD STIMULATOR: ICD-10-CM

## 2024-04-11 DIAGNOSIS — R26.9 GAIT DISTURBANCE: ICD-10-CM

## 2024-04-11 DIAGNOSIS — G62.9 POLYNEUROPATHY: ICD-10-CM

## 2024-04-11 DIAGNOSIS — M62.84 SARCOPENIA: ICD-10-CM

## 2024-04-11 DIAGNOSIS — Z96.82 PRESENCE OF NEUROSTIMULATOR: ICD-10-CM

## 2024-04-11 DIAGNOSIS — M96.1 LUMBAR POSTLAMINECTOMY SYNDROME: ICD-10-CM

## 2024-04-11 DIAGNOSIS — M48.062 LUMBAR STENOSIS WITH NEUROGENIC CLAUDICATION: ICD-10-CM

## 2024-04-11 DIAGNOSIS — Z46.2 ENCOUNTER FOR FITTING AND ADJUSTMENT OF NEUROPACEMAKER OF SPINAL CORD: ICD-10-CM

## 2024-04-11 DIAGNOSIS — R53.81 PHYSICAL DECONDITIONING: ICD-10-CM

## 2024-04-11 NOTE — PROGRESS NOTES
"Chief Complaint: \"I am doing well with my stimulator device. I need more coverage in my lower back. My legs feel well.\"      Brief History: Mr. Chris Patel is a 78 y.o. male, who underwent on 04/08/2024 implantation of a spinal cord stimulator device with me with two spinal cord stimulator leads MedSand 9 compact SureScan MRI with the top electrodes at the level of the superior endplate of T7. IPG: Medtronic Tig[employer] with AdaptiveStim (rechargeable). Medtronic TYRX Neuro absorbable antibacterial envelope MRI safe. SCS Device is full body MRI compatible. The device was primarily implanted for treatment of chronic intractable lower back and lower extremity pain. Chris Patel returns to the clinic for his first postoperative visit for evaluation of his surgical wounds, continued assessment of his chronic pain, and possible spinal cord stimulator reprogramming. Chris Patel reports that he has remained afebrile since his surgery and that his surgical wounds are healing well without erythema, drainage, or fluid accumulation. Chris Patel reports minimal incisional pain.  Chris Patel experienced significant postsurgical pain relief from Exparel that lasted for about 48 hours. Chris Patel took 2 tablets of hydrocodone for postoperative pain. Mr. Chris Patel reports more than 50% overall pain relief along with remarkable functional improvement with the use of his stimulator device. Chris Patel reports relief of his previously severe neurogenic claudication. Chris Patel also reports significant improvement of his nocturnal pain. Chris Patel is doing well and feels satisfied with his SCS device and reports excellent coverage in all areas of his chronic pain but would like to experience more coverage with his stimulator device of his lower back pain.   Pain level is rated as 1/10 with the use of the spinal cord stimulator device.  Surgical pain 4/10  Patient level ranges from 0/10 to 3/10 with the use " of the spinal cord stimulator device.    Patient reports improvement of his pain, numbness, and weakness in the lower extremities with the use of his SCS device.    Medtronic SCS Device:  Date of implantation: 04/08/2024  Surgeon: Akin Rodriguez MD    Indications: Treatment of chronic intractable lower back and lower extremity pain.    Leads: two spinal cord stimulator leads Medtronic LinguaSysris compact SureScan MRI (Full Body MRI Compatible) 60 cm SN [id number] Exp 03/18/2028 and SN [id number] Exp 03/18/2028 at the superior endplate of the T7 vertebral body  IPG: Medtronic Intellis with AdaptiveStim rechargeable IPG SN [id number] Exp 01/28/2025  Medtronic TYRX Neuro absorbable antibacterial envelope MRI safe.   System: Full Body MRI compatible -1.5 Monique     Please see previous OV notes for a comprehensive history.   Chronic Pain History: Mr. Chris Patel, originally referred by Dr. Herminio Hernandes in consultation of chronic intractable lower back and lower extremity pain.  He reported a longstanding history of chronic intractable lower back pain. He previously underwent epidural injections in September 2021, without sustained relief. On October 18, 2021, he underwent lumbar laminectomies with medial facetectomies and foraminotomies at L3-L4 and L4-L5 by Dr. Arevalo. By June 2022, he saw neurosurgery and was complaining of residual symptoms. He was seen by [facility]. [facility] with [facility] for treatment of persistent lower back pain.  Dr. Clement requested consideration for medial branch blocks and RFA, if beneficial Dr. Clement would consider an L3-S1 lumbar fusion.  MRI of the lumbar spine revealed postsurgical change from prior laminectomies at L3-L4 and L4-L5. L2-L3: facet hypertrophy with loss of disc height, no significant spinal or neuroforaminal stenosis.  L5-S1 Modic endplate changes. On August 21, 2023 he underwent bilateral lumbar medial branch rhizotomies, from which he  reported 100% pain relief with modest functional improvement.  He was still complaining from symptoms related to lumbar postlaminectomy syndrome, painful peripheral polyneuropathy and PAD. He failed to obtain pain relief with surgical measures ans well as conservative measures including oral analgesics, opioids, topical analgesics, ice, heat, physical therapy, physical therapist directed home exercise program HEP (ongoing), to name a few.  Mr. Chris Patel underwent a successful spinal cord stimulator trial the week of 03/18/2024. Mr. Chris Patel reported 75% pain relief of his chronic lower back pain, his lower extremity pain, and his diabetic neuropathic pain along with remarkable functional improvement with the use of his stimulator device. Patient reports significant relief of his previously severe neurogenic claudication. In addition, patient reports improvement of his nocturnal pain with the use of the SCS device. Chris Patel was able to operate his stimulator device without difficulties. He did not take additional analgesics throughout his spinal cord stimulator trial. Patient was very satisfied with the outcome of his SCS trial and decided to move forward with implantation of a spinal cord stimulator device.   Pain Description: Constant bilateral leg pain from the knee down with intermittent exacerbation, described as burning, crushing, sharpand throbbing sensation.   Radiation of Pain: The pain radiates from the knees down to his feet and it is associated with numbness and weakness in his legs  Pain intensity today: 3/10   Average pain intensity last week: 6/10  Pain intensity ranges from: 3/10 to 9/10  Aggravating factors: Pain increases with standing and walking. Patient describes neurogenic severe claudication. Patient does not use a cane or a walker  Alleviating factors: Pain decreases with sitting down, lying down  Associated Symptoms:   Patient reports pain, numbness and weakness from his knees  "down to his feet due to peripheral polyneuropathy   Patient denies any new bladder or bowel problems.   Patient reports difficulties with his balance and reports some recent falls.   Pain interferes with ADLs, general activities (ability to walk, stand, transition from different positions), and affects patient's quality of life  Pain does not interfere with sleep      Review of previous therapies and additional medical records:  Chris Patel has already failed the following measures, including:   Conservative Measures: Oral analgesics, opioids, topical analgesics, ice, heat, physical therapy, physical therapist directed home exercise program HEP (ongoing)  Interventional Measures:   08/09/2023: First set of diagnostic bilateral lumbar MBBs: 80-90% pain relief 9/10 to 1-2/10 lasting for about 12 hours  08/16/2023: Second set of diagnostic bilateral lumbar MBBs: % pain relief and functional improvement lasting for 12-18 hours  08/21/2023: Bilateral lumbar medial branch rhizotomies: 100% ongoing pain relief and modest functional improvement.  Prior to lumbar surgery:  09/2021: Therapeutic bilateral L3-L4 transforaminal epidural steroid injections    07/19/2021: DxTx bilateral L3-L4 transforaminal epidural steroid injection   Surgical Measures:  10/18/2021: lumbar laminectomies with medial facetectomies and foraminotomies at L3-L4 and L4-L5 by Dr. Kenan Arevalo.   [facility] psychological evaluation with Dr. Casey Piña on 2/26/2024, per note: \"From a psychological perspective, patient is considered to be an appropriate candidate for spinal cord stimulator at this time.\"  Chris Patel underwent spine neurosurgical consultation with Teresa Coreas PA-C for Dr. Herminio Clement with [facility] and was found to be a potential surgical candidate for PLIF L3-S1.  Chris Patel presents with significant comorbidities including osteoarthritis, chronic kidney disease, hyperlipidemia, " hearing loss, hypertension, psoriasis, tobacco abuse  In terms of current analgesics, Chris HERNANDEZ Jorge takes: Gabapentin. Patient also takes trazodone  I have reviewed Gabriel Report consistent with medication reconciliation.  SOAPP:  High Risk         Global Pain Scale 08-08 2023 01-11 2024               Pain 20 12               Feelings 6 0               Clinical outcomes 12 3               Activities 20 10               GPS Total: 58 25                  The [place] Back Pain Disability Scale   DATE 08-08 2023 01-11 2024               Sleep through the night 3 1               Turn over in bed 0 0               Get out of bed 5 0               Make your bed 0 0               Put on socks (pantyhose) 1 0               Ride in a car 0 0               Sit in a chair for several hours 2 1               Stand up for 20-30 minutes 2 1               Climb one flight of stairs 2 1               Walk a few blocks (200-300 yards)  5 1               Walk several miles 5 2               Run one block (about 50 yards) 5 2               Take food out of the refrigerator 0 0               Reach up to high shelves 0 1               Move a chair 1 1               Pull or push heavy doors 1 1               Bend over to clean the bathtub 3 1               Throw a ball 0 1               Carry two bags of groceries 3 1               Lift and carry a heavy suitcase 5 1               Total score 43 16                  Diagnostic Studies:   MRI of the thoracic spine without contrast 2/8/2024: Mild spondylosis, no significant spinal canal neuroforaminal stenosis.  Lumbar spine x-rays flexion-extension views 2/8/2024: Diffuse degenerative disc disease with lower lumbar facet arthritis.  No significant listhesis or instability.  MRI of the cervical spine without contrast 07/13/2023: multilevel cervical spondylosis.   C2-C3: Facet hypertrophy, disc osteophyte complex. Mild canal stenosis.  C3-C4: Facet hypertrophy, disc osteophyte complex.  Mild canal stenosis. Moderate bilateral foraminal stenosis  C4-C5: Facet hypertrophy, disc osteophyte complex. Mild canal stenosis. Moderate to severe bilateral foraminal stenosis  C5-C6: Disc osteophyte complex, facet hypertrophy. Mild canal stenosis.  Severe bilateral foraminal stenosis  C6-C7: Facet hypertrophy, disc osteophyte complex. Mild canal stenosis. Moderate bilateral foraminal stenosis  MRI of the lumbar spine without contrast 04/20/2023:  Multilevel spondylosis with degenerative disc disease and facet hypertrophy at all lumbar levels.   T11-T12, T12-L1, L1-L2: No significant canal or foraminal stenosis  L2-L3: Slight retrolisthesis, disc bulge, facet hypertrophy.  Mild canal stenosis and mild foraminal stenosis  L3-L4: Evidence of previous laminectomy.  Disc bulge.  Facet hypertrophy.  No significant canal stenosis.  Mild foraminal stenosis  L4-L5: Evidence of previous laminectomy.  Disc bulge.  Facet hypertrophy.  No significant canal stenosis.  Mild foraminal stenosis   L5-S1: Disc bulge, facet hypertrophy.  No significant canal stenosis.  Mild left foraminal stenosis    The following portions of the patient's history were reviewed and updated as appropriate: problem list, past medical history, past surgery history, social history, family history, medications, and allergies    Review of Systems      Patient Active Problem List   Diagnosis    Lumbar stenosis with neurogenic claudication    Spondylosis of lumbar region without myelopathy or radiculopathy    Degeneration of lumbar or lumbosacral intervertebral disc    Physical deconditioning    Gait disturbance    FRANCISCO JAVIER (acute kidney injury)    Bilateral low back pain without sciatica    Body mass index (BMI) of 20 to 24    Cigarette smoker    Decreased GFR    Mixed hyperlipidemia    Hypertension, benign    Screening for depression    Lumbar postlaminectomy syndrome    Polyneuropathy    Sarcopenia    Encounter for fitting and adjustment of neuropacemaker  of spinal cord    Presence of neurostimulator    Status post insertion of spinal cord stimulator       Past Medical History:   Diagnosis Date    Arthritis     Back pain     Cancer     pre skin cancerous areas - few     Full dentures     full top and partial at bottom     High cholesterol     [place] (hard of hearing)     bilat hearing aids     Hypertension     Psoriasis     Wears glasses     Wears hearing aid     bilat          Past Surgical History:   Procedure Laterality Date    ANKLE SURGERY Right     fracture    HERNIA REPAIR      umbi;lical hernia - with mesh     LUMBAR LAMINECTOMY DISCECTOMY DECOMPRESSION N/A 10/18/2021    Procedure: LUMBAR LAMINECTOMY  L3-5;  Surgeon: Kenan Arevalo MD;  Location:  [facility];  Service: Neurosurgery;  Laterality: N/A;    SPINAL CORD STIMULATOR IMPLANT N/A 2024    Procedure: SPINAL CORD STIMULATOR INSERTION PHASE 2;  Surgeon: Akin Rodriguez MD;  Location:  [facility];  Service: Pain Management;  Laterality: N/A;         Family History   Family history unknown: Yes         Social History     Socioeconomic History    Marital status:    Tobacco Use    Smoking status: Former     Current packs/day: 0.00     Types: Cigarettes     Quit date:      Years since quittin.2    Smokeless tobacco: Former    Tobacco comments:     Smoked 30 years off and on   Vaping Use    Vaping status: Never Used   Substance and Sexual Activity    Alcohol use: Yes     Alcohol/week: 21.0 standard drinks of alcohol     Types: 21 Shots of liquor per week     Comment: bourbon    Drug use: Never    Sexual activity: Defer           Current Outpatient Medications:     atorvastatin (LIPITOR) 40 MG tablet, Take 1 tablet by mouth every night at bedtime., Disp: , Rfl:     gabapentin (NEURONTIN) 600 MG tablet, Take 1 tablet by mouth 2 (Two) Times a Day., Disp: , Rfl:     HYDROcodone-acetaminophen (NORCO) 5-325 MG per tablet, Take 1 tablet by mouth Every 8 (Eight) Hours As Needed for Moderate Pain for  "up to 15 doses., Disp: 15 tablet, Rfl: 0    HYDROcodone-acetaminophen (NORCO) 5-325 MG per tablet, 1/2 to 1 TAB QID PRN SEVERE POSTOP PAIN, Disp: 10 tablet, Rfl: 0    lisinopril (PRINIVIL,ZESTRIL) 40 MG tablet, Take 1 tablet by mouth Daily., Disp: , Rfl:     naloxone (NARCAN) 4 MG/0.1ML nasal spray, Call 911. Don't prime. [place] in 1 nostril for overdose. Repeat in 2-3 minutes in other nostril if no or minimal breathing/responsiveness., Disp: 2 each, Rfl: 0    Risankizumab-rzaa (SKYRIZI, 150 MG DOSE, SC), Inject 75 mg under the skin into the appropriate area as directed. Every 3 months-   Ld 8th, Disp: , Rfl:     sildenafil (VIAGRA) 100 MG tablet, Take 1 tablet by mouth Daily., Disp: , Rfl:       Allergies   Allergen Reactions    Penicillins Hives         Ht 179.1 cm (70.5\")   Wt 75.3 kg (166 lb)   BMI 23.48 kg/m²       Physical Exam   Constitutional: Patient appears well-developed, well-nourished, well-hydrated  HEENT: Head: Normocephalic and atraumatic  Eyes: Conjunctivae and lids are normal  Pupils: Equal, round, reactive to light  Musculoskeletal   Gait and station: Gait evaluation demonstrated an improved gait, shuffling, less stooping    Neurological:   Patient is alert and oriented to person, place, and time.   Speech: Normal.   Cortical function: Normal mental status.   Motor strength: 5/5  Motor Tone: Normal .   Involuntary movements: None.   Skin and subcutaneous tissue: Skin is warm and intact. No rash noted. No cyanosis. The surgical wounds are healing well with appropriate epithelization and without erythema, drainage or fluid accumulation  Psychiatric: Judgment and insight: Normal. Recent and remote memory: Intact. Mood and affect: Normal.     Analysis of the spinal cord stimulator device with simple spinal cord stimulator reprogramming with simple spinal cord stimulator reprogramming   Patient used the Medtronic spinal cord stimulator device 100% of the time. Analysis of impedence reveals normal " impedence for all contacts. The spinal cord stimulator device was reprogrammed under my direct supervision and two programs were adjusted by increasing amplitudes, in the following fashion;    Program A1 (left lead)  Electrode polarities: 6-, 7+   Amplitude: 5.2 mA     Pulse width: 500 mcs   Rate: 50 Hz      Program A2 (right lead)  Electrode polarities: 10-, 13+   Amplitude: 4.8 mA     Pulse width: 300 mcs   Rate: 50 Hz     Patient experienced significant pain relief with coverage with pleasant paresthesia in all areas of chronic pain.  Time spent reprogramming: 3 minutes  A copy of the telemetry report will be scanned in the patient's chart.    Dressing change: Dressings were removed. The surgical wounds look unremarkable without erythema, drainage, or fluid accumulation. The wounds were cleansed with alcohol. Covaderm were applied.    ASSESSMENT:   1. Lumbar postlaminectomy syndrome    2. Lumbar stenosis with neurogenic claudication    3. Polyneuropathy    4. Sarcopenia    5. Gait disturbance    6. Physical deconditioning    7. Status post insertion of spinal cord stimulator    8. Presence of neurostimulator    9. Encounter for fitting and adjustment of neuropacemaker of spinal cord        PLAN/MEDICAL DECISION MAKING: Patient's chronic pain condition has been significantly improved since implantation of his SCS device.   1. Follow up on 04/23/2024 for removal of stitches, evaluation of patient's chronic pain, and possible SCS reprogramming.   2. Wear abdominal binder at all times, and back brace when out of bed.   3. Patient may shower and change his dressings.  Chris MARY Patel has been instructed to carefully clean the surgical wounds with chlorhexidine, pat dry and apply and maintain sterile occlusive dressings at all times except when showering. Do not submerge in water (baths, pools, etc) for 4 weeks.   4. The patient has been instructed to contact my office with any questions or difficulties. The patient  understands the plan and agrees to proceed accordingly.     The patient has a documented plan of care to address chronic pain. Chris Patel reports a pain score of 0-1/10 (chronic pain) 5/10 (postop pain).  Given his pain assessment as noted, treatment options were discussed and the following options were decided upon as a follow-up plan to address the patient's pain: continuation of current treatment plan for pain, educational materials on pain management, home exercises and therapy, use of non-medical modalities (ice, heat, stretching and/or behavior modifications), and use of neuromodulation therapy .              Pain Management Panel           No data to display                 LINDA query complete. LINDA reviewed by Akin Rodriguez MD.     Pain Medications               gabapentin (NEURONTIN) 600 MG tablet Take 1 tablet by mouth 2 (Two) Times a Day.    HYDROcodone-acetaminophen (NORCO) 5-325 MG per tablet Take 1 tablet by mouth Every 8 (Eight) Hours As Needed for Moderate Pain for up to 15 doses.    HYDROcodone-acetaminophen (NORCO) 5-325 MG per tablet 1/2 to 1 TAB QID PRN SEVERE POSTOP PAIN             No orders of the defined types were placed in this encounter.       Please note that portions of this note were completed with a voice recognition program.   Any copied data in any portion of my note has been reviewed by myself and accurate.     The 21st Century Cures Act makes medical notes like this available to patients in the interest of transparency. This is a medical document intended as peer to peer communication. It is written in medical language and may contain abbreviations or verbiage that are unfamiliar. It may appear blunt or direct. Medical documents are intended to carry relevant information, facts as evident, and the clinical opinion of the practitioner.     Akin Rodriguez MD    Patient Care Team:  Shannan Rivera MD as PCP - General (Internal Medicine & Pediatrics)  Shannan Rivera MD  as Referring Physician (Internal Medicine & Pediatrics)  Akin Rodriguez MD as Consulting Physician (Pain Medicine)     No orders of the defined types were placed in this encounter.        No future appointments.

## 2024-04-23 ENCOUNTER — OFFICE VISIT (OUTPATIENT)
Dept: PAIN MEDICINE | Facility: CLINIC | Age: 79
End: 2024-04-23
Payer: MEDICARE

## 2024-04-23 VITALS — BODY MASS INDEX: 23.24 KG/M2 | HEIGHT: 71 IN | WEIGHT: 166 LBS

## 2024-04-23 DIAGNOSIS — M48.062 LUMBAR STENOSIS WITH NEUROGENIC CLAUDICATION: ICD-10-CM

## 2024-04-23 DIAGNOSIS — M96.1 LUMBAR POSTLAMINECTOMY SYNDROME: ICD-10-CM

## 2024-04-23 DIAGNOSIS — M62.84 SARCOPENIA: ICD-10-CM

## 2024-04-23 DIAGNOSIS — R26.9 GAIT DISTURBANCE: ICD-10-CM

## 2024-04-23 DIAGNOSIS — Z96.82 PRESENCE OF NEUROSTIMULATOR: ICD-10-CM

## 2024-04-23 DIAGNOSIS — R53.81 PHYSICAL DECONDITIONING: ICD-10-CM

## 2024-04-23 DIAGNOSIS — G62.9 POLYNEUROPATHY: ICD-10-CM

## 2024-04-23 DIAGNOSIS — Z46.2 ENCOUNTER FOR FITTING AND ADJUSTMENT OF NEUROPACEMAKER OF SPINAL CORD: ICD-10-CM

## 2024-04-23 PROCEDURE — 1125F AMNT PAIN NOTED PAIN PRSNT: CPT | Performed by: ANESTHESIOLOGY

## 2024-04-23 PROCEDURE — 1160F RVW MEDS BY RX/DR IN RCRD: CPT | Performed by: ANESTHESIOLOGY

## 2024-04-23 PROCEDURE — 1159F MED LIST DOCD IN RCRD: CPT | Performed by: ANESTHESIOLOGY

## 2024-04-23 PROCEDURE — 99213 OFFICE O/P EST LOW 20 MIN: CPT | Performed by: ANESTHESIOLOGY

## 2024-04-23 PROCEDURE — 95972 ALYS CPLX SP/PN NPGT W/PRGRM: CPT | Performed by: ANESTHESIOLOGY

## 2024-04-23 NOTE — PROGRESS NOTES
"Chief Complaint: \"I'm doing great with the stimulator. My legs feel great. I need more coverage in my lower back.\"        Brief History: Mr. Chris Patel is a 78 y.o. male, who underwent on 04/08/2024 implantation of a spinal cord stimulator device with me with two spinal cord stimulator leads [employer] compact SureScan MRI with the top electrodes at the level of the superior endplate of T7. IPG: Medtronic Mimosais with AdaptiveStim (rechargeable). Medtronic TYRX Neuro absorbable antibacterial envelope MRI safe. SCS Device is full body MRI compatible. The device was primarily implanted for treatment of chronic intractable lower back and lower extremity pain. Chris Patel returns to the clinic for his first postoperative visit for evaluation of his surgical wounds, continued assessment of his chronic pain, and possible spinal cord stimulator reprogramming. Chris Patel reports that he has remained afebrile since his surgery and that his surgical wounds are healing well without erythema, drainage, or fluid accumulation. Chris Patel reports minimal incisional pain.  Chris Patel experienced significant postsurgical pain relief from Exparel that lasted for about 48 hours. Chris Patel took 2 tablets of hydrocodone for postoperative pain. Mr. Chris Patel reports more than 50% overall pain relief along with remarkable functional improvement with the use of his stimulator device. Chris Patel reports relief of his previously severe neurogenic claudication. Chris Patel also reports significant improvement of his nocturnal pain. Chris Patel is doing well and feels satisfied with his SCS device and reports excellent coverage in all areas of his chronic pain but would like to experience more coverage with his stimulator device of his lower back pain.   Pain level is rated as 1/10 with the use of the spinal cord stimulator device.  Surgical pain 0/10  Patient level ranges from 0/10 to 2/10 with the use of " the spinal cord stimulator device.    Patient reports improvement of his pain, numbness, and weakness in the lower extremities with the use of his SCS device.    Medtronic SCS Device:  Date of implantation: 04/08/2024  Surgeon: Akin Rodriguez MD    Indications: Treatment of chronic intractable lower back and lower extremity pain.    Leads: two spinal cord stimulator leads Medtronic inSellyris compact SureScan MRI (Full Body MRI Compatible) 60 cm SN [id number] Exp 03/18/2028 and SN [id number] Exp 03/18/2028 at the superior endplate of the T7 vertebral body  IPG: Medtronic Intellis with AdaptiveStim rechargeable IPG SN [id number] Exp 01/28/2025  Medtronic TYRX Neuro absorbable antibacterial envelope MRI safe.   System: Full Body MRI compatible -1.5 Monique     Please see previous OV notes for a comprehensive history.   Chronic Pain History: Mr. Chris Patel, originally referred by Dr. Herminio Hernandes in consultation of chronic intractable lower back and lower extremity pain.  He reported a longstanding history of chronic intractable lower back pain. He previously underwent epidural injections in September 2021, without sustained relief. On October 18, 2021, he underwent lumbar laminectomies with medial facetectomies and foraminotomies at L3-L4 and L4-L5 by Dr. Arevalo. By June 2022, he saw neurosurgery and was complaining of residual symptoms. He was seen by Dr. Emerson with [facility] for treatment of persistent lower back pain.  Dr. Clement requested consideration for medial branch blocks and RFA, if beneficial Dr. Clement would consider an L3-S1 lumbar fusion.  MRI of the lumbar spine revealed postsurgical change from prior laminectomies at L3-L4 and L4-L5. L2-L3: facet hypertrophy with loss of disc height, no significant spinal or neuroforaminal stenosis.  L5-S1 Modic endplate changes. On August 21, 2023 he underwent bilateral lumbar medial branch rhizotomies, from which he reported  100% pain relief with modest functional improvement.  He was still complaining from symptoms related to lumbar postlaminectomy syndrome, painful peripheral polyneuropathy and PAD. He failed to obtain pain relief with surgical measures ans well as conservative measures including oral analgesics, opioids, topical analgesics, ice, heat, physical therapy, physical therapist directed home exercise program HEP (ongoing), to name a few.  Mr. Chris Patel underwent a successful spinal cord stimulator trial the week of 03/18/2024. Mr. Chris Patel reported 75% pain relief of his chronic lower back pain, his lower extremity pain, and his diabetic neuropathic pain along with remarkable functional improvement with the use of his stimulator device. Patient reports significant relief of his previously severe neurogenic claudication. In addition, patient reports improvement of his nocturnal pain with the use of the SCS device. Chris Patel was able to operate his stimulator device without difficulties. He did not take additional analgesics throughout his spinal cord stimulator trial. Patient was very satisfied with the outcome of his SCS trial and decided to move forward with implantation of a spinal cord stimulator device.   Pain Description: Constant bilateral leg pain from the knee down with intermittent exacerbation, described as burning, crushing, sharpand throbbing sensation.   Radiation of Pain: The pain radiates from the knees down to his feet and it is associated with numbness and weakness in his legs  Pain intensity today: 3/10   Average pain intensity last week: 6/10  Pain intensity ranges from: 3/10 to 9/10  Aggravating factors: Pain increases with standing and walking. Patient describes neurogenic severe claudication. Patient does not use a cane or a walker  Alleviating factors: Pain decreases with sitting down, lying down  Associated Symptoms:   Patient reports pain, numbness and weakness from his knees down to  "his feet due to peripheral polyneuropathy   Patient denies any new bladder or bowel problems.   Patient reports difficulties with his balance and reports some recent falls.   Pain interferes with ADLs, general activities (ability to walk, stand, transition from different positions), and affects patient's quality of life  Pain does not interfere with sleep      Review of previous therapies and additional medical records:  Chris Patel has already failed the following measures, including:   Conservative Measures: Oral analgesics, opioids, topical analgesics, ice, heat, physical therapy, physical therapist directed home exercise program HEP (ongoing)  Interventional Measures:   08/09/2023: First set of diagnostic bilateral lumbar MBBs: 80-90% pain relief 9/10 to 1-2/10 lasting for about 12 hours  08/16/2023: Second set of diagnostic bilateral lumbar MBBs: % pain relief and functional improvement lasting for 12-18 hours  08/21/2023: Bilateral lumbar medial branch rhizotomies: 100% ongoing pain relief and modest functional improvement.  Prior to lumbar surgery:  09/2021: Therapeutic bilateral L3-L4 transforaminal epidural steroid injections    07/19/2021: DxTx bilateral L3-L4 transforaminal epidural steroid injection   Surgical Measures:  10/18/2021: lumbar laminectomies with medial facetectomies and foraminotomies at L3-L4 and L4-L5 by Dr. Kenan Arevalo.   [facility] psychological evaluation with Dr. Casey Piña on 2/26/2024, per note: \"From a psychological perspective, patient is considered to be an appropriate candidate for spinal cord stimulator at this time.\"  Chris Patel underwent spine neurosurgical consultation with Teresa Coreas PA-C for Dr. Herminio Clement with [facility] and was found to be a potential surgical candidate for PLIF L3-S1.  Chris Patel presents with significant comorbidities including osteoarthritis, chronic kidney disease, hyperlipidemia, hearing loss, " hypertension, psoriasis, tobacco abuse  In terms of current analgesics, Chris Patel takes: Gabapentin. Patient also takes trazodone  I have reviewed Gabriel Report consistent with medication reconciliation.  SOAPP:  High Risk      PHQ-2 Depression Screening  Little interest or pleasure in doing things?     Feeling down, depressed, or hopeless?     PHQ-2 Total Score         PHQ-9 Depression Screening  Little interest or pleasure in doing things?     Feeling down, depressed, or hopeless?     Trouble falling or staying asleep, or sleeping too much?     Feeling tired or having little energy?     Poor appetite or overeating?     Feeling bad about yourself - or that you are a failure or have let yourself or your family down?     Trouble concentrating on things, such as reading the newspaper or watching television?     Moving or speaking so slowly that other people could have noticed? Or the opposite - being so fidgety or restless that you have been moving around a lot more than usual?     Thoughts that you would be better off dead, or of hurting yourself in some way?     PHQ-9 Total Score     If you checked off any problems, how difficult have these problems made it for you to do your work, take care of things at home, or get along with other people?         Pain Self-Efficacy Questionnaire (PSEQ)  ITEM 08-08  2023 01-11 2024 04-23 2024         I can enjoy things despite the pain. 0 3  3         I can do most of the household chores (tidying up, washing dishes, etc), despite the pain. 6 4  5         I can socialize with my friends or family members as often as I used to do, despite the pain. 6 4  5         I can cope with my pain in most situations. 6 4  6         I can do some form of work, despite the pain (includes housework, paid, and unpaid work). 6 5  6         I can still do many of the things I enjoy doing, such as hobbies or leisure activity despite pain. 6 4  6         I can cope with my pain without  medications. 6 4  6         I can accomplish most of my goals in life despite the pain. 6 3  6         I can live in a normal lifestyle, despite the pain. 6 2  6         I can gradually become more active, despite the pain. 5 4  5         TOTAL SCORE 53/60 37/60 54/60              Global Pain Scale 08-08 2023 01-11 2024 04-23 2024             Pain 20 12 10              Feelings 6 0 0              Clinical outcomes 12 3 3              Activities 20 10 6              GPS Total: 58 25 19                 The [place] Back Pain Disability Scale   DATE 08-08 2023 01-11 2024 04-23 2024              Sleep through the night 3 1 1             Turn over in bed 0 0 0              Get out of bed 5 0 0              Make your bed 0 0 0              Put on socks (pantyhose) 1 0 0              Ride in a car 0 0 0              Sit in a chair for several hours 2 1 0              Stand up for 20-30 minutes 2 1 0              Climb one flight of stairs 2 1 1              Walk a few blocks (200-300 yards)  5 1 1              Walk several miles 5 2 2              Run one block (about 50 yards) 5 2 2              Take food out of the refrigerator 0 0 0              Reach up to high shelves 0 1 0              Move a chair 1 1 0              Pull or push heavy doors 1 1 0              Bend over to clean the bathtub 3 1 3              Throw a ball 0 1 0              Carry two bags of groceries 3 1 1              Lift and carry a heavy suitcase 5 1 1              Total score 43 16 12                 Diagnostic Studies:   MRI of the thoracic spine without contrast 2/8/2024: Mild spondylosis, no significant spinal canal neuroforaminal stenosis.  Lumbar spine x-rays flexion-extension views 2/8/2024: Diffuse degenerative disc disease with lower lumbar facet arthritis.  No significant listhesis or instability.  MRI of the cervical spine without contrast 07/13/2023: multilevel cervical spondylosis.   C2-C3: Facet hypertrophy, disc osteophyte  complex. Mild canal stenosis.  C3-C4: Facet hypertrophy, disc osteophyte complex. Mild canal stenosis. Moderate bilateral foraminal stenosis  C4-C5: Facet hypertrophy, disc osteophyte complex. Mild canal stenosis. Moderate to severe bilateral foraminal stenosis  C5-C6: Disc osteophyte complex, facet hypertrophy. Mild canal stenosis.  Severe bilateral foraminal stenosis  C6-C7: Facet hypertrophy, disc osteophyte complex. Mild canal stenosis. Moderate bilateral foraminal stenosis  MRI of the lumbar spine without contrast 04/20/2023:  Multilevel spondylosis with degenerative disc disease and facet hypertrophy at all lumbar levels.   T11-T12, T12-L1, L1-L2: No significant canal or foraminal stenosis  L2-L3: Slight retrolisthesis, disc bulge, facet hypertrophy.  Mild canal stenosis and mild foraminal stenosis  L3-L4: Evidence of previous laminectomy.  Disc bulge.  Facet hypertrophy.  No significant canal stenosis.  Mild foraminal stenosis  L4-L5: Evidence of previous laminectomy.  Disc bulge.  Facet hypertrophy.  No significant canal stenosis.  Mild foraminal stenosis   L5-S1: Disc bulge, facet hypertrophy.  No significant canal stenosis.  Mild left foraminal stenosis    The following portions of the patient's history were reviewed and updated as appropriate: problem list, past medical history, past surgery history, social history, family history, medications, and allergies    Review of Systems      Patient Active Problem List   Diagnosis    Lumbar stenosis with neurogenic claudication    Spondylosis of lumbar region without myelopathy or radiculopathy    Degeneration of lumbar or lumbosacral intervertebral disc    Physical deconditioning    Gait disturbance    FRANCISCO JAVIER (acute kidney injury)    Bilateral low back pain without sciatica    Body mass index (BMI) of 20 to 24    Cigarette smoker    Decreased GFR    Mixed hyperlipidemia    Hypertension, benign    Screening for depression    Lumbar postlaminectomy syndrome     Polyneuropathy    Sarcopenia    Encounter for fitting and adjustment of neuropacemaker of spinal cord    Presence of neurostimulator    Status post insertion of spinal cord stimulator       Past Medical History:   Diagnosis Date    Arthritis     Back pain     Cancer     pre skin cancerous areas - few     Full dentures     full top and partial at bottom     High cholesterol     [place] (hard of hearing)     bilat hearing aids     Hypertension     Psoriasis     Wears glasses     Wears hearing aid     bilat          Past Surgical History:   Procedure Laterality Date    ANKLE SURGERY Right     fracture    HERNIA REPAIR      umbi;lical hernia - with mesh     LUMBAR LAMINECTOMY DISCECTOMY DECOMPRESSION N/A 10/18/2021    Procedure: LUMBAR LAMINECTOMY  L3-5;  Surgeon: Kenan Arevalo MD;  Location:  [facility];  Service: Neurosurgery;  Laterality: N/A;    SPINAL CORD STIMULATOR IMPLANT N/A 2024    Procedure: SPINAL CORD STIMULATOR INSERTION PHASE 2;  Surgeon: Akin Rodriguez MD;  Location:  [facility];  Service: Pain Management;  Laterality: N/A;         Family History   Family history unknown: Yes         Social History     Socioeconomic History    Marital status:    Tobacco Use    Smoking status: Former     Current packs/day: 0.00     Types: Cigarettes     Quit date:      Years since quittin.3    Smokeless tobacco: Former    Tobacco comments:     Smoked 30 years off and on   Vaping Use    Vaping status: Never Used   Substance and Sexual Activity    Alcohol use: Yes     Alcohol/week: 21.0 standard drinks of alcohol     Types: 21 Shots of liquor per week     Comment: bourbon    Drug use: Never    Sexual activity: Defer           Current Outpatient Medications:     atorvastatin (LIPITOR) 40 MG tablet, Take 1 tablet by mouth every night at bedtime., Disp: , Rfl:     gabapentin (NEURONTIN) 600 MG tablet, Take 1 tablet by mouth 2 (Two) Times a Day., Disp: , Rfl:     HYDROcodone-acetaminophen (NORCO) 5-325 MG per  "tablet, Take 1 tablet by mouth Every 8 (Eight) Hours As Needed for Moderate Pain for up to 15 doses., Disp: 15 tablet, Rfl: 0    HYDROcodone-acetaminophen (NORCO) 5-325 MG per tablet, 1/2 to 1 TAB QID PRN SEVERE POSTOP PAIN, Disp: 10 tablet, Rfl: 0    lisinopril (PRINIVIL,ZESTRIL) 40 MG tablet, Take 1 tablet by mouth Daily., Disp: , Rfl:     naloxone (NARCAN) 4 MG/0.1ML nasal spray, Call 911. Don't prime. [place] in 1 nostril for overdose. Repeat in 2-3 minutes in other nostril if no or minimal breathing/responsiveness., Disp: 2 each, Rfl: 0    Risankizumab-rzaa (SKYRIZI, 150 MG DOSE, SC), Inject 75 mg under the skin into the appropriate area as directed. Every 3 months-   Ld 8th, Disp: , Rfl:     sildenafil (VIAGRA) 100 MG tablet, Take 1 tablet by mouth Daily., Disp: , Rfl:       Allergies   Allergen Reactions    Penicillins Hives         Ht 179.1 cm (70.52\")   Wt 75.3 kg (166 lb)   BMI 23.47 kg/m²       Physical Exam   Constitutional: Patient appears well-developed, well-nourished, well-hydrated  HEENT: Head: Normocephalic and atraumatic  Eyes: Conjunctivae and lids are normal  Pupils: Equal, round, reactive to light  Musculoskeletal   Gait and station: Gait evaluation demonstrated an improved gait, shuffling, less stooping    Neurological:   Patient is alert and oriented to person, place, and time.   Speech: Normal.   Cortical function: Normal mental status.   Motor strength: 5/5  Motor Tone: Normal .   Involuntary movements: None.   Skin and subcutaneous tissue: Skin is warm and intact. No rash noted. No cyanosis. The surgical wounds are healing well with appropriate epithelization and without erythema, drainage or fluid accumulation  Psychiatric: Judgment and insight: Normal. Recent and remote memory: Intact. Mood and affect: Normal.     Analysis of the spinal cord stimulator device with complex spinal cord stimulator reprogramming   Patient used the Medtronic spinal cord stimulator device 100% of the time. " Analysis of impedence reveals normal impedence for all contacts. The spinal cord stimulator device was reprogrammed under my direct supervision and three programs were adjusted by increasing amplitudes, in the following fashion;    Program A1 (left lead)  Electrode polarities: 2+, 3-, 4-, 5+  Amplitude: 5.5 mA     Pulse width: 500 mcs   Rate: 40 Hz      Program A2 (right lead)  Electrode polarities: 10+, 11-, 12-, 13+   Amplitude: 5.8 mA     Pulse width: 500 mcs   Rate: 400 Hz     Program A3 (Lower back)  Electrode polarities: 0-, 2+, 8-, 10+  Amplitude: 5.4 mA     Pulse width: 500 mcs   Rate: 40 Hz     Patient experienced significant pain relief with coverage with pleasant paresthesia in all areas of chronic pain.  Time spent reprogrammin minutes  A copy of the telemetry report will be scanned in the patient's chart.    Removal of stitches: The surgical wounds were cleansed with alcohol. Stitches were removed. There is complete epithelialization of the surgical wounds. There is no erythema, drainage, or fluid accumulation at the surgical sites. There is no tenderness upon mobilization of the IPG in the pocket. Steri-Strips were applied. Covaderms were applied.     ASSESSMENT:   1. Lumbar postlaminectomy syndrome    2. Lumbar stenosis with neurogenic claudication    3. Polyneuropathy    4. Sarcopenia    5. Gait disturbance    6. Physical deconditioning    7. Presence of neurostimulator    8. Encounter for fitting and adjustment of neuropacemaker of spinal cord          PLAN/MEDICAL DECISION MAKING: Patient's chronic pain condition has been significantly improved since implantation of his SCS device.   1. Follow up in 2 weeks for continued evaluation of patient's chronic pain and possible SCS reprogramming.   2. The patient has been instructed to contact my office with any questions or difficulties. The patient understands the plan and agrees to proceed accordingly.     The patient has a documented plan of care  to address chronic pain. Chris Patel reports a pain score of 2/10.  Given his pain assessment as noted, treatment options were discussed and the following options were decided upon as a follow-up plan to address the patient's pain: continuation of current treatment plan for pain, educational materials on pain management, home exercises and therapy, use of non-medical modalities (ice, heat, stretching and/or behavior modifications), and use of neuromodulation therapy .              Pain Management Panel           No data to display                 LINDA query complete. LINDA reviewed by Akin Rodriguez MD.     Pain Medications               gabapentin (NEURONTIN) 600 MG tablet Take 1 tablet by mouth 2 (Two) Times a Day.    HYDROcodone-acetaminophen (NORCO) 5-325 MG per tablet Take 1 tablet by mouth Every 8 (Eight) Hours As Needed for Moderate Pain for up to 15 doses.    HYDROcodone-acetaminophen (NORCO) 5-325 MG per tablet 1/2 to 1 TAB QID PRN SEVERE POSTOP PAIN             No orders of the defined types were placed in this encounter.       Please note that portions of this note were completed with a voice recognition program.   Any copied data in any portion of my note has been reviewed by myself and accurate.     The 21st Century Cures Act makes medical notes like this available to patients in the interest of transparency. This is a medical document intended as peer to peer communication. It is written in medical language and may contain abbreviations or verbiage that are unfamiliar. It may appear blunt or direct. Medical documents are intended to carry relevant information, facts as evident, and the clinical opinion of the practitioner.     Akin Rodriguez MD    Patient Care Team:  Shannan Rivera MD as PCP - General (Internal Medicine & Pediatrics)  Shannan Rivera MD as Referring Physician (Internal Medicine & Pediatrics)  Akin Rodriguez MD as Consulting Physician (Pain Medicine)     No orders of  the defined types were placed in this encounter.        No future appointments.

## 2024-06-17 ENCOUNTER — OFFICE VISIT (OUTPATIENT)
Dept: PAIN MEDICINE | Facility: CLINIC | Age: 79
End: 2024-06-17
Payer: MEDICARE

## 2024-06-17 VITALS — BODY MASS INDEX: 23.04 KG/M2 | HEIGHT: 71 IN | WEIGHT: 164.6 LBS

## 2024-06-17 DIAGNOSIS — R53.81 PHYSICAL DECONDITIONING: ICD-10-CM

## 2024-06-17 DIAGNOSIS — M48.062 LUMBAR STENOSIS WITH NEUROGENIC CLAUDICATION: ICD-10-CM

## 2024-06-17 DIAGNOSIS — Z46.2 ENCOUNTER FOR FITTING AND ADJUSTMENT OF NEUROPACEMAKER OF SPINAL CORD: ICD-10-CM

## 2024-06-17 DIAGNOSIS — M96.1 LUMBAR POSTLAMINECTOMY SYNDROME: ICD-10-CM

## 2024-06-17 DIAGNOSIS — G62.9 POLYNEUROPATHY: ICD-10-CM

## 2024-06-17 DIAGNOSIS — Z96.82 PRESENCE OF NEUROSTIMULATOR: ICD-10-CM

## 2024-06-17 DIAGNOSIS — M47.816 SPONDYLOSIS OF LUMBAR REGION WITHOUT MYELOPATHY OR RADICULOPATHY: ICD-10-CM

## 2024-06-17 RX ORDER — TRAZODONE HYDROCHLORIDE 100 MG/1
1 TABLET ORAL DAILY
COMMUNITY
Start: 2024-05-31

## 2024-06-17 NOTE — PROGRESS NOTES
"Chief Complaint: \"I'm doing great with the stimulator.  He would like to obtain more coverage in his lower back and legs.\"        Brief History: Mr. Chris Patel is a 78 y.o. male, who underwent on 04/08/2024 implantation of a spinal cord stimulator device with Dr. Rordiguez with two spinal cord stimulator leads Medtronic Vectris compact SureScan with the top electrodes at the level of the superior endplate of T7. IPG: Medtronic Intellis with AdaptiveStim (rechargeable). SCS Device is full body MRI compatible. The device was primarily implanted for treatment of chronic intractable lower back and lower extremity pain. Chris Patel returns to the clinic for continued assessment of his chronic pain, and possible spinal cord stimulator reprogramming.   Mr. Chris Patel reports overall no significant pain relief with the use of his stimulator device.   Pain level is rated as 0/10 with the use of the spinal cord stimulator device.   Patient level ranges from 0/10 to 5/10 with the use of the spinal cord stimulator device.    Patient reports improvement of his pain, numbness, and weakness in the lower extremities, but reports he needs coverage extended from his knees down.      Medtronic SCS Device:  Date of implantation: 04/08/2024  Surgeon: Akin Rodriguez MD    Indications: Treatment of chronic intractable lower back and lower extremity pain.    Leads: two spinal cord stimulator leads Medtronic Vectris compact SureScan MRI (Full Body MRI Compatible) at the superior endplate of the T7 vertebral body  IPG: Medtronic Intellis with AdaptiveStim rechargeable   System: Full Body MRI compatible -1.5 Monique     Please see previous OV notes for a comprehensive history.     Chronic Pain History: Mr. Chris Patel, originally referred by Dr. Herminio Hernandes in consultation of chronic intractable lower back and lower extremity pain.  He reported a longstanding history of chronic intractable lower back pain. He previously underwent " epidural injections in September 2021, without sustained relief. On October 18, 2021, he underwent lumbar laminectomies with medial facetectomies and foraminotomies at L3-L4 and L4-L5 by Dr. Arevalo. By June 2022, he saw neurosurgery and was complaining of residual symptoms. He was seen by [facility]. [facility] with [facility] for treatment of persistent lower back pain.  Dr. Clement requested consideration for medial branch blocks and RFA, if beneficial Dr. Clement would consider an L3-S1 lumbar fusion.  MRI of the lumbar spine revealed postsurgical change from prior laminectomies at L3-L4 and L4-L5. L2-L3: facet hypertrophy with loss of disc height, no significant spinal or neuroforaminal stenosis.  L5-S1 Modic endplate changes. On August 21, 2023 he underwent bilateral lumbar medial branch rhizotomies, from which he reported 100% pain relief with modest functional improvement.  He was still complaining from symptoms related to lumbar postlaminectomy syndrome, painful peripheral polyneuropathy and PAD. He failed to obtain pain relief with surgical measures ans well as conservative measures including oral analgesics, opioids, topical analgesics, ice, heat, physical therapy, physical therapist directed home exercise program HEP (ongoing), to name a few.  Mr. Chris Patel underwent a successful spinal cord stimulator trial the week of 03/18/2024. Mr. Chris Patel reported 75% pain relief of his chronic lower back pain, his lower extremity pain, and his diabetic neuropathic pain along with remarkable functional improvement with the use of his stimulator device. Patient reports significant relief of his previously severe neurogenic claudication. In addition, patient reports improvement of his nocturnal pain with the use of the SCS device. Chris Patel was able to operate his stimulator device without difficulties. He did not take additional analgesics throughout his spinal cord stimulator  trial. Patient was very satisfied with the outcome of his SCS trial and decided to move forward with implantation of a spinal cord stimulator device.   Pain Description: Constant bilateral leg pain from the knee down with intermittent exacerbation, described as burning, crushing, sharpand throbbing sensation.   Radiation of Pain: The pain radiates from the knees down to his feet and it is associated with numbness and weakness in his legs  Pain intensity today: 3/10   Average pain intensity last week: 6/10  Pain intensity ranges from: 3/10 to 9/10  Aggravating factors: Pain increases with standing and walking. Patient describes neurogenic severe claudication. Patient does not use a cane or a walker  Alleviating factors: Pain decreases with sitting down, lying down  Associated Symptoms:   Patient reports pain, numbness and weakness from his knees down to his feet due to peripheral polyneuropathy   Patient denies any new bladder or bowel problems.   Patient reports difficulties with his balance and reports some recent falls.   Pain interferes with ADLs, general activities (ability to walk, stand, transition from different positions), and affects patient's quality of life  Pain does not interfere with sleep      Review of previous therapies and additional medical records:  Chris Patel has already failed the following measures, including:   Conservative Measures: Oral analgesics, opioids, topical analgesics, ice, heat, physical therapy, physical therapist directed home exercise program HEP (ongoing)  Interventional Measures:   08/21/2023: Bilateral lumbar medial branch rhizotomies  Prior to lumbar surgery:  09/2021: Therapeutic bilateral L3-L4 transforaminal epidural steroid injections    07/19/2021: DxTx bilateral L3-L4 transforaminal epidural steroid injection   Surgical Measures:  10/18/2021: lumbar laminectomies with medial facetectomies and foraminotomies at L3-L4 and L4-L5 by Dr. Kenan Arevalo.   [facility]  "psychological evaluation with Dr. Casey Piña on 2/26/2024, per note: \"From a psychological perspective, patient is considered to be an appropriate candidate for spinal cord stimulator at this time.\"  Chris Patel underwent spine neurosurgical consultation with Teresa Coreas PA-C for Dr. Herminio Clement with [facility] and was found to be a potential surgical candidate for PLIF L3-S1.  Chris Patel presents with significant comorbidities including osteoarthritis, chronic kidney disease, hyperlipidemia, hearing loss, hypertension, psoriasis, tobacco abuse  In terms of current analgesics, Chris Patel takes: Gabapentin. Patient also takes trazodone  I have reviewed Gabriel Report consistent with medication reconciliation.  SOAPP:  High Risk            Global Pain Scale 08-08 2023 01-11 2024 04-23 2024             Pain 20 12 10              Feelings 6 0 0              Clinical outcomes 12 3 3              Activities 20 10 6              GPS Total: 58 25 19                 The [place] Back Pain Disability Scale   DATE 08-08 2023 01-11 2024 04-23 2024              Sleep through the night 3 1 1             Turn over in bed 0 0 0              Get out of bed 5 0 0              Make your bed 0 0 0              Put on socks (pantyhose) 1 0 0              Ride in a car 0 0 0              Sit in a chair for several hours 2 1 0              Stand up for 20-30 minutes 2 1 0              Climb one flight of stairs 2 1 1              Walk a few blocks (200-300 yards)  5 1 1              Walk several miles 5 2 2              Run one block (about 50 yards) 5 2 2              Take food out of the refrigerator 0 0 0              Reach up to high shelves 0 1 0              Move a chair 1 1 0              Pull or push heavy doors 1 1 0              Bend over to clean the bathtub 3 1 3              Throw a ball 0 1 0              Carry two bags of groceries 3 1 1              Lift and carry a " heavy suitcase 5 1 1              Total score 43 16 12                 Diagnostic Studies:   MRI of the thoracic spine without contrast 2/8/2024: Mild spondylosis, no significant spinal canal neuroforaminal stenosis.  Lumbar spine x-rays flexion-extension views 2/8/2024: Diffuse degenerative disc disease with lower lumbar facet arthritis.  No significant listhesis or instability.  MRI of the cervical spine without contrast 07/13/2023: multilevel cervical spondylosis.   C2-C3: Facet hypertrophy, disc osteophyte complex. Mild canal stenosis.  C3-C4: Facet hypertrophy, disc osteophyte complex. Mild canal stenosis. Moderate bilateral foraminal stenosis  C4-C5: Facet hypertrophy, disc osteophyte complex. Mild canal stenosis. Moderate to severe bilateral foraminal stenosis  C5-C6: Disc osteophyte complex, facet hypertrophy. Mild canal stenosis.  Severe bilateral foraminal stenosis  C6-C7: Facet hypertrophy, disc osteophyte complex. Mild canal stenosis. Moderate bilateral foraminal stenosis  MRI of the lumbar spine without contrast 04/20/2023:  Multilevel spondylosis with degenerative disc disease and facet hypertrophy at all lumbar levels.   T11-T12, T12-L1, L1-L2: No significant canal or foraminal stenosis  L2-L3: Slight retrolisthesis, disc bulge, facet hypertrophy.  Mild canal stenosis and mild foraminal stenosis  L3-L4: Evidence of previous laminectomy.  Disc bulge.  Facet hypertrophy.  No significant canal stenosis.  Mild foraminal stenosis  L4-L5: Evidence of previous laminectomy.  Disc bulge.  Facet hypertrophy.  No significant canal stenosis.  Mild foraminal stenosis   L5-S1: Disc bulge, facet hypertrophy.  No significant canal stenosis.  Mild left foraminal stenosis    The following portions of the patient's history were reviewed and updated as appropriate: problem list, past medical history, past surgery history, social history, family history, medications, and allergies    Review of Systems   All other systems  reviewed and are negative.        Patient Active Problem List   Diagnosis    Lumbar stenosis with neurogenic claudication    Spondylosis of lumbar region without myelopathy or radiculopathy    Degeneration of lumbar or lumbosacral intervertebral disc    Physical deconditioning    Gait disturbance    FRANCISCO JAVIER (acute kidney injury)    Bilateral low back pain without sciatica    Body mass index (BMI) of 20 to 24    Cigarette smoker    Decreased GFR    Mixed hyperlipidemia    Hypertension, benign    Screening for depression    Lumbar postlaminectomy syndrome    Polyneuropathy    Sarcopenia    Encounter for fitting and adjustment of neuropacemaker of spinal cord    Presence of neurostimulator    Status post insertion of spinal cord stimulator       Past Medical History:   Diagnosis Date    Arthritis     Back pain     Cancer     pre skin cancerous areas - few     Full dentures     full top and partial at bottom     High cholesterol     [place] (hard of hearing)     bilat hearing aids     Hypertension     Psoriasis     Wears glasses     Wears hearing aid     bilat          Past Surgical History:   Procedure Laterality Date    ANKLE SURGERY Right     fracture    HERNIA REPAIR      umbi;lical hernia - with mesh     LUMBAR LAMINECTOMY DISCECTOMY DECOMPRESSION N/A 10/18/2021    Procedure: LUMBAR LAMINECTOMY  L3-5;  Surgeon: Kenan Arevalo MD;  Location: [facility] OR;  Service: Neurosurgery;  Laterality: N/A;    SPINAL CORD STIMULATOR IMPLANT N/A 2024    Procedure: SPINAL CORD STIMULATOR INSERTION PHASE 2;  Surgeon: Akin Rodriguez MD;  Location: [facility] OR;  Service: Pain Management;  Laterality: N/A;         Family History   Family history unknown: Yes         Social History     Socioeconomic History    Marital status:    Tobacco Use    Smoking status: Former     Current packs/day: 0.00     Types: Cigarettes     Quit date: 2019     Years since quittin.4    Smokeless tobacco: Former    Tobacco comments:     Smoked 30  "years off and on   Vaping Use    Vaping status: Never Used   Substance and Sexual Activity    Alcohol use: Yes     Alcohol/week: 21.0 standard drinks of alcohol     Types: 21 Shots of liquor per week     Comment: bourbon    Drug use: Never    Sexual activity: Defer           Current Outpatient Medications:     atorvastatin (LIPITOR) 40 MG tablet, Take 1 tablet by mouth every night at bedtime., Disp: , Rfl:     gabapentin (NEURONTIN) 600 MG tablet, Take 1 tablet by mouth 2 (Two) Times a Day., Disp: , Rfl:     HYDROcodone-acetaminophen (NORCO) 5-325 MG per tablet, Take 1 tablet by mouth Every 8 (Eight) Hours As Needed for Moderate Pain for up to 15 doses., Disp: 15 tablet, Rfl: 0    HYDROcodone-acetaminophen (NORCO) 5-325 MG per tablet, 1/2 to 1 TAB QID PRN SEVERE POSTOP PAIN, Disp: 10 tablet, Rfl: 0    lisinopril (PRINIVIL,ZESTRIL) 40 MG tablet, Take 1 tablet by mouth Daily., Disp: , Rfl:     naloxone (NARCAN) 4 MG/0.1ML nasal spray, Call 911. Don't prime. [place] in 1 nostril for overdose. Repeat in 2-3 minutes in other nostril if no or minimal breathing/responsiveness., Disp: 2 each, Rfl: 0    Risankizumab-rzaa (SKYRIZI, 150 MG DOSE, SC), Inject 75 mg under the skin into the appropriate area as directed. Every 3 months-   Ld 8th, Disp: , Rfl:     sildenafil (VIAGRA) 100 MG tablet, Take 1 tablet by mouth Daily., Disp: , Rfl:     traZODone (DESYREL) 100 MG tablet, Take 1 tablet by mouth Daily., Disp: , Rfl:       Allergies   Allergen Reactions    Penicillins Hives         Ht 179.1 cm (70.5\")   Wt 74.7 kg (164 lb 9.6 oz)   BMI 23.28 kg/m²       Physical Exam   Constitutional: Patient appears well-developed, well-nourished, well-hydrated  HEENT: Head: Normocephalic and atraumatic  Eyes: Conjunctivae and lids are normal  Pupils: Equal, round, reactive to light  Musculoskeletal   Gait and station: Gait evaluation demonstrated an improved gait, shuffling, less stooping    Neurological:   Patient is alert and oriented to " person, place, and time.   Speech: Normal.   Cortical function: Normal mental status.   Motor strength: 5/5  Motor Tone: Normal .   Involuntary movements: None.   Skin and subcutaneous tissue: Skin is warm and intact. No rash noted. No cyanosis. Psychiatric: Judgment and insight: Normal. Recent and remote memory: Intact. Mood and affect: Normal.     Analysis of the spinal cord stimulator device with complex spinal cord stimulator reprogramming   Patient used the Medtronic spinal cord stimulator device 100% of the time. Analysis of impedence reveals normal impedence for all contacts. The spinal cord stimulator device was reprogrammed under my direct supervision and programs were adjusted by increasing amplitudes, in the following fashion;    Program A1 (left lead)  Electrode polarities: 2+, 3-, 4-, 5+  Amplitude: 5.5 mA     Pulse width: 500 mcs   Rate: 40 Hz      Program A2 (right lead)  Electrode polarities: 10+, 11-, 12-, 13+   Amplitude: 5.8 mA     Pulse width: 500 mcs   Rate: 400 Hz     Program A3 (Lower back)  Electrode polarities: 0-, 2+, 8-, 10+  Amplitude: 5.4 mA     Pulse width: 500 mcs   Rate: 40 Hz     Program B1   Electrode polarities: 5-, 7+, 15+   Amplitude: 5.6 mA     Pulse width: 700 mcs   Rate: 40 Hz     Program B2   Electrode polarities: 7+, 13-, 15+   Amplitude: 5.0 mA     Pulse width: 700 mcs   Rate: 40 Hz     Program B3 (Lower back)  Electrode polarities: 0-, 1+, 8-, 9+   Amplitude: 3.0 mA     Pulse width: 500 mcs   Rate: 40 Hz     Patient experienced significant pain relief with coverage with pleasant paresthesia in all areas of chronic pain.  Time spent reprogrammin minutes  A copy of the telemetry report will be scanned in the patient's chart.    ASSESSMENT:   1. Lumbar postlaminectomy syndrome    2. Lumbar stenosis with neurogenic claudication    3. Polyneuropathy    4. Spondylosis of lumbar region without myelopathy or radiculopathy    5. Physical deconditioning    6. Encounter for  fitting and adjustment of neuropacemaker of spinal cord    7. Presence of neurostimulator            PLAN/MEDICAL DECISION MAKING: Patient's continues to struggle with elements of his chronic pain.  He is requesting to experience more coverage in his lower back and legs.  He is not feeling significant benefit with use of his SCS device.  I have encouraged him to continue to let us reprogram and adjust as necessary.  I would also recommend he consider engaging in physical therapy.     1. Follow up in 3 months for continued evaluation of patient's chronic pain and possible SCS reprogramming.   2. Pharmacological measures: Reviewed and discussed;   A. Patient takes gabapentin.   3. Long-term rehabilitation efforts:  A. The patient has a history of falls. I did complete a risk assessment for falls. Fall precautions: Patient has been instructed regarding universal fall precautions, such as;   B. Patient will start a comprehensive physical therapy program for Alter-G, core strengthening, gait and balance training, neurodynamics, E-STIM, myofascial release, cupping, dry needling, home exercise program  C. Start an exercise program such as yoga and water therapy  D. Contrast therapy: Apply ice-packs for 15-20 minutes, followed by heating pads for 15-20 minutes to affected area  4. The patient has been instructed to contact my office with any questions or difficulties. The patient understands the plan and agrees to proceed accordingly.    Pain Medications               gabapentin (NEURONTIN) 600 MG tablet Take 1 tablet by mouth 2 (Two) Times a Day.    HYDROcodone-acetaminophen (NORCO) 5-325 MG per tablet Take 1 tablet by mouth Every 8 (Eight) Hours As Needed for Moderate Pain for up to 15 doses.    HYDROcodone-acetaminophen (NORCO) 5-325 MG per tablet 1/2 to 1 TAB QID PRN SEVERE POSTOP PAIN    traZODone (DESYREL) 100 MG tablet Take 1 tablet by mouth Daily.             No orders of the defined types were placed in this  encounter.       Please note that portions of this note were completed with a voice recognition program.   Any copied data in any portion of my note has been reviewed by myself and accurate.     The 21st Century Cures Act makes medical notes like this available to patients in the interest of transparency. This is a medical document intended as peer to peer communication. It is written in medical language and may contain abbreviations or verbiage that are unfamiliar. It may appear blunt or direct. Medical documents are intended to carry relevant information, facts as evident, and the clinical opinion of the practitioner.     MARSHALL Donovan    Patient Care Team:  Shannan Rivera MD as PCP - General (Internal Medicine & Pediatrics)  Shannan Rivera MD as Referring Physician (Internal Medicine & Pediatrics)  Akin Rodriguez MD as Consulting Physician (Pain Medicine)     No orders of the defined types were placed in this encounter.        Future Appointments   Date Time Provider Department Center   9/17/2024  7:30 AM Bethany Mann APRN [facility]

## 2024-09-17 ENCOUNTER — OFFICE VISIT (OUTPATIENT)
Dept: PAIN MEDICINE | Facility: CLINIC | Age: 79
End: 2024-09-17
Payer: MEDICARE

## 2024-09-17 VITALS — BODY MASS INDEX: 23.1 KG/M2 | HEIGHT: 71 IN | WEIGHT: 165 LBS

## 2024-09-17 DIAGNOSIS — M96.1 LUMBAR POSTLAMINECTOMY SYNDROME: Primary | ICD-10-CM

## 2024-09-17 DIAGNOSIS — G62.9 POLYNEUROPATHY: ICD-10-CM

## 2024-09-17 DIAGNOSIS — M48.062 LUMBAR STENOSIS WITH NEUROGENIC CLAUDICATION: ICD-10-CM

## 2024-09-17 DIAGNOSIS — Z46.2 ENCOUNTER FOR FITTING AND ADJUSTMENT OF NEUROPACEMAKER OF SPINAL CORD: ICD-10-CM

## 2024-09-17 DIAGNOSIS — Z96.82 PRESENCE OF NEUROSTIMULATOR: ICD-10-CM

## 2024-09-17 DIAGNOSIS — R53.81 PHYSICAL DECONDITIONING: ICD-10-CM

## 2024-09-17 PROCEDURE — 99214 OFFICE O/P EST MOD 30 MIN: CPT | Performed by: NURSE PRACTITIONER

## 2024-09-17 PROCEDURE — 1125F AMNT PAIN NOTED PAIN PRSNT: CPT | Performed by: NURSE PRACTITIONER

## 2024-09-17 PROCEDURE — 1160F RVW MEDS BY RX/DR IN RCRD: CPT | Performed by: NURSE PRACTITIONER

## 2024-09-17 PROCEDURE — 95972 ALYS CPLX SP/PN NPGT W/PRGRM: CPT | Performed by: NURSE PRACTITIONER

## 2024-09-17 PROCEDURE — 1159F MED LIST DOCD IN RCRD: CPT | Performed by: NURSE PRACTITIONER

## (undated) DEVICE — CHRGR BATRY STIM INTELLIS NEUROSTM SYS

## (undated) DEVICE — SUT VIC 0 CTD BR 18IN UNDYE VCP724D

## (undated) DEVICE — 3M™ IOBAN™ 2 ANTIMICROBIAL INCISE DRAPE 6650EZ: Brand: IOBAN™ 2

## (undated) DEVICE — APPL CHLORAPREP TINTED 26ML TEAL

## (undated) DEVICE — GLV SURG PREMIERPRO MIC LTX PF SZ6.5 BRN

## (undated) DEVICE — SNAP KOVER: Brand: UNBRANDED

## (undated) DEVICE — ACCY PA700 LUBRICANT DIFFUSER MR7 4 PACK: Brand: MIDAS REX

## (undated) DEVICE — ANTIBACTERIAL UNDYED BRAIDED (POLYGLACTIN 910), SYNTHETIC ABSORBABLE SUTURE: Brand: COATED VICRYL

## (undated) DEVICE — TRAP FLD MINIVAC MEGADYNE 100ML

## (undated) DEVICE — HDRST INTUB GENTLETOUCH SLOT 7IN RT

## (undated) DEVICE — CONTRL PATNT NEUROSTM INTELLIS

## (undated) DEVICE — C-ARM DRAPE: Brand: DEROYAL

## (undated) DEVICE — APPL DURAPREP IODOPHOR APL 26ML

## (undated) DEVICE — GLV SURG PREMIERPRO MIC LTX PF SZ7.5 BRN

## (undated) DEVICE — JP PERF DRN SIL FLT 7MM FULL: Brand: CARDINAL HEALTH

## (undated) DEVICE — DBD-DRAPE,LAP,CHOLE,W/TROUGHS,STERILE: Brand: MEDLINE

## (undated) DEVICE — 450 ML BOTTLE OF 0.05% CHLORHEXIDINE GLUCONATE IN 99.95% STERILE WATER FOR IRRIGATION, USP AND APPLICATOR.: Brand: IRRISEPT ANTIMICROBIAL WOUND LAVAGE

## (undated) DEVICE — SUT VIC PLS CTD ANTIB BR 3/0 8/18IN 45CM

## (undated) DEVICE — SUT ETHLN 3/0 FS1 30IN 669H

## (undated) DEVICE — JACKSON-PRATT 100CC BULB RESERVOIR: Brand: CARDINAL HEALTH

## (undated) DEVICE — TAPE,CLOTH/SILK,CURAD,3"X10YD,LF,40/CS: Brand: CURAD

## (undated) DEVICE — SYR EPILOR LOR LL 7ML LF

## (undated) DEVICE — SYR CONTRL PRESS/LO FIX/M/LL W/THMB/RNG 10ML

## (undated) DEVICE — GLV SURG SENSICARE PI MIC PF SZ8 LF STRL

## (undated) DEVICE — ELECTRD BLD EZ CLN STD 2.5IN

## (undated) DEVICE — PATIENT RETURN ELECTRODE, SINGLE-USE, CONTACT QUALITY MONITORING, ADULT, WITH 9FT CORD, FOR PATIENTS WEIGING OVER 33LBS. (15KG): Brand: MEGADYNE

## (undated) DEVICE — NEEDLE, QUINCKE 22GX3.5": Brand: MEDLINE INDUSTRIES, INC.

## (undated) DEVICE — DRAPE,TOP,102X53,STERILE: Brand: MEDLINE

## (undated) DEVICE — GAUZE,SPONGE,4"X4",16PLY,XRAY,STRL,LF: Brand: MEDLINE

## (undated) DEVICE — LEX BASIC NO DRAPE: Brand: MEDLINE INDUSTRIES, INC.

## (undated) DEVICE — TOOL 14MH30 LEGEND 14CM 3MM: Brand: MIDAS REX ™

## (undated) DEVICE — SUT NUROLON 0 MO7 CR8 18IN C541D

## (undated) DEVICE — BLANKT WARM UPPR/BDY ARM/OUT 57X196CM

## (undated) DEVICE — BNDR ABD PREMIUM/UNIV 10IN 27TO48IN

## (undated) DEVICE — NEUROSTM INTELLIS EXT WIRELESS STRL 1P/U

## (undated) DEVICE — 3M™ STERI-STRIP™ REINFORCED ADHESIVE SKIN CLOSURES, R1547, 1/2 IN X 4 IN (12 MM X 100 MM), 6 STRIPS/ENVELOPE: Brand: 3M™ STERI-STRIP™

## (undated) DEVICE — PK NEURO DISC 10

## (undated) DEVICE — UNDERGLV SURG BIOGEL INDICAT PI SZ8 BLU

## (undated) DEVICE — STRAP POSTN KN/BDY FM 5X72IN DISP

## (undated) DEVICE — DRAPE,REIN 53X77,STERILE: Brand: MEDLINE

## (undated) DEVICE — ELECTRD BLD EZ CLN MOD 4IN

## (undated) DEVICE — Device

## (undated) DEVICE — DRSNG WND BORDR/ADHS NONADHR/GZ LF 4X4IN STRL

## (undated) DEVICE — PAD,NON-ADHERENT,3X8,STERILE,LF,1/PK: Brand: MEDLINE

## (undated) DEVICE — TBG PENCL TELESCP MEGADYNE SMOKE EVAC 10FT

## (undated) DEVICE — HYPODERMIC SAFETY NEEDLE: Brand: MONOJECT

## (undated) DEVICE — SYR LL TP 10ML STRL

## (undated) DEVICE — ADHS LIQ MASTISOL 2/3ML

## (undated) DEVICE — PENCL ROCKRSWCH MEGADYNE W/HOLSTR 10FT SS

## (undated) DEVICE — SUT VIC 2/0 SH CR8 27IN VCP785D